# Patient Record
Sex: MALE | Race: WHITE | Employment: OTHER | URBAN - METROPOLITAN AREA
[De-identification: names, ages, dates, MRNs, and addresses within clinical notes are randomized per-mention and may not be internally consistent; named-entity substitution may affect disease eponyms.]

---

## 2017-03-29 ENCOUNTER — TELEPHONE (OUTPATIENT)
Dept: FAMILY MEDICINE CLINIC | Age: 67
End: 2017-03-29

## 2017-03-31 ENCOUNTER — OFFICE VISIT (OUTPATIENT)
Dept: FAMILY MEDICINE CLINIC | Age: 67
End: 2017-03-31

## 2017-03-31 VITALS
HEIGHT: 70 IN | HEART RATE: 68 BPM | SYSTOLIC BLOOD PRESSURE: 124 MMHG | OXYGEN SATURATION: 98 % | DIASTOLIC BLOOD PRESSURE: 80 MMHG | BODY MASS INDEX: 31.24 KG/M2 | RESPIRATION RATE: 16 BRPM | WEIGHT: 218.2 LBS

## 2017-03-31 DIAGNOSIS — M54.2 CERVICAL MUSCLE PAIN: ICD-10-CM

## 2017-03-31 DIAGNOSIS — S16.1XXA ACUTE CERVICAL MYOFASCIAL STRAIN, INITIAL ENCOUNTER: ICD-10-CM

## 2017-03-31 PROCEDURE — G8598 ASA/ANTIPLAT THER USED: HCPCS | Performed by: FAMILY MEDICINE

## 2017-03-31 PROCEDURE — 1123F ACP DISCUSS/DSCN MKR DOCD: CPT | Performed by: FAMILY MEDICINE

## 2017-03-31 PROCEDURE — G8484 FLU IMMUNIZE NO ADMIN: HCPCS | Performed by: FAMILY MEDICINE

## 2017-03-31 PROCEDURE — G8427 DOCREV CUR MEDS BY ELIG CLIN: HCPCS | Performed by: FAMILY MEDICINE

## 2017-03-31 PROCEDURE — G8417 CALC BMI ABV UP PARAM F/U: HCPCS | Performed by: FAMILY MEDICINE

## 2017-03-31 PROCEDURE — 3017F COLORECTAL CA SCREEN DOC REV: CPT | Performed by: FAMILY MEDICINE

## 2017-03-31 PROCEDURE — 99213 OFFICE O/P EST LOW 20 MIN: CPT | Performed by: FAMILY MEDICINE

## 2017-03-31 PROCEDURE — 4040F PNEUMOC VAC/ADMIN/RCVD: CPT | Performed by: FAMILY MEDICINE

## 2017-03-31 PROCEDURE — 1036F TOBACCO NON-USER: CPT | Performed by: FAMILY MEDICINE

## 2017-03-31 RX ORDER — METHYLPREDNISOLONE 4 MG/1
TABLET ORAL
Qty: 1 KIT | Refills: 0 | Status: SHIPPED | OUTPATIENT
Start: 2017-03-31 | End: 2017-04-06

## 2017-03-31 ASSESSMENT — PATIENT HEALTH QUESTIONNAIRE - PHQ9
1. LITTLE INTEREST OR PLEASURE IN DOING THINGS: 0
SUM OF ALL RESPONSES TO PHQ QUESTIONS 1-9: 0
SUM OF ALL RESPONSES TO PHQ9 QUESTIONS 1 & 2: 0
2. FEELING DOWN, DEPRESSED OR HOPELESS: 0

## 2017-03-31 ASSESSMENT — ENCOUNTER SYMPTOMS
EYES NEGATIVE: 1
RESPIRATORY NEGATIVE: 1

## 2017-04-10 ENCOUNTER — OFFICE VISIT (OUTPATIENT)
Dept: FAMILY MEDICINE CLINIC | Age: 67
End: 2017-04-10

## 2017-04-10 VITALS
SYSTOLIC BLOOD PRESSURE: 118 MMHG | DIASTOLIC BLOOD PRESSURE: 66 MMHG | RESPIRATION RATE: 14 BRPM | HEIGHT: 70 IN | BODY MASS INDEX: 31.07 KG/M2 | WEIGHT: 217 LBS | OXYGEN SATURATION: 98 % | HEART RATE: 72 BPM

## 2017-04-10 DIAGNOSIS — M54.2 CERVICAL MUSCLE PAIN: ICD-10-CM

## 2017-04-10 DIAGNOSIS — S16.1XXD ACUTE CERVICAL MYOFASCIAL STRAIN, SUBSEQUENT ENCOUNTER: ICD-10-CM

## 2017-04-10 DIAGNOSIS — M50.30 DDD (DEGENERATIVE DISC DISEASE), CERVICAL: Primary | ICD-10-CM

## 2017-04-10 PROCEDURE — 1036F TOBACCO NON-USER: CPT | Performed by: FAMILY MEDICINE

## 2017-04-10 PROCEDURE — G8417 CALC BMI ABV UP PARAM F/U: HCPCS | Performed by: FAMILY MEDICINE

## 2017-04-10 PROCEDURE — 1123F ACP DISCUSS/DSCN MKR DOCD: CPT | Performed by: FAMILY MEDICINE

## 2017-04-10 PROCEDURE — 3017F COLORECTAL CA SCREEN DOC REV: CPT | Performed by: FAMILY MEDICINE

## 2017-04-10 PROCEDURE — G8427 DOCREV CUR MEDS BY ELIG CLIN: HCPCS | Performed by: FAMILY MEDICINE

## 2017-04-10 PROCEDURE — G8598 ASA/ANTIPLAT THER USED: HCPCS | Performed by: FAMILY MEDICINE

## 2017-04-10 PROCEDURE — 99213 OFFICE O/P EST LOW 20 MIN: CPT | Performed by: FAMILY MEDICINE

## 2017-04-10 PROCEDURE — 4040F PNEUMOC VAC/ADMIN/RCVD: CPT | Performed by: FAMILY MEDICINE

## 2017-04-10 ASSESSMENT — PATIENT HEALTH QUESTIONNAIRE - PHQ9
SUM OF ALL RESPONSES TO PHQ QUESTIONS 1-9: 0
1. LITTLE INTEREST OR PLEASURE IN DOING THINGS: 0
SUM OF ALL RESPONSES TO PHQ9 QUESTIONS 1 & 2: 0
2. FEELING DOWN, DEPRESSED OR HOPELESS: 0

## 2017-04-17 ENCOUNTER — OFFICE VISIT (OUTPATIENT)
Dept: CARDIOLOGY | Age: 67
End: 2017-04-17

## 2017-04-17 VITALS
DIASTOLIC BLOOD PRESSURE: 80 MMHG | HEIGHT: 69 IN | BODY MASS INDEX: 32.29 KG/M2 | SYSTOLIC BLOOD PRESSURE: 148 MMHG | HEART RATE: 64 BPM | WEIGHT: 218 LBS

## 2017-04-17 DIAGNOSIS — E78.01 FAMILIAL HYPERCHOLESTEROLEMIA: ICD-10-CM

## 2017-04-17 DIAGNOSIS — I10 ESSENTIAL HYPERTENSION: ICD-10-CM

## 2017-04-17 DIAGNOSIS — I25.10 CORONARY ARTERY DISEASE INVOLVING NATIVE CORONARY ARTERY OF NATIVE HEART WITHOUT ANGINA PECTORIS: Primary | ICD-10-CM

## 2017-04-17 PROCEDURE — 1036F TOBACCO NON-USER: CPT | Performed by: NUCLEAR MEDICINE

## 2017-04-17 PROCEDURE — G8427 DOCREV CUR MEDS BY ELIG CLIN: HCPCS | Performed by: NUCLEAR MEDICINE

## 2017-04-17 PROCEDURE — 1123F ACP DISCUSS/DSCN MKR DOCD: CPT | Performed by: NUCLEAR MEDICINE

## 2017-04-17 PROCEDURE — 4040F PNEUMOC VAC/ADMIN/RCVD: CPT | Performed by: NUCLEAR MEDICINE

## 2017-04-17 PROCEDURE — G8417 CALC BMI ABV UP PARAM F/U: HCPCS | Performed by: NUCLEAR MEDICINE

## 2017-04-17 PROCEDURE — 93000 ELECTROCARDIOGRAM COMPLETE: CPT | Performed by: NUCLEAR MEDICINE

## 2017-04-17 PROCEDURE — 99213 OFFICE O/P EST LOW 20 MIN: CPT | Performed by: NUCLEAR MEDICINE

## 2017-04-17 PROCEDURE — G8598 ASA/ANTIPLAT THER USED: HCPCS | Performed by: NUCLEAR MEDICINE

## 2017-04-17 PROCEDURE — 3017F COLORECTAL CA SCREEN DOC REV: CPT | Performed by: NUCLEAR MEDICINE

## 2017-04-17 RX ORDER — VALSARTAN AND HYDROCHLOROTHIAZIDE 160; 12.5 MG/1; MG/1
TABLET, FILM COATED ORAL
Qty: 90 TABLET | Refills: 3 | Status: SHIPPED | OUTPATIENT
Start: 2017-04-17 | End: 2018-06-01 | Stop reason: SDUPTHER

## 2017-04-17 RX ORDER — CLOPIDOGREL BISULFATE 75 MG/1
TABLET ORAL
Qty: 90 TABLET | Refills: 3 | Status: SHIPPED | OUTPATIENT
Start: 2017-04-17 | End: 2018-06-01 | Stop reason: SDUPTHER

## 2017-04-17 RX ORDER — ATENOLOL 25 MG/1
TABLET ORAL
Qty: 90 TABLET | Refills: 3 | Status: ON HOLD | OUTPATIENT
Start: 2017-04-17 | End: 2017-12-15 | Stop reason: HOSPADM

## 2017-04-18 ENCOUNTER — PROCEDURE VISIT (OUTPATIENT)
Dept: FAMILY MEDICINE CLINIC | Age: 67
End: 2017-04-18

## 2017-04-18 VITALS
HEIGHT: 69 IN | SYSTOLIC BLOOD PRESSURE: 128 MMHG | OXYGEN SATURATION: 99 % | DIASTOLIC BLOOD PRESSURE: 60 MMHG | WEIGHT: 217.9 LBS | HEART RATE: 65 BPM | BODY MASS INDEX: 32.27 KG/M2 | RESPIRATION RATE: 14 BRPM

## 2017-04-18 DIAGNOSIS — L91.8 SKIN TAG: Primary | ICD-10-CM

## 2017-04-18 PROCEDURE — 1123F ACP DISCUSS/DSCN MKR DOCD: CPT | Performed by: FAMILY MEDICINE

## 2017-04-18 PROCEDURE — 3017F COLORECTAL CA SCREEN DOC REV: CPT | Performed by: FAMILY MEDICINE

## 2017-04-18 PROCEDURE — G8417 CALC BMI ABV UP PARAM F/U: HCPCS | Performed by: FAMILY MEDICINE

## 2017-04-18 PROCEDURE — G8598 ASA/ANTIPLAT THER USED: HCPCS | Performed by: FAMILY MEDICINE

## 2017-04-18 PROCEDURE — G8427 DOCREV CUR MEDS BY ELIG CLIN: HCPCS | Performed by: FAMILY MEDICINE

## 2017-04-18 PROCEDURE — 11200 RMVL SKIN TAGS UP TO&INC 15: CPT | Performed by: FAMILY MEDICINE

## 2017-04-18 PROCEDURE — 1036F TOBACCO NON-USER: CPT | Performed by: FAMILY MEDICINE

## 2017-04-18 PROCEDURE — 4040F PNEUMOC VAC/ADMIN/RCVD: CPT | Performed by: FAMILY MEDICINE

## 2017-06-16 ENCOUNTER — TELEPHONE (OUTPATIENT)
Dept: FAMILY MEDICINE CLINIC | Age: 67
End: 2017-06-16

## 2017-06-19 ENCOUNTER — CARE COORDINATION (OUTPATIENT)
Dept: FAMILY MEDICINE CLINIC | Age: 67
End: 2017-06-19

## 2017-11-08 RX ORDER — TAMSULOSIN HYDROCHLORIDE 0.4 MG/1
0.4 CAPSULE ORAL DAILY
Qty: 30 CAPSULE | Refills: 11 | Status: SHIPPED | OUTPATIENT
Start: 2017-11-08 | End: 2019-02-04

## 2017-11-17 ENCOUNTER — NURSE TRIAGE (OUTPATIENT)
Dept: ADMINISTRATIVE | Age: 67
End: 2017-11-17

## 2017-11-17 NOTE — TELEPHONE ENCOUNTER
Reason for Disposition   Caused by overuse from recent vigorous activity (e.g.,  aerobics, jogging/running, physical work, prolonged walking, sports)  (all triage questions negative)    Answer Assessment - Initial Assessment Questions  1. ONSET: \"When did the pain start? \"       This morning   2. LOCATION: \"Where is the pain located? \"       Left leg   3. PAIN: \"How bad is the pain? \"    (Scale 1-10; or mild, moderate, severe)    -  MILD (1-3): doesn't interfere with normal activities     -  MODERATE (4-7): interferes with normal activities (e.g., work or school) or awakens from sleep, limping     -  SEVERE (8-10): excruciating pain, unable to do any normal activities, unable to walk      3  4. WORK OR EXERCISE: \"Has there been any recent work or exercise that involved this part of the body? \"       On feet more this week   5. CAUSE: \"What do you think is causing the leg pain? \"      *No Answer*  6. OTHER SYMPTOMS: \"Do you have any other symptoms? \" (e.g., chest pain, back pain, breathing difficulty, swelling, rash, fever, numbness, weakness)  No rashes  No redness  No swelling *  7. PREGNANCY: \"Is there any chance you are pregnant? \" \"When was your last menstrual period? \"     n/a    Protocols used: LEG PAIN-ADULT-

## 2017-12-01 RX ORDER — NITROGLYCERIN 0.4 MG/1
TABLET SUBLINGUAL
Qty: 25 TABLET | Refills: 3 | Status: SHIPPED | OUTPATIENT
Start: 2017-12-01 | End: 2019-09-11 | Stop reason: SDUPTHER

## 2017-12-01 RX ORDER — ISOSORBIDE MONONITRATE 30 MG/1
30 TABLET, EXTENDED RELEASE ORAL DAILY PRN
Qty: 30 TABLET | Refills: 5 | Status: SHIPPED | OUTPATIENT
Start: 2017-12-01 | End: 2018-02-08 | Stop reason: ALTCHOICE

## 2017-12-01 NOTE — TELEPHONE ENCOUNTER
Lori Klein called requesting a refill on the following medications:  Requested Prescriptions     Pending Prescriptions Disp Refills    isosorbide mononitrate (IMDUR) 30 MG extended release tablet 30 tablet 11     Sig: Take 1 tablet by mouth daily as needed    nitroGLYCERIN (NITROSTAT) 0.4 MG SL tablet 25 tablet 3     Sig: up to max of 3 total doses. If no relief after 1 dose, call 911.      Pharmacy verified:  .pv    [facility]    Date of last visit: 4/17/17  Date of next visit (if applicable): 2/89/3206

## 2017-12-04 ENCOUNTER — TELEPHONE (OUTPATIENT)
Dept: FAMILY MEDICINE CLINIC | Age: 67
End: 2017-12-04

## 2017-12-04 NOTE — TELEPHONE ENCOUNTER
Pt called office stating Dr. Krish Blue is out of town this week. Pt has been having \"quite a bit\" of chest pain the past 3-4days. Pt is asking to come in for an EKG. I encouraged pt to go to the ER for eval, as we are limited on testing here in the office for chest pain. Pt says \"I'm not really up for that, I've been down that road before\". I spoke personally to Dr. Sasha Alvarez who says no, pt absolutely needs to go to the ER for eval. I again encouraged pt to go to the ER per Dr. Ericka Braxton advice and pt hung up the phone before I could confirm pt is actually going to go to the ER. I called pt back and he is just not sure if that is what he wants to do, he says the pain is not that bad. I repeated back to pt that he informed me it was \"quite a bit\" of chest pain. He says it was a few days ago, just not as bad now.  Pt is going to think things over and he may go to the ER for eval.

## 2017-12-11 ENCOUNTER — OFFICE VISIT (OUTPATIENT)
Dept: CARDIOLOGY CLINIC | Age: 67
End: 2017-12-11
Payer: MEDICARE

## 2017-12-11 VITALS
WEIGHT: 206 LBS | BODY MASS INDEX: 30.51 KG/M2 | DIASTOLIC BLOOD PRESSURE: 90 MMHG | HEART RATE: 59 BPM | SYSTOLIC BLOOD PRESSURE: 162 MMHG | HEIGHT: 69 IN

## 2017-12-11 DIAGNOSIS — R07.2 PRECORDIAL PAIN: ICD-10-CM

## 2017-12-11 DIAGNOSIS — I25.10 CORONARY ARTERY DISEASE INVOLVING NATIVE CORONARY ARTERY OF NATIVE HEART WITHOUT ANGINA PECTORIS: Primary | ICD-10-CM

## 2017-12-11 DIAGNOSIS — I10 ESSENTIAL HYPERTENSION: ICD-10-CM

## 2017-12-11 DIAGNOSIS — E78.01 FAMILIAL HYPERCHOLESTEROLEMIA: ICD-10-CM

## 2017-12-11 PROCEDURE — 99214 OFFICE O/P EST MOD 30 MIN: CPT | Performed by: NUCLEAR MEDICINE

## 2017-12-11 PROCEDURE — 93000 ELECTROCARDIOGRAM COMPLETE: CPT | Performed by: NUCLEAR MEDICINE

## 2017-12-11 PROCEDURE — G8484 FLU IMMUNIZE NO ADMIN: HCPCS | Performed by: NUCLEAR MEDICINE

## 2017-12-11 PROCEDURE — G8417 CALC BMI ABV UP PARAM F/U: HCPCS | Performed by: NUCLEAR MEDICINE

## 2017-12-11 PROCEDURE — 4040F PNEUMOC VAC/ADMIN/RCVD: CPT | Performed by: NUCLEAR MEDICINE

## 2017-12-11 PROCEDURE — G8427 DOCREV CUR MEDS BY ELIG CLIN: HCPCS | Performed by: NUCLEAR MEDICINE

## 2017-12-11 PROCEDURE — 3017F COLORECTAL CA SCREEN DOC REV: CPT | Performed by: NUCLEAR MEDICINE

## 2017-12-11 PROCEDURE — G8598 ASA/ANTIPLAT THER USED: HCPCS | Performed by: NUCLEAR MEDICINE

## 2017-12-11 PROCEDURE — 1036F TOBACCO NON-USER: CPT | Performed by: NUCLEAR MEDICINE

## 2017-12-11 PROCEDURE — 1123F ACP DISCUSS/DSCN MKR DOCD: CPT | Performed by: NUCLEAR MEDICINE

## 2017-12-11 NOTE — PROGRESS NOTES
Quit date: 5/26/1992    Smokeless tobacco: Not on file    Alcohol use Yes      Comment: glass red wine daily      Current Outpatient Prescriptions   Medication Sig Dispense Refill    isosorbide mononitrate (IMDUR) 30 MG extended release tablet Take 1 tablet by mouth daily as needed (PRN) 30 tablet 5    nitroGLYCERIN (NITROSTAT) 0.4 MG SL tablet DISSOLVE ONE TABLET UNDER THE TONGUE EVERY 5 MINUTES AS NEEDED FOR CHEST PAIN. DO NOT EXCEED A TOTAL OF 3 DOSES IN 15 MINUTES 25 tablet 3    tamsulosin (FLOMAX) 0.4 MG capsule Take 1 capsule by mouth daily (Patient taking differently: Take 0.4 mg by mouth as needed ) 30 capsule 11    clopidogrel (PLAVIX) 75 MG tablet TAKE ONE TABLET BY MOUTH ONCE DAILY 90 tablet 3    valsartan-hydrochlorothiazide (DIOVAN-HCT) 160-12.5 MG per tablet TAKE ONE TABLET BY MOUTH ONCE DAILY 90 tablet 3    atenolol (TENORMIN) 25 MG tablet TAKE ONE TABLET BY MOUTH ONCE DAILY 90 tablet 3    Cholecalciferol (VITAMIN D PO) Take by mouth      aspirin 81 MG tablet Take 81 mg by mouth daily.  omeprazole (PRILOSEC) 20 MG capsule Take 20 mg by mouth daily.  Ascorbic Acid (VITAMIN C) 1000 MG tablet Take 1,000 mg by mouth daily.  Coenzyme Q10 100 MG CAPS Take 100 mg by mouth daily. No current facility-administered medications for this visit.       Allergies   Allergen Reactions    Crestor [Rosuvastatin]      Muscle aches    Tape [Adhesive Tape] Rash     Blisters and red     Health Maintenance   Topic Date Due    AAA screen  1950    Hepatitis C screen  1950    DTaP/Tdap/Td vaccine (1 - Tdap) 03/28/1970    Diabetes screen  03/28/1991    Colon cancer screen colonoscopy  03/28/2001    Zostavax vaccine  03/28/2011    Pneumococcal low/med risk (1 of 2 - PCV13) 03/28/2016    Flu vaccine (1) 09/01/2017    Lipid screen  10/11/2018       Subjective:  Review of Systems  General:   No fever, no chills, some fatigue or weight loss  Pulmonary:    some dyspnea, no wheezing  Cardiac:    Did have chest pain,   GI:     No nausea or vomiting, no abdominal pain  Neuro:     No dizziness or light headedness,   Musculoskeletal:  No recent active issues  Extremities:   No edema, good peripheral pulses      Objective:  Physical Exam  BP (!) 162/90   Pulse 59   Ht 5' 9\" (1.753 m)   Wt 206 lb (93.4 kg)   BMI 30.42 kg/m²   General:   Well developed, well nourished  Lungs:    Clear to auscultation  Heart:    Normal S1 S2, Slight murmur. no rubs, no gallops  Abdomen:   Soft, non tender, no organomegalies, positive bowel sounds  Extremities:   No edema, no cyanosis, good peripheral pulses  Neurological:   Awake, alert, oriented. No obvious focal deficits  Musculoskelatal:  No obvious deformities    Assessment:     1. Coronary artery disease involving native coronary artery of native heart without angina pectoris  EKG 12 Lead   2. Precordial pain  EKG 12 Lead   3. Familial hypercholesterolemia     4. Essential hypertension     concerning symptoms  Concerning higher risk patient   ECG in office was done today. I reviewed the ECG. No acute findings    Plan:  No Follow-up on file. We discussed a cath vs a stress test   Higher risk patient   concerning symptoms  Use Imdur daily   Cardiac cathterizaion, risks and benefits discussed with the patient and family at length. Patient was agreeable. Continue risk factor modification and medical management  Thank you for allowing me to participate in the care of your patient. Please don't hesitate to contact me regarding any further issues related to the patient care    Orders Placed:  Orders Placed This Encounter   Procedures    EKG 12 Lead     Order Specific Question:   Reason for Exam?     Answer:   Chest pain       Medications Prescribed:  No orders of the defined types were placed in this encounter. Discussed use, benefit, and side effects of prescribed medications. All patient questions answered. Pt voiced understanding.  Instructed

## 2017-12-11 NOTE — PROGRESS NOTES
Patient here for check up for chest pain. Patient complains of chest pain for the last couple weeks, has taken NTG with effect. Patient complains of worsening symptoms in the cold. Patient complains of increasing fatigue and sob.

## 2017-12-14 ENCOUNTER — HOSPITAL ENCOUNTER (OUTPATIENT)
Age: 67
Discharge: HOME | End: 2017-12-14
Attending: NUCLEAR MEDICINE | Admitting: NUCLEAR MEDICINE
Payer: MEDICARE

## 2017-12-14 ENCOUNTER — HOSPITAL ENCOUNTER (OUTPATIENT)
Dept: CARDIAC CATH/INVASIVE PROCEDURES | Age: 67
Discharge: HOME | End: 2017-12-15
Attending: NUCLEAR MEDICINE | Admitting: NUCLEAR MEDICINE
Payer: MEDICARE

## 2017-12-14 PROBLEM — R07.9 CHEST PAIN: Status: ACTIVE | Noted: 2017-12-14

## 2017-12-14 LAB
ABO: NORMAL
ANION GAP SERPL CALCULATED.3IONS-SCNC: 14 MEQ/L (ref 8–16)
ANTIBODY SCREEN: NORMAL
BASOPHILS # BLD: 1 %
BASOPHILS ABSOLUTE: 0.1 THOU/MM3 (ref 0–0.1)
BUN BLDV-MCNC: 22 MG/DL (ref 7–22)
CALCIUM SERPL-MCNC: 9.6 MG/DL (ref 8.5–10.5)
CHLORIDE BLD-SCNC: 99 MEQ/L (ref 98–111)
CHOLESTEROL, TOTAL: 293 MG/DL (ref 100–199)
CO2: 27 MEQ/L (ref 23–33)
CREAT SERPL-MCNC: 1 MG/DL (ref 0.4–1.2)
EOSINOPHIL # BLD: 2.4 %
EOSINOPHILS ABSOLUTE: 0.2 THOU/MM3 (ref 0–0.4)
GFR SERPL CREATININE-BSD FRML MDRD: 75 ML/MIN/1.73M2
GLUCOSE BLD-MCNC: 102 MG/DL (ref 70–108)
HCT VFR BLD CALC: 47.6 % (ref 42–52)
HDLC SERPL-MCNC: 32 MG/DL
HEMOGLOBIN: 16.7 GM/DL (ref 14–18)
INR BLD: 1 (ref 0.85–1.13)
LDL CHOLESTEROL CALCULATED: ABNORMAL MG/DL
LV EF: 55 %
LVEF MODALITY: NORMAL
LYMPHOCYTES # BLD: 30.6 %
LYMPHOCYTES ABSOLUTE: 2.6 THOU/MM3 (ref 1–4.8)
MCH RBC QN AUTO: 30.8 PG (ref 27–31)
MCHC RBC AUTO-ENTMCNC: 35 GM/DL (ref 33–37)
MCV RBC AUTO: 88.1 FL (ref 80–94)
MONOCYTES # BLD: 7.1 %
MONOCYTES ABSOLUTE: 0.6 THOU/MM3 (ref 0.4–1.3)
NUCLEATED RED BLOOD CELLS: 0 /100 WBC
PDW BLD-RTO: 13.3 % (ref 11.5–14.5)
PLATELET # BLD: 151 THOU/MM3 (ref 130–400)
PMV BLD AUTO: 9.8 MCM (ref 7.4–10.4)
POTASSIUM SERPL-SCNC: 4.4 MEQ/L (ref 3.5–5.2)
RBC # BLD: 5.41 MILL/MM3 (ref 4.7–6.1)
RH FACTOR: NORMAL
SEG NEUTROPHILS: 58.9 %
SEGMENTED NEUTROPHILS ABSOLUTE COUNT: 4.9 THOU/MM3 (ref 1.8–7.7)
SODIUM BLD-SCNC: 140 MEQ/L (ref 135–145)
TRIGL SERPL-MCNC: 491 MG/DL (ref 0–199)
WBC # BLD: 8.4 THOU/MM3 (ref 4.8–10.8)

## 2017-12-14 PROCEDURE — 2580000003 HC RX 258: Performed by: NUCLEAR MEDICINE

## 2017-12-14 PROCEDURE — C1725 CATH, TRANSLUMIN NON-LASER: HCPCS

## 2017-12-14 PROCEDURE — 86900 BLOOD TYPING SEROLOGIC ABO: CPT

## 2017-12-14 PROCEDURE — 36415 COLL VENOUS BLD VENIPUNCTURE: CPT

## 2017-12-14 PROCEDURE — 80061 LIPID PANEL: CPT

## 2017-12-14 PROCEDURE — 6360000002 HC RX W HCPCS

## 2017-12-14 PROCEDURE — C1894 INTRO/SHEATH, NON-LASER: HCPCS

## 2017-12-14 PROCEDURE — 2720000010 HC SURG SUPPLY STERILE

## 2017-12-14 PROCEDURE — 85610 PROTHROMBIN TIME: CPT

## 2017-12-14 PROCEDURE — 85025 COMPLETE CBC W/AUTO DIFF WBC: CPT

## 2017-12-14 PROCEDURE — C1769 GUIDE WIRE: HCPCS

## 2017-12-14 PROCEDURE — 86850 RBC ANTIBODY SCREEN: CPT

## 2017-12-14 PROCEDURE — 93306 TTE W/DOPPLER COMPLETE: CPT

## 2017-12-14 PROCEDURE — 92920 PRQ TRLUML C ANGIOP 1ART&/BR: CPT | Performed by: NUCLEAR MEDICINE

## 2017-12-14 PROCEDURE — 2580000003 HC RX 258

## 2017-12-14 PROCEDURE — 80048 BASIC METABOLIC PNL TOTAL CA: CPT

## 2017-12-14 PROCEDURE — 2780000010 HC IMPLANT OTHER

## 2017-12-14 PROCEDURE — 93458 L HRT ARTERY/VENTRICLE ANGIO: CPT | Performed by: NUCLEAR MEDICINE

## 2017-12-14 PROCEDURE — C1760 CLOSURE DEV, VASC: HCPCS

## 2017-12-14 PROCEDURE — 93005 ELECTROCARDIOGRAM TRACING: CPT

## 2017-12-14 PROCEDURE — 6370000000 HC RX 637 (ALT 250 FOR IP): Performed by: INTERNAL MEDICINE

## 2017-12-14 PROCEDURE — 2500000003 HC RX 250 WO HCPCS

## 2017-12-14 PROCEDURE — A6258 TRANSPARENT FILM >16<=48 IN: HCPCS

## 2017-12-14 PROCEDURE — 86901 BLOOD TYPING SEROLOGIC RH(D): CPT

## 2017-12-14 RX ORDER — ACETAMINOPHEN 325 MG/1
650 TABLET ORAL EVERY 4 HOURS PRN
Status: DISCONTINUED | OUTPATIENT
Start: 2017-12-14 | End: 2017-12-15 | Stop reason: HOSPADM

## 2017-12-14 RX ORDER — ATROPINE SULFATE 0.4 MG/ML
0.5 AMPUL (ML) INJECTION
Status: ACTIVE | OUTPATIENT
Start: 2017-12-14 | End: 2017-12-14

## 2017-12-14 RX ORDER — ONDANSETRON 2 MG/ML
4 INJECTION INTRAMUSCULAR; INTRAVENOUS EVERY 6 HOURS PRN
Status: DISCONTINUED | OUTPATIENT
Start: 2017-12-14 | End: 2017-12-15 | Stop reason: HOSPADM

## 2017-12-14 RX ORDER — MORPHINE SULFATE 2 MG/ML
2 INJECTION, SOLUTION INTRAMUSCULAR; INTRAVENOUS
Status: ACTIVE | OUTPATIENT
Start: 2017-12-14 | End: 2017-12-14

## 2017-12-14 RX ORDER — ATROPINE SULFATE 0.4 MG/ML
0.5 AMPUL (ML) INJECTION
Status: DISCONTINUED | OUTPATIENT
Start: 2017-12-14 | End: 2017-12-14 | Stop reason: SDUPTHER

## 2017-12-14 RX ORDER — CARVEDILOL 3.12 MG/1
3.12 TABLET ORAL 2 TIMES DAILY WITH MEALS
Status: DISCONTINUED | OUTPATIENT
Start: 2017-12-14 | End: 2017-12-15 | Stop reason: HOSPADM

## 2017-12-14 RX ORDER — ASPIRIN 81 MG/1
81 TABLET, CHEWABLE ORAL ONCE
Status: DISCONTINUED | OUTPATIENT
Start: 2017-12-14 | End: 2017-12-14

## 2017-12-14 RX ORDER — ONDANSETRON 2 MG/ML
4 INJECTION INTRAMUSCULAR; INTRAVENOUS EVERY 6 HOURS PRN
Status: DISCONTINUED | OUTPATIENT
Start: 2017-12-14 | End: 2017-12-14 | Stop reason: SDUPTHER

## 2017-12-14 RX ORDER — SODIUM CHLORIDE 9 MG/ML
75 INJECTION, SOLUTION INTRAVENOUS CONTINUOUS
Status: DISCONTINUED | OUTPATIENT
Start: 2017-12-14 | End: 2017-12-15 | Stop reason: HOSPADM

## 2017-12-14 RX ORDER — SODIUM CHLORIDE 0.9 % (FLUSH) 0.9 %
10 SYRINGE (ML) INJECTION EVERY 12 HOURS SCHEDULED
Status: DISCONTINUED | OUTPATIENT
Start: 2017-12-14 | End: 2017-12-14 | Stop reason: SDUPTHER

## 2017-12-14 RX ORDER — ASPIRIN 81 MG/1
81 TABLET ORAL DAILY
Status: DISCONTINUED | OUTPATIENT
Start: 2017-12-15 | End: 2017-12-15 | Stop reason: HOSPADM

## 2017-12-14 RX ORDER — ACETAMINOPHEN 325 MG/1
650 TABLET ORAL EVERY 4 HOURS PRN
Status: DISCONTINUED | OUTPATIENT
Start: 2017-12-14 | End: 2017-12-14 | Stop reason: SDUPTHER

## 2017-12-14 RX ORDER — SODIUM CHLORIDE 0.9 % (FLUSH) 0.9 %
10 SYRINGE (ML) INJECTION PRN
Status: DISCONTINUED | OUTPATIENT
Start: 2017-12-14 | End: 2017-12-15 | Stop reason: HOSPADM

## 2017-12-14 RX ORDER — SODIUM CHLORIDE 9 MG/ML
INJECTION, SOLUTION INTRAVENOUS CONTINUOUS
Status: DISCONTINUED | OUTPATIENT
Start: 2017-12-14 | End: 2017-12-14

## 2017-12-14 RX ORDER — SODIUM CHLORIDE 0.9 % (FLUSH) 0.9 %
10 SYRINGE (ML) INJECTION EVERY 12 HOURS SCHEDULED
Status: DISCONTINUED | OUTPATIENT
Start: 2017-12-14 | End: 2017-12-15 | Stop reason: HOSPADM

## 2017-12-14 RX ORDER — SODIUM CHLORIDE 0.9 % (FLUSH) 0.9 %
10 SYRINGE (ML) INJECTION PRN
Status: DISCONTINUED | OUTPATIENT
Start: 2017-12-14 | End: 2017-12-14

## 2017-12-14 RX ORDER — SODIUM CHLORIDE 9 MG/ML
75 INJECTION, SOLUTION INTRAVENOUS CONTINUOUS
Status: DISCONTINUED | OUTPATIENT
Start: 2017-12-14 | End: 2017-12-14 | Stop reason: SDUPTHER

## 2017-12-14 RX ORDER — SODIUM CHLORIDE 0.9 % (FLUSH) 0.9 %
10 SYRINGE (ML) INJECTION EVERY 12 HOURS SCHEDULED
Status: DISCONTINUED | OUTPATIENT
Start: 2017-12-14 | End: 2017-12-14

## 2017-12-14 RX ORDER — SODIUM CHLORIDE 0.9 % (FLUSH) 0.9 %
10 SYRINGE (ML) INJECTION PRN
Status: DISCONTINUED | OUTPATIENT
Start: 2017-12-14 | End: 2017-12-14 | Stop reason: SDUPTHER

## 2017-12-14 RX ORDER — MORPHINE SULFATE 2 MG/ML
2 INJECTION, SOLUTION INTRAMUSCULAR; INTRAVENOUS
Status: DISCONTINUED | OUTPATIENT
Start: 2017-12-14 | End: 2017-12-14 | Stop reason: SDUPTHER

## 2017-12-14 RX ADMIN — SODIUM CHLORIDE: 9 INJECTION, SOLUTION INTRAVENOUS at 15:51

## 2017-12-14 RX ADMIN — CARVEDILOL 3.12 MG: 3.12 TABLET, FILM COATED ORAL at 22:47

## 2017-12-14 RX ADMIN — ACETAMINOPHEN 650 MG: 325 TABLET ORAL at 21:03

## 2017-12-14 ASSESSMENT — PAIN DESCRIPTION - ORIENTATION: ORIENTATION: LOWER

## 2017-12-14 ASSESSMENT — PAIN SCALES - GENERAL
PAINLEVEL_OUTOF10: 0
PAINLEVEL_OUTOF10: 2
PAINLEVEL_OUTOF10: 0
PAINLEVEL_OUTOF10: 2

## 2017-12-14 ASSESSMENT — PAIN DESCRIPTION - FREQUENCY: FREQUENCY: CONTINUOUS

## 2017-12-14 ASSESSMENT — PAIN DESCRIPTION - DESCRIPTORS: DESCRIPTORS: ACHING

## 2017-12-14 ASSESSMENT — PAIN DESCRIPTION - ONSET: ONSET: ON-GOING

## 2017-12-14 ASSESSMENT — PAIN DESCRIPTION - PAIN TYPE: TYPE: ACUTE PAIN

## 2017-12-14 ASSESSMENT — PAIN DESCRIPTION - LOCATION: LOCATION: BACK

## 2017-12-14 NOTE — CONSULTS
rubs    Lungs: Clear to auscultation    Abdomen: BS present, without HSM, masses, or tenderness    Extremities: without C,C,E.  Pulses 2+ bilaterally  Mental Status: Alert & Oriented        PLANNED PROCEDURE   [x]Cath  []PCI                []Pacemaker/AICD  []VENKATESH             []Cardioversion []Peripheral angiography/PTA  []Other:      SEDATION  Planned agent:[x]Midazolam []Meperidine [x]Sublimaze []Morphine  []Diazepam  []Other:     ASA Classification:  []1 [x]2 []3 []4 []5  Class 1: A normal healthy patient  Class 2: Pt with mild to moderate systemic disease  Class 3: Severe systemic disease or disturbance  Class 4: Severe systemic disorders that are already life threatening. Class 5: Moribund pt with little chances of survival, for more than 24 hours. Mallampati I Airway Classification:   []1 [x]2 []3 []4    [x]Pre-procedure diagnostic studies complete and results available. Comment:    [x]Previous sedation/anesthesia experiences assessed. Comment:    [x]The patient is an appropriate candidate to undergo the planned procedure sedation and anesthesia. (Refer to nursing sedation/analgesia documentation record)  [x]Formulation and discussion of sedation/procedure plan, risks, and expectations with patient and/or responsible adult completed. [x]Patient examined immediately prior to the procedure.  (Refer to nursing sedation/analgesia documentation record)    Vivian Maravilla MD [facility]  Electronically signed 12/14/2017 at 2:41 PM

## 2017-12-15 ENCOUNTER — TELEPHONE (OUTPATIENT)
Dept: FAMILY MEDICINE CLINIC | Age: 67
End: 2017-12-15

## 2017-12-15 VITALS
DIASTOLIC BLOOD PRESSURE: 71 MMHG | TEMPERATURE: 97.4 F | OXYGEN SATURATION: 95 % | HEART RATE: 64 BPM | SYSTOLIC BLOOD PRESSURE: 136 MMHG | BODY MASS INDEX: 30.27 KG/M2 | HEIGHT: 69 IN | RESPIRATION RATE: 14 BRPM | WEIGHT: 204.4 LBS

## 2017-12-15 LAB
EKG ATRIAL RATE: 66 BPM
EKG P AXIS: 61 DEGREES
EKG P-R INTERVAL: 168 MS
EKG Q-T INTERVAL: 416 MS
EKG QRS DURATION: 84 MS
EKG QTC CALCULATION (BAZETT): 436 MS
EKG R AXIS: 10 DEGREES
EKG T AXIS: 27 DEGREES
EKG VENTRICULAR RATE: 66 BPM

## 2017-12-15 PROCEDURE — 99213 OFFICE O/P EST LOW 20 MIN: CPT | Performed by: PHYSICIAN ASSISTANT

## 2017-12-15 PROCEDURE — APPSS30 APP SPLIT SHARED TIME 16-30 MINUTES: Performed by: PHYSICIAN ASSISTANT

## 2017-12-15 PROCEDURE — 2580000003 HC RX 258: Performed by: INTERNAL MEDICINE

## 2017-12-15 RX ORDER — ATORVASTATIN CALCIUM 80 MG/1
80 TABLET, FILM COATED ORAL DAILY
Qty: 30 TABLET | Refills: 3 | Status: SHIPPED | OUTPATIENT
Start: 2017-12-15 | End: 2018-02-08 | Stop reason: ALTCHOICE

## 2017-12-15 RX ORDER — CARVEDILOL 3.12 MG/1
3.12 TABLET ORAL 2 TIMES DAILY WITH MEALS
Qty: 60 TABLET | Refills: 3 | Status: SHIPPED | OUTPATIENT
Start: 2017-12-15 | End: 2018-04-02

## 2017-12-15 RX ORDER — ASPIRIN 81 MG/1
81 TABLET ORAL DAILY
Qty: 30 TABLET | Refills: 3 | Status: ON HOLD | OUTPATIENT
Start: 2017-12-16 | End: 2019-10-10 | Stop reason: HOSPADM

## 2017-12-15 RX ORDER — PANTOPRAZOLE SODIUM 20 MG/1
20 TABLET, DELAYED RELEASE ORAL DAILY
Qty: 30 TABLET | Refills: 3 | Status: SHIPPED | OUTPATIENT
Start: 2017-12-15 | End: 2018-02-08 | Stop reason: ALTCHOICE

## 2017-12-15 RX ADMIN — ASPIRIN 81 MG: 81 TABLET ORAL at 10:01

## 2017-12-15 RX ADMIN — Medication 10 ML: at 10:02

## 2017-12-15 ASSESSMENT — PAIN SCALES - GENERAL: PAINLEVEL_OUTOF10: 0

## 2017-12-15 NOTE — PROGRESS NOTES
Nutrition Assessment    Type and Reason for Visit: Initial, Consult, Patient Education    Nutrition Recommendations: Encourage Heart Healthy Diet. Malnutrition Assessment:  · Malnutrition Status: No malnutrition    Nutrition Diagnosis:   · Problem: Pt with increased fat and sodium diet intake  · Etiology:  due to noncompliance of Heart Healthy Diet     Signs and symptoms:  as evidenced by Patient report of, medica findings    Nutrition Assessment:  · Subjective Assessment: Pt is s/p cath yesterday. Received consult for heart healthy diet education. Pt seen. Sitting up in bed. Very pleasant and talkative. Stated that he has a family hx of CAD. His hx of CAD started \"years ago\". Has dealt with high blood pressure and abnormal lipids for years. His wife is a nurse and his one daughter is a pharmacist at [employer], so he has good family support. Talked about heart healthy diet. Pt has definitely heard it before - just needs to comply with it. Did state that him and his wife just joined a gym so he is hoping to increase his physical activity as well. Provided pt with verbal and written diet education. Pt has good understanding, just needs to adhere to diet and exercise recommendations.    · Wound Type:  (s/p cath on 12/15)  · Current Nutrition Therapies:  · Oral Diet Orders: Cardiac   · Oral Diet intake: %  · Oral Nutrition Supplement (ONS) Orders: None  · Anthropometric Measures:  · Ht: 5' 9\" (175.3 cm)   · Current Body Wt: 204 lb 6.4 oz (92.7 kg)  · Admission Body Wt: 204 lb 6.4 oz (92.7 kg)  · Usual Body Wt:  (Around 200# per pt)  · Ideal Body Wt: 160 lb (72.6 kg), % Ideal Body 128%  · Adjusted Body Wt: 171 lb (77.6 kg), body weight adjusted for Obesity  · BMI Classification: BMI 30.0 - 34.9 Obese Class I  · Comparative Standards (Estimated Nutrition Needs):  · Estimated Daily Total Kcal: (25-28 kcal/kgm of adjusted wt) 1950 - 2184 kcal/day  · Estimated Daily Protein (g): 102 - 117 grams/day    Estimated Intake vs Estimated Needs: Intake Meets Needs    Nutrition Risk Level: Low    Nutrition Interventions:   Continue current diet, ONS not indicated  Continued Inpatient Monitoring, Education Completed    Nutrition Evaluation:   · Evaluation: Goals set   · Goals: Pt will adhere to a Heart Healthy Diet and eat at least 75% of his meals during his LOS    · Monitoring: Meal Intake, Weight, Comparative Standards, Pertinent Labs, Patient/Family Education    See Adult Nutrition Doc Flowsheet for more detail.      Electronically signed by Marcell Ramos RD, LD on 12/15/17 at 10:41 AM    Contact Number: [phone]

## 2017-12-15 NOTE — PROGRESS NOTES
Inpatient Cardiac Rehabilitation Consult    Received consult for Phase II Cardiac Rehabilitation. Cardiac Rehab education completed with patient. Pt is unsure about rehab and would like us to call him at home in a few weeks. Brochure given.

## 2017-12-15 NOTE — PROGRESS NOTES
Discharge teaching and instructions for diagnosis/procedure of heart cath completed with patient using teachback method. AVS reviewed. Patient voiced understanding regarding prescriptions, follow up appointments, and care of self at home. Discharged in a wheelchair to  independent living per family.

## 2017-12-15 NOTE — PLAN OF CARE
Problem: DISCHARGE BARRIERS  Goal: Patient's continuum of care needs are met  Outcome: Met This Shift  Patient being discharged home with wife today. Problem: Cardiac Output - Decreased:  Goal: Hemodynamic stability will improve  Hemodynamic stability will improve   Outcome: Met This Shift  Patient received pamphlet about cardiac intervention, how to take care of the incision site, mended hearts program, cardiac rehab and the hours of operations, and Nutritional information regarding cardiac diet. Comments: Care plan reviewed with patient. Patient verbalize understanding of the plan of care and contribute to goal setting.

## 2017-12-15 NOTE — DISCHARGE SUMMARY
Glucose 102 70 - 108 mg/dL    BUN 22 7 - 22 mg/dL    CREATININE 1.0 0.4 - 1.2 mg/dL    Calcium 9.6 8.5 - 10.5 mg/dL   TYPE AND SCREEN    Collection Time: 12/14/17  3:28 PM   Result Value Ref Range    ABO O     Rh Factor POS     Antibody Screen NEG    Lipid panel    Collection Time: 12/14/17  3:28 PM   Result Value Ref Range    Cholesterol, Total 293 (H) 100 - 199 mg/dL    Triglycerides 491 (H) 0 - 199 mg/dL    HDL 32 mg/dL    LDL Calculated SEE BELOW mg/dL   Anion Gap    Collection Time: 12/14/17  3:28 PM   Result Value Ref Range    Anion Gap 14.0 8.0 - 16.0 meq/L   Glomerular Filtration Rate, Estimated    Collection Time: 12/14/17  3:28 PM   Result Value Ref Range    Est, Glom Filt Rate 75 (A) ml/min/1.73m2   EKG 12 lead    Collection Time: 12/14/17  6:31 PM   Result Value Ref Range    Ventricular Rate 66 BPM    Atrial Rate 66 BPM    P-R Interval 168 ms    QRS Duration 84 ms    Q-T Interval 416 ms    QTc Calculation (Bazett) 436 ms    P Axis 61 degrees    R Axis 10 degrees    T Axis 27 degrees       Patient Instructions:    Discharge lab work: none  Activity: activity as tolerated  Diet: DIET CARDIAC;      Cardiac Rehab: Yes    Follow-up visits: Follow-up with Dr. Akilah Giordano as scheduled    Discharge condition: good  Disposition: Home  Time spent on discharge: 20 minutes      Discharge Medications for PCI/MI (performed or attempted):   · ASA: yes  · Statin: yes  · P2Y12 Inhibitor: yes  · Beta Blocker: yes  · Nitro SL: yes        If patient experiences reoccurrences of symptoms such as chest pain, shortness of breath, lower extremity edema, >3 lb weight gain, syncope, near syncope, or any other concerns, seek medical advise ASAP or return to ER for evaluation.     Electronically signed by Casi Thurman PA-C on 12/15/2017 at 10:51 AM

## 2017-12-15 NOTE — PROCEDURES
[address] [place] [address]                              CARDIAC CATHETERIZATION    PATIENT NAME: Kingston Galeazzi                  :        1950  MED REC NO:   [id number]                           ROOM:       0021  ACCOUNT NO:   [de-identified]                           ADMIT DATE: 2017  PROVIDER:     Tramaine Solis MD    DATE OF PROCEDURE:  2017    PROCEDURE PERFORMED:  Revascularization of in-stent stenosis in mid LAD. FINDINGS:  1. From the diagnostic study performed by Dr. Sonja Phipps, a 95% in-stent  stenosis in mid LAD was identified. 2.  This lesion was revascularized with a 3.0 x 15 mm cutting balloon with  a result of 0% residual SHANNAN grade 3 flow distally. COMPLICATIONS:  None. EQUIPMENT USED FOR THIS PROCEDURE:  A 6-Haitian 10 cm sheath, Angiomax,  0.014 ASAHI blue 180 cm wire, 6-Haitian XBLAD 3.5 guide, 3.0 x 15 mm cutting  balloon, Perclose, and clopidogrel. PROCEDURE IN DETAIL:  The diagnostic study had just been completed by Dr. Sonja Phipps, identifying a mid LAD in-stent stenosis of maximum narrowing 95%  with SHANNAN grade 2 flow distally. After discussion with Dr. Sonja Phipps, we  decided to proceed with in-stent revascularization using a cutting balloon. The 5-Haitian sheath in the right femoral artery was exchanged over a wire  for a 6-Haitian 10-cm sheath. A 6-Haitian XBLAD 3.5 guide was used to intubate the left coronary ostium  and a 0.014 ASAHI blue 180 cm coronary wire was introduced into the LAD  distal to the stenosis. A 3.0 x 15 mm cutting balloon was used to perform serial inflations within  the stent. Following these inflations, they revealed 0% residual with SHANNAN grade 3  flow distally. The patient tolerated the procedure well. A Perclose device was used and clopidogrel was administered.     The patient was transferred back to the 8th floor for further monitoring  and management.       Khai Novoa MD    D: 12/15/2017 10:37:56       T: 12/15/2017 12:16:24     ERIKA/JOSE [email or web address]  Job#: [id number]     Doc#: [id number]  CC:

## 2017-12-18 ENCOUNTER — TELEPHONE (OUTPATIENT)
Dept: FAMILY MEDICINE CLINIC | Age: 67
End: 2017-12-18

## 2017-12-18 NOTE — TELEPHONE ENCOUNTER
[facility]    Call within 2 business days of discharge: Yes    Patient: Poonam Singh Patient : 1950   MRN: [id number]  Reason for Admission: There are no discharge diagnoses documented for the most recent discharge. Discharge Date: 12/15/17 RARS: Sofie MORRIS([phone])@     Spoke with: Patient    Facility: [unfilled]    Non-face-to-face services provided:  Scheduled appointment with PCP-17     Have the medications prescribed at discharge been filled? Yes  Does the patient understand what the medications are for? Yes  Has the patient experienced any new symptoms or have previous symptoms worsened? NO  Is the patient experiencing any pain? NO If yes, is the pain well controlled? N/A  We want to be sure the patient has the best recovery possible. Does the patient understand all the discharge instructions? Yes  Did someone talk to the patient and/or family about the patient needs prior to being discharged? Yes  Did the patient's doctor communicate well? Yes  Did the patient's nurse communicate well? Yes  Was the patient satisfied with the services and care received at [address] [place] [address]?  Yes          Follow Up  Future Appointments  Date Time Provider Dillon Jacome   2017 8:00 AM Omid Olivares NP [facility] [facility]   2018 12:45 PM Guru Shin MD [address] [facility]   2018 10:00 AM Guru Shin MD [facility]       Leeroy Cherry LPN

## 2017-12-19 ENCOUNTER — TELEPHONE (OUTPATIENT)
Dept: CARDIOLOGY CLINIC | Age: 67
End: 2017-12-19

## 2017-12-19 NOTE — TELEPHONE ENCOUNTER
Pt called today saying since starting the coreg HR has been running around 95   Having issues with palpitations and cant sleep at night due to the high HR   Denies SOB  Please advise

## 2017-12-22 ENCOUNTER — OFFICE VISIT (OUTPATIENT)
Dept: FAMILY MEDICINE CLINIC | Age: 67
End: 2017-12-22
Payer: MEDICARE

## 2017-12-22 VITALS
BODY MASS INDEX: 30.59 KG/M2 | RESPIRATION RATE: 20 BRPM | SYSTOLIC BLOOD PRESSURE: 130 MMHG | WEIGHT: 206.5 LBS | HEART RATE: 76 BPM | HEIGHT: 69 IN | DIASTOLIC BLOOD PRESSURE: 66 MMHG

## 2017-12-22 DIAGNOSIS — I10 ESSENTIAL HYPERTENSION: ICD-10-CM

## 2017-12-22 DIAGNOSIS — I25.110 CORONARY ARTERY DISEASE INVOLVING NATIVE CORONARY ARTERY OF NATIVE HEART WITH UNSTABLE ANGINA PECTORIS (HCC): Primary | ICD-10-CM

## 2017-12-22 DIAGNOSIS — E78.2 MIXED HYPERLIPIDEMIA: ICD-10-CM

## 2017-12-22 PROCEDURE — 99495 TRANSJ CARE MGMT MOD F2F 14D: CPT | Performed by: FAMILY MEDICINE

## 2017-12-22 PROCEDURE — 1111F DSCHRG MED/CURRENT MED MERGE: CPT | Performed by: FAMILY MEDICINE

## 2017-12-22 RX ORDER — ATENOLOL 25 MG/1
25 TABLET ORAL DAILY
COMMUNITY
End: 2018-02-08 | Stop reason: ALTCHOICE

## 2017-12-22 NOTE — PROGRESS NOTES
discharge - yes. Vitals:    12/22/17 0954   BP: 130/66   Site: Left Arm   Position: Sitting   Cuff Size: Large Adult   Pulse: 76   Resp: 20   Weight: 206 lb 8 oz (93.7 kg)   Height: 5' 9\" (1.753 m)     Body mass index is 30.49 kg/m². Wt Readings from Last 3 Encounters:   12/22/17 206 lb 8 oz (93.7 kg)   12/15/17 204 lb 6.4 oz (92.7 kg)   12/11/17 206 lb (93.4 kg)     BP Readings from Last 3 Encounters:   12/22/17 130/66   12/15/17 136/71   12/11/17 (!) 162/90        Inpatient course: Discharge summary reviewed- see chart. Chief Complaint   Patient presents with    [facility] 12/15/17    Discuss Medications     BP meds     History of Present illness - Follow up of Hospital diagnosis(es):   1. Coronary artery disease involving native coronary artery of native heart with unstable angina pectoris ([address]     2. Essential hypertension     3. Mixed hyperlipidemia         Non face to face  following discharge, date last encounter closed (first attempt may have been earlier): 12/18/2017 11:17 AM    Call initiated 2 business days of discharge: Yes     Interval history/Current status: good. Physical Exam:  General Appearance: alert and oriented to person, place and time, well-developed and well-nourished, in no acute distress  Pulmonary/Chest: clear to auscultation bilaterally- no wheezes, rales or rhonchi, normal air movement, no respiratory distress  Cardiovascular: normal rate, normal S1 and S2, no gallops, intact distal pulses and no carotid bruits  Abdomen: soft, non-tender, non-distended, normal bowel sounds, no masses or organomegaly  Extremities: no cyanosis and no clubbing  Musculoskeletal: normal range of motion, no joint swelling, deformity or tenderness  Neurologic: gait and coordination normal and speech normal    Assessment/Plan:  1. Coronary artery disease involving native coronary artery of native heart with unstable angina pectoris ([address]     2. Essential hypertension     3.

## 2017-12-26 NOTE — TELEPHONE ENCOUNTER
Patient called said unable to tolerate Lipitor, puts him in a \"funk, can hardly open his eyes. \"  He has tried lipitor, pravastatin and crestor both in the past with the same issues. Any other recommendations? Aware Dr. Geri Andrews.

## 2018-01-08 NOTE — TELEPHONE ENCOUNTER
Pt called and spoke to his daughter who is a pharmacist, she recommended either doing a low dose of crestor every other day 5 mg or 10mg or zetia 10 mg daily, pt wanted to check with you to see what you thought. Please advise he uses [facility].

## 2018-01-09 RX ORDER — ROSUVASTATIN CALCIUM 5 MG/1
5 TABLET, COATED ORAL EVERY OTHER DAY
Qty: 45 TABLET | Refills: 3 | Status: SHIPPED | OUTPATIENT
Start: 2018-01-09 | End: 2018-12-19

## 2018-02-08 ENCOUNTER — OFFICE VISIT (OUTPATIENT)
Dept: CARDIOLOGY CLINIC | Age: 68
End: 2018-02-08
Payer: MEDICARE

## 2018-02-08 VITALS
HEART RATE: 84 BPM | SYSTOLIC BLOOD PRESSURE: 138 MMHG | HEIGHT: 69 IN | WEIGHT: 211 LBS | BODY MASS INDEX: 31.25 KG/M2 | DIASTOLIC BLOOD PRESSURE: 74 MMHG

## 2018-02-08 DIAGNOSIS — I25.10 CORONARY ARTERY DISEASE INVOLVING NATIVE CORONARY ARTERY OF NATIVE HEART WITHOUT ANGINA PECTORIS: ICD-10-CM

## 2018-02-08 DIAGNOSIS — I10 ESSENTIAL HYPERTENSION: Primary | ICD-10-CM

## 2018-02-08 DIAGNOSIS — E78.01 FAMILIAL HYPERCHOLESTEROLEMIA: ICD-10-CM

## 2018-02-08 PROCEDURE — G8417 CALC BMI ABV UP PARAM F/U: HCPCS | Performed by: NUCLEAR MEDICINE

## 2018-02-08 PROCEDURE — 3017F COLORECTAL CA SCREEN DOC REV: CPT | Performed by: NUCLEAR MEDICINE

## 2018-02-08 PROCEDURE — 1123F ACP DISCUSS/DSCN MKR DOCD: CPT | Performed by: NUCLEAR MEDICINE

## 2018-02-08 PROCEDURE — 1036F TOBACCO NON-USER: CPT | Performed by: NUCLEAR MEDICINE

## 2018-02-08 PROCEDURE — G8482 FLU IMMUNIZE ORDER/ADMIN: HCPCS | Performed by: NUCLEAR MEDICINE

## 2018-02-08 PROCEDURE — G8427 DOCREV CUR MEDS BY ELIG CLIN: HCPCS | Performed by: NUCLEAR MEDICINE

## 2018-02-08 PROCEDURE — G8598 ASA/ANTIPLAT THER USED: HCPCS | Performed by: NUCLEAR MEDICINE

## 2018-02-08 PROCEDURE — 4040F PNEUMOC VAC/ADMIN/RCVD: CPT | Performed by: NUCLEAR MEDICINE

## 2018-02-08 PROCEDURE — 99214 OFFICE O/P EST MOD 30 MIN: CPT | Performed by: NUCLEAR MEDICINE

## 2018-02-08 RX ORDER — M-VIT,TX,IRON,MINS/CALC/FOLIC 27MG-0.4MG
1 TABLET ORAL DAILY
COMMUNITY

## 2018-02-08 RX ORDER — ISOSORBIDE MONONITRATE 30 MG/1
30 TABLET, EXTENDED RELEASE ORAL DAILY
COMMUNITY
End: 2019-02-04

## 2018-02-08 NOTE — PROGRESS NOTES
Systems  General:   No fever, no chills, some fatigue or weight loss  Pulmonary:    some dyspnea, no wheezing  Cardiac:    Denies recent chest pain,   GI:     No nausea or vomiting, no abdominal pain  Neuro:     No dizziness or light headedness,   Musculoskeletal:  No recent active issues  Extremities:   No edema, good peripheral pulses      Objective:  Physical Exam  /74   Pulse 84   Ht 5' 9\" (1.753 m)   Wt 211 lb (95.7 kg)   BMI 31.16 kg/m²   General:   Well developed, well nourished  Lungs:    Clear to auscultation  Heart:    Normal S1 S2, Slight murmur. no rubs, no gallops  Abdomen:   Soft, non tender, no organomegalies, positive bowel sounds  Extremities:   No edema, no cyanosis, good peripheral pulses  Neurological:   Awake, alert, oriented. No obvious focal deficits  Musculoskelatal:  No obvious deformities    Assessment:     1. Essential hypertension     2. Familial hypercholesterolemia     3. Coronary artery disease involving native coronary artery of native heart without angina pectoris     cardiac fair for now   No new issues  Statins issues       Plan:  No Follow-up on file. As above  We discussed statins at length   Continue risk factor modification and medical management  Thank you for allowing me to participate in the care of your patient. Please don't hesitate to contact me regarding any further issues related to the patient care    Orders Placed:  No orders of the defined types were placed in this encounter. Medications Prescribed:  No orders of the defined types were placed in this encounter. Discussed use, benefit, and side effects of prescribed medications. All patient questions answered. Pt voiced understanding. Instructed to continue current medications, diet and exercise. Continue risk factor modification and medical management. Patient agreed with treatment plan. Follow up as directed.     Electronically signed by Makenna Antonio MD on 2/8/2018 at 1:15 PM

## 2018-02-28 PROBLEM — I20.0 UNSTABLE ANGINA (HCC): Status: ACTIVE | Noted: 2018-02-28

## 2018-04-02 RX ORDER — ATENOLOL 25 MG/1
25 TABLET ORAL DAILY
Qty: 90 TABLET | Refills: 3 | Status: SHIPPED | OUTPATIENT
Start: 2018-04-02 | End: 2019-03-17 | Stop reason: SDUPTHER

## 2018-06-01 RX ORDER — CLOPIDOGREL BISULFATE 75 MG/1
TABLET ORAL
Qty: 90 TABLET | Refills: 1 | Status: SHIPPED | OUTPATIENT
Start: 2018-06-01 | End: 2018-11-30 | Stop reason: SDUPTHER

## 2018-06-01 RX ORDER — VALSARTAN AND HYDROCHLOROTHIAZIDE 160; 12.5 MG/1; MG/1
TABLET, FILM COATED ORAL
Qty: 90 TABLET | Refills: 1 | Status: SHIPPED | OUTPATIENT
Start: 2018-06-01 | End: 2018-07-31 | Stop reason: SINTOL

## 2018-06-20 ENCOUNTER — OFFICE VISIT (OUTPATIENT)
Dept: FAMILY MEDICINE CLINIC | Age: 68
End: 2018-06-20
Payer: MEDICARE

## 2018-06-20 VITALS
DIASTOLIC BLOOD PRESSURE: 60 MMHG | SYSTOLIC BLOOD PRESSURE: 118 MMHG | OXYGEN SATURATION: 98 % | WEIGHT: 211.8 LBS | HEART RATE: 63 BPM | HEIGHT: 70 IN | BODY MASS INDEX: 30.32 KG/M2 | RESPIRATION RATE: 13 BRPM

## 2018-06-20 DIAGNOSIS — R35.1 BPH ASSOCIATED WITH NOCTURIA: ICD-10-CM

## 2018-06-20 DIAGNOSIS — I25.110 CORONARY ARTERY DISEASE INVOLVING NATIVE CORONARY ARTERY OF NATIVE HEART WITH UNSTABLE ANGINA PECTORIS (HCC): ICD-10-CM

## 2018-06-20 DIAGNOSIS — E78.5 DYSLIPIDEMIA: ICD-10-CM

## 2018-06-20 DIAGNOSIS — N40.1 BPH ASSOCIATED WITH NOCTURIA: ICD-10-CM

## 2018-06-20 DIAGNOSIS — N32.81 OAB (OVERACTIVE BLADDER): ICD-10-CM

## 2018-06-20 DIAGNOSIS — Z13.1 ENCOUNTER FOR SCREENING FOR DIABETES MELLITUS: ICD-10-CM

## 2018-06-20 DIAGNOSIS — E78.2 MIXED HYPERLIPIDEMIA: ICD-10-CM

## 2018-06-20 DIAGNOSIS — N52.01 ERECTILE DYSFUNCTION DUE TO ARTERIAL INSUFFICIENCY: ICD-10-CM

## 2018-06-20 DIAGNOSIS — I20.8 ANGINA EFFORT: ICD-10-CM

## 2018-06-20 DIAGNOSIS — M50.30 DDD (DEGENERATIVE DISC DISEASE), CERVICAL: ICD-10-CM

## 2018-06-20 DIAGNOSIS — Z95.820 S/P ANGIOPLASTY WITH STENT: ICD-10-CM

## 2018-06-20 DIAGNOSIS — I10 ESSENTIAL HYPERTENSION: Primary | ICD-10-CM

## 2018-06-20 DIAGNOSIS — F51.01 PRIMARY INSOMNIA: ICD-10-CM

## 2018-06-20 PROCEDURE — G8417 CALC BMI ABV UP PARAM F/U: HCPCS | Performed by: FAMILY MEDICINE

## 2018-06-20 PROCEDURE — G8427 DOCREV CUR MEDS BY ELIG CLIN: HCPCS | Performed by: FAMILY MEDICINE

## 2018-06-20 PROCEDURE — 1123F ACP DISCUSS/DSCN MKR DOCD: CPT | Performed by: FAMILY MEDICINE

## 2018-06-20 PROCEDURE — 99214 OFFICE O/P EST MOD 30 MIN: CPT | Performed by: FAMILY MEDICINE

## 2018-06-20 PROCEDURE — 4040F PNEUMOC VAC/ADMIN/RCVD: CPT | Performed by: FAMILY MEDICINE

## 2018-06-20 PROCEDURE — G8598 ASA/ANTIPLAT THER USED: HCPCS | Performed by: FAMILY MEDICINE

## 2018-06-20 PROCEDURE — 1036F TOBACCO NON-USER: CPT | Performed by: FAMILY MEDICINE

## 2018-06-20 PROCEDURE — 3017F COLORECTAL CA SCREEN DOC REV: CPT | Performed by: FAMILY MEDICINE

## 2018-06-20 RX ORDER — SILDENAFIL CITRATE 20 MG/1
TABLET ORAL
Qty: 30 TABLET | Refills: 3 | Status: SHIPPED | OUTPATIENT
Start: 2018-06-20 | End: 2019-07-02

## 2018-06-20 RX ORDER — ZOLPIDEM TARTRATE 10 MG/1
10 TABLET ORAL NIGHTLY PRN
Qty: 30 TABLET | Refills: 3 | Status: SHIPPED | OUTPATIENT
Start: 2018-06-20 | End: 2018-07-20

## 2018-06-20 ASSESSMENT — PATIENT HEALTH QUESTIONNAIRE - PHQ9
1. LITTLE INTEREST OR PLEASURE IN DOING THINGS: 0
SUM OF ALL RESPONSES TO PHQ9 QUESTIONS 1 & 2: 0
2. FEELING DOWN, DEPRESSED OR HOPELESS: 0
SUM OF ALL RESPONSES TO PHQ QUESTIONS 1-9: 0

## 2018-06-20 ASSESSMENT — ENCOUNTER SYMPTOMS
SHORTNESS OF BREATH: 0
GASTROINTESTINAL NEGATIVE: 1
COUGH: 0
ALLERGIC/IMMUNOLOGIC NEGATIVE: 1
WHEEZING: 0
BACK PAIN: 1

## 2018-07-31 RX ORDER — IRBESARTAN AND HYDROCHLOROTHIAZIDE 150; 12.5 MG/1; MG/1
1 TABLET, FILM COATED ORAL DAILY
Qty: 30 TABLET | Refills: 1 | Status: SHIPPED | OUTPATIENT
Start: 2018-07-31 | End: 2018-09-20 | Stop reason: SINTOL

## 2018-08-27 ENCOUNTER — OFFICE VISIT (OUTPATIENT)
Dept: CARDIOLOGY CLINIC | Age: 68
End: 2018-08-27
Payer: MEDICARE

## 2018-08-27 VITALS
HEART RATE: 72 BPM | DIASTOLIC BLOOD PRESSURE: 70 MMHG | BODY MASS INDEX: 31.1 KG/M2 | SYSTOLIC BLOOD PRESSURE: 138 MMHG | WEIGHT: 210 LBS | HEIGHT: 69 IN

## 2018-08-27 DIAGNOSIS — I25.10 CORONARY ARTERY DISEASE INVOLVING NATIVE CORONARY ARTERY OF NATIVE HEART WITHOUT ANGINA PECTORIS: ICD-10-CM

## 2018-08-27 DIAGNOSIS — I10 ESSENTIAL HYPERTENSION: Primary | ICD-10-CM

## 2018-08-27 DIAGNOSIS — E78.01 FAMILIAL HYPERCHOLESTEROLEMIA: ICD-10-CM

## 2018-08-27 PROCEDURE — 4040F PNEUMOC VAC/ADMIN/RCVD: CPT | Performed by: NUCLEAR MEDICINE

## 2018-08-27 PROCEDURE — 1036F TOBACCO NON-USER: CPT | Performed by: NUCLEAR MEDICINE

## 2018-08-27 PROCEDURE — G8598 ASA/ANTIPLAT THER USED: HCPCS | Performed by: NUCLEAR MEDICINE

## 2018-08-27 PROCEDURE — 3017F COLORECTAL CA SCREEN DOC REV: CPT | Performed by: NUCLEAR MEDICINE

## 2018-08-27 PROCEDURE — G8417 CALC BMI ABV UP PARAM F/U: HCPCS | Performed by: NUCLEAR MEDICINE

## 2018-08-27 PROCEDURE — 1101F PT FALLS ASSESS-DOCD LE1/YR: CPT | Performed by: NUCLEAR MEDICINE

## 2018-08-27 PROCEDURE — G8427 DOCREV CUR MEDS BY ELIG CLIN: HCPCS | Performed by: NUCLEAR MEDICINE

## 2018-08-27 PROCEDURE — 99213 OFFICE O/P EST LOW 20 MIN: CPT | Performed by: NUCLEAR MEDICINE

## 2018-08-27 PROCEDURE — 1123F ACP DISCUSS/DSCN MKR DOCD: CPT | Performed by: NUCLEAR MEDICINE

## 2018-08-27 NOTE — PROGRESS NOTES
[address] [place] [address] CARDIOLOGY  [facility] [address]  [place] [address]  Dept: [phone]  Dept Fax: [phone]  Loc: [phone]    Visit Date: 8/27/2018    Doug Dugan is a 80 y.o. male who presents today for:  Chief Complaint   Patient presents with    6 Month Follow-Up    Coronary Artery Disease    Hypertension    Hyperlipidemia     Redo stent in the LAD last year   BP is stable  No chest pain   No changes in breathing  No dizziness  No syncope  Didn't tolerate higher dose statins     HPI:  HPI  Past Medical History:   Diagnosis Date    CAD (coronary artery disease)     Cancer ([facility] [address]     SKIN    DDD (degenerative disc disease), cervical 4/10/2017    GERD (gastroesophageal reflux disease)     Hyperlipidemia     Hypertension       Past Surgical History:   Procedure Laterality Date    CARDIAC CATHETERIZATION  08/20/2004    EF 60%    CARDIAC CATHETERIZATION  03/01/2003    EF 60%    CARDIOVASCULAR STRESS TEST  02/25/2010    EF 63%    CARDIOVASCULAR STRESS TEST  11/17/2006    EF 68%    CARDIOVASCULAR STRESS TEST  08/18/2005    EF 48%    CARDIOVASCULAR STRESS TEST  07/27/2004    EF 63%    CARDIOVASCULAR STRESS TEST  03/18/2003    EF 43%    DIAGNOSTIC CARDIAC CATH LAB PROCEDURE  08/18/2000    EF 70%    PTCA  07/19/2006    CYPHER STENT TO MID RCA    TRANSTHORACIC ECHOCARDIOGRAM  02/25/2010    EF 55-65%     Family History   Problem Relation Age of Onset    High Blood Pressure Mother     High Blood Pressure Father     Diabetes Sister     Heart Disease Maternal Grandmother     High Blood Pressure Maternal Grandmother     High Blood Pressure Maternal Grandfather     High Blood Pressure Paternal Grandmother     High Blood Pressure Paternal Grandfather     Diabetes Brother      Social History   Substance Use Topics    Smoking status: Former Smoker     Packs/day: 2.00     Years: 20.00     Types: Cigarettes     Quit date: 5/26/1992    Smokeless

## 2018-09-19 DIAGNOSIS — I10 ESSENTIAL HYPERTENSION: Primary | ICD-10-CM

## 2018-09-19 NOTE — TELEPHONE ENCOUNTER
Patient is stating the Avalide is causing him to be extremely nauseated and tired.  Wondering if there is something else that he can take

## 2018-09-20 RX ORDER — LOSARTAN POTASSIUM AND HYDROCHLOROTHIAZIDE 25; 100 MG/1; MG/1
1 TABLET ORAL DAILY
COMMUNITY
End: 2018-09-20 | Stop reason: SDUPTHER

## 2018-09-20 RX ORDER — LOSARTAN POTASSIUM AND HYDROCHLOROTHIAZIDE 25; 100 MG/1; MG/1
1 TABLET ORAL DAILY
Qty: 30 TABLET | Refills: 3 | Status: SHIPPED | OUTPATIENT
Start: 2018-09-20 | End: 2018-11-12 | Stop reason: ALTCHOICE

## 2018-11-12 ENCOUNTER — TELEPHONE (OUTPATIENT)
Dept: CARDIOLOGY CLINIC | Age: 68
End: 2018-11-12

## 2018-11-12 RX ORDER — VALSARTAN AND HYDROCHLOROTHIAZIDE 160; 12.5 MG/1; MG/1
1 TABLET, FILM COATED ORAL DAILY
COMMUNITY
End: 2018-11-12 | Stop reason: SDUPTHER

## 2018-11-12 RX ORDER — VALSARTAN AND HYDROCHLOROTHIAZIDE 160; 12.5 MG/1; MG/1
1 TABLET, FILM COATED ORAL DAILY
Qty: 90 TABLET | Refills: 3 | Status: SHIPPED | OUTPATIENT
Start: 2018-11-12 | End: 2019-12-19 | Stop reason: SDUPTHER

## 2018-12-03 RX ORDER — CLOPIDOGREL BISULFATE 75 MG/1
TABLET ORAL
Qty: 90 TABLET | Refills: 1 | Status: SHIPPED | OUTPATIENT
Start: 2018-12-03 | End: 2019-05-26 | Stop reason: SDUPTHER

## 2018-12-19 ENCOUNTER — OFFICE VISIT (OUTPATIENT)
Dept: FAMILY MEDICINE CLINIC | Age: 68
End: 2018-12-19
Payer: MEDICARE

## 2018-12-19 VITALS
RESPIRATION RATE: 16 BRPM | BODY MASS INDEX: 30.79 KG/M2 | HEART RATE: 64 BPM | WEIGHT: 215.1 LBS | HEIGHT: 70 IN | DIASTOLIC BLOOD PRESSURE: 78 MMHG | SYSTOLIC BLOOD PRESSURE: 134 MMHG

## 2018-12-19 DIAGNOSIS — I10 ESSENTIAL HYPERTENSION: ICD-10-CM

## 2018-12-19 DIAGNOSIS — F51.01 PRIMARY INSOMNIA: ICD-10-CM

## 2018-12-19 DIAGNOSIS — Z51.81 MEDICATION MONITORING ENCOUNTER: ICD-10-CM

## 2018-12-19 DIAGNOSIS — N32.81 OAB (OVERACTIVE BLADDER): ICD-10-CM

## 2018-12-19 DIAGNOSIS — R35.1 BPH ASSOCIATED WITH NOCTURIA: ICD-10-CM

## 2018-12-19 DIAGNOSIS — E78.5 DYSLIPIDEMIA: ICD-10-CM

## 2018-12-19 DIAGNOSIS — N40.1 BPH ASSOCIATED WITH NOCTURIA: ICD-10-CM

## 2018-12-19 DIAGNOSIS — I25.110 CORONARY ARTERY DISEASE INVOLVING NATIVE CORONARY ARTERY OF NATIVE HEART WITH UNSTABLE ANGINA PECTORIS (HCC): Primary | ICD-10-CM

## 2018-12-19 DIAGNOSIS — K21.9 GASTROESOPHAGEAL REFLUX DISEASE WITHOUT ESOPHAGITIS: ICD-10-CM

## 2018-12-19 DIAGNOSIS — E78.2 MIXED HYPERLIPIDEMIA: ICD-10-CM

## 2018-12-19 PROCEDURE — G8427 DOCREV CUR MEDS BY ELIG CLIN: HCPCS | Performed by: FAMILY MEDICINE

## 2018-12-19 PROCEDURE — 1101F PT FALLS ASSESS-DOCD LE1/YR: CPT | Performed by: FAMILY MEDICINE

## 2018-12-19 PROCEDURE — 1123F ACP DISCUSS/DSCN MKR DOCD: CPT | Performed by: FAMILY MEDICINE

## 2018-12-19 PROCEDURE — G8598 ASA/ANTIPLAT THER USED: HCPCS | Performed by: FAMILY MEDICINE

## 2018-12-19 PROCEDURE — 99214 OFFICE O/P EST MOD 30 MIN: CPT | Performed by: FAMILY MEDICINE

## 2018-12-19 PROCEDURE — 3017F COLORECTAL CA SCREEN DOC REV: CPT | Performed by: FAMILY MEDICINE

## 2018-12-19 PROCEDURE — G8482 FLU IMMUNIZE ORDER/ADMIN: HCPCS | Performed by: FAMILY MEDICINE

## 2018-12-19 PROCEDURE — 1036F TOBACCO NON-USER: CPT | Performed by: FAMILY MEDICINE

## 2018-12-19 PROCEDURE — G8417 CALC BMI ABV UP PARAM F/U: HCPCS | Performed by: FAMILY MEDICINE

## 2018-12-19 PROCEDURE — 4040F PNEUMOC VAC/ADMIN/RCVD: CPT | Performed by: FAMILY MEDICINE

## 2018-12-19 ASSESSMENT — ENCOUNTER SYMPTOMS
ALLERGIC/IMMUNOLOGIC NEGATIVE: 1
RESPIRATORY NEGATIVE: 1
SHORTNESS OF BREATH: 0
GASTROINTESTINAL NEGATIVE: 1

## 2018-12-19 NOTE — PROGRESS NOTES
Chronic Disease Visit Information    BP Readings from Last 3 Encounters:   12/19/18 (!) 150/86   08/27/18 138/70   06/20/18 118/60          LDL Calculated (mg/dL)   Date Value   12/14/2017 SEE BELOW     HDL (mg/dL)   Date Value   12/14/2017 32     BUN (mg/dL)   Date Value   12/14/2017 22     CREATININE (mg/dL)   Date Value   12/14/2017 1.0     Glucose (mg/dL)   Date Value   12/14/2017 102            Have you changed or started any medications since your last visit including any over-the-counter medicines, vitamins, or herbal medicines? no   Are you having any side effects from any of your medications? -  no  Have you stopped taking any of your medications? Is so, why? -  no    Have you seen any other physician or provider since your last visit? No  Have you had any other diagnostic tests since your last visit? No  Have you been seen in the emergency room and/or had an admission to a hospital since we last saw you? No  Have you had your annual diabetic retinal (eye) exam? N/A  Have you had your routine dental cleaning in the past 6 months? no    Have you activated your [employer] account? If not, what are your barriers?  Yes     Patient Care Team:  Markos Walker MD as PCP - General (Family Medicine)  Markos Walker MD as PCP - [facility] Attributed Provider         Medical History Review  Past Medical, Family, and Social History reviewed and does contribute to the patient presenting condition    Health Maintenance   Topic Date Due    AAA screen  1950    Hepatitis C screen  1950    DTaP/Tdap/Td vaccine (1 - Tdap) 03/28/1970    Diabetes screen  03/28/1991    Shingles Vaccine (1 of 2 - 2 Dose Series) 03/28/2001    Colon cancer screen colonoscopy  03/28/2001    Potassium monitoring  12/14/2018    Creatinine monitoring  12/14/2018    Lipid screen  12/14/2022    Flu vaccine  Completed    Pneumococcal low/med risk  Completed

## 2018-12-19 NOTE — PROGRESS NOTES
Subjective:      Patient ID: Day Ferrera is a 80 y.o. male. HPI  Follow up of chronic conditions. Encounter Diagnoses   Name Primary?  Coronary artery disease involving native coronary artery of native heart with unstable angina pectoris (HCC) Yes    Dyslipidemia     OAB (overactive bladder)     BPH associated with nocturia     Gastroesophageal reflux disease without esophagitis     Essential hypertension     Mixed hyperlipidemia     Primary insomnia     Medication monitoring encounter      Patient comes in for follow-up of his coronary artery disease. He occasionally will have \"some skipped beats in his chest\" but these are infrequent and they are not associated with indigestion, heartburn, shortness of breath, or exertion. He is status post stent placement and doing well. He has mild symptoms of urinary frequency which has been attributed to overactive bladder in the past but he feels it's mostly associated with mild BPH symptoms. He continues to do well on tamsulosin 0.4 mg daily. HTN is stable with current medications. Pt has no medication side effects nor orthostatic symptoms. BP Readings from Last 3 Encounters:   12/19/18 134/78   08/27/18 138/70   06/20/18 118/60     His gastroesophageal reflux and stomach discomfort continues to respond to omeprazole which she takes regularly. This completely resolves all GI symptoms for him. Patient is not tolerant to lipid lowering agents consequently he has elevated cholesterol and triglyceride levels. This is being followed by his cardiologist.  Cholesterol, Total (mg/dL)   Date Value   12/14/2017 293 (H)     HDL (mg/dL)   Date Value   12/14/2017 32     Triglycerides (mg/dL)   Date Value   12/14/2017 491 (H)     Lab Results   Component Value Date    LDLCALC SEE BELOW 12/14/2017     He continues to have insomnia but states \"I'm just living with it\" rather than using medications. The rest of this patient's conditions are stable. present. Cardiovascular: Normal rate, regular rhythm, S1 normal, S2 normal, normal heart sounds and intact distal pulses. No extrasystoles are present. Exam reveals no gallop. No murmur heard. Pulmonary/Chest: Effort normal and breath sounds normal. He has no wheezes. He has no rales. Abdominal: Soft. Bowel sounds are normal.   Musculoskeletal: He exhibits no edema. Lymphadenopathy:     He has no cervical adenopathy. Neurological: He is alert and oriented to person, place, and time. Skin: Skin is warm and dry. Psychiatric: He has a normal mood and affect. Nursing note and vitals reviewed. Assessment:       Diagnosis Orders   1. Coronary artery disease involving native coronary artery of native heart with unstable angina pectoris ([facility] [address]     2. Dyslipidemia     3. OAB (overactive bladder)     4. BPH associated with nocturia     5. Gastroesophageal reflux disease without esophagitis     6. Essential hypertension     7. Mixed hyperlipidemia     8. Primary insomnia     9. Medication monitoring encounter             Plan:      No orders of the defined types were placed in this encounter. Medications Discontinued During This Encounter   Medication Reason    rosuvastatin (CRESTOR) 5 MG tablet Allergic response     Current Outpatient Prescriptions   Medication Sig Dispense Refill    clopidogrel (PLAVIX) 75 MG tablet TAKE 1 TABLET BY MOUTH ONCE DAILY 90 tablet 1    valsartan-hydrochlorothiazide (DIOVAN-HCT) 160-12.5 MG per tablet Take 1 tablet by mouth daily 90 tablet 3    sildenafil (REVATIO) 20 MG tablet Use as directed.  30 tablet 3    atenolol (TENORMIN) 25 MG tablet Take 1 tablet by mouth daily 90 tablet 3    Omeprazole (PRILOSEC PO) Take by mouth daily      Multiple Vitamins-Minerals (THERAPEUTIC MULTIVITAMIN-MINERALS) tablet Take 1 tablet by mouth daily      Ascorbic Acid (VITAMIN C PO) Take 1,000 mg by mouth      isosorbide mononitrate (IMDUR) 30 MG extended release tablet Take 30 mg

## 2018-12-26 ENCOUNTER — TELEPHONE (OUTPATIENT)
Dept: FAMILY MEDICINE CLINIC | Age: 68
End: 2018-12-26

## 2018-12-26 DIAGNOSIS — L98.9 SKIN LESION: ICD-10-CM

## 2018-12-26 DIAGNOSIS — H91.93 BILATERAL HEARING LOSS, UNSPECIFIED HEARING LOSS TYPE: Primary | ICD-10-CM

## 2018-12-26 PROBLEM — H93.13 BILATERAL TINNITUS: Status: ACTIVE | Noted: 2018-12-26

## 2018-12-26 NOTE — TELEPHONE ENCOUNTER
Sy Smith    Called to schedule the patient to see Dr. Karri العلي, staff stated he had called and scheduled himself for 1/2/19 at 12:45pm.

## 2019-01-08 RX ORDER — IRBESARTAN AND HYDROCHLOROTHIAZIDE 150; 12.5 MG/1; MG/1
TABLET, FILM COATED ORAL
Qty: 90 TABLET | Refills: 1 | Status: SHIPPED | OUTPATIENT
Start: 2019-01-08 | End: 2019-01-23

## 2019-01-18 ENCOUNTER — PATIENT MESSAGE (OUTPATIENT)
Dept: CARDIOLOGY CLINIC | Age: 69
End: 2019-01-18

## 2019-02-04 ENCOUNTER — PROCEDURE VISIT (OUTPATIENT)
Dept: CARDIOLOGY CLINIC | Age: 69
End: 2019-02-04
Payer: MEDICARE

## 2019-02-04 VITALS — SYSTOLIC BLOOD PRESSURE: 142 MMHG | DIASTOLIC BLOOD PRESSURE: 70 MMHG | HEART RATE: 65 BPM

## 2019-02-04 DIAGNOSIS — R00.2 PALPITATION: ICD-10-CM

## 2019-02-04 DIAGNOSIS — I25.110 CORONARY ARTERY DISEASE INVOLVING NATIVE CORONARY ARTERY OF NATIVE HEART WITH UNSTABLE ANGINA PECTORIS (HCC): Primary | ICD-10-CM

## 2019-02-04 DIAGNOSIS — I10 ESSENTIAL HYPERTENSION: ICD-10-CM

## 2019-02-04 DIAGNOSIS — E78.01 FAMILIAL HYPERCHOLESTEROLEMIA: ICD-10-CM

## 2019-02-04 PROCEDURE — 3017F COLORECTAL CA SCREEN DOC REV: CPT | Performed by: NUCLEAR MEDICINE

## 2019-02-04 PROCEDURE — G8598 ASA/ANTIPLAT THER USED: HCPCS | Performed by: NUCLEAR MEDICINE

## 2019-02-04 PROCEDURE — 93000 ELECTROCARDIOGRAM COMPLETE: CPT | Performed by: NUCLEAR MEDICINE

## 2019-02-04 PROCEDURE — G8427 DOCREV CUR MEDS BY ELIG CLIN: HCPCS | Performed by: NUCLEAR MEDICINE

## 2019-02-04 PROCEDURE — 99214 OFFICE O/P EST MOD 30 MIN: CPT | Performed by: NUCLEAR MEDICINE

## 2019-02-04 PROCEDURE — 1101F PT FALLS ASSESS-DOCD LE1/YR: CPT | Performed by: NUCLEAR MEDICINE

## 2019-02-04 PROCEDURE — G8417 CALC BMI ABV UP PARAM F/U: HCPCS | Performed by: NUCLEAR MEDICINE

## 2019-02-04 PROCEDURE — G8482 FLU IMMUNIZE ORDER/ADMIN: HCPCS | Performed by: NUCLEAR MEDICINE

## 2019-02-04 PROCEDURE — 1036F TOBACCO NON-USER: CPT | Performed by: NUCLEAR MEDICINE

## 2019-02-04 PROCEDURE — 4040F PNEUMOC VAC/ADMIN/RCVD: CPT | Performed by: NUCLEAR MEDICINE

## 2019-02-04 PROCEDURE — 1123F ACP DISCUSS/DSCN MKR DOCD: CPT | Performed by: NUCLEAR MEDICINE

## 2019-02-07 ENCOUNTER — HOSPITAL ENCOUNTER (OUTPATIENT)
Dept: NON INVASIVE DIAGNOSTICS | Age: 69
Discharge: HOME | End: 2019-02-07
Payer: MEDICARE

## 2019-02-07 DIAGNOSIS — I25.110 CORONARY ARTERY DISEASE INVOLVING NATIVE CORONARY ARTERY OF NATIVE HEART WITH UNSTABLE ANGINA PECTORIS (HCC): ICD-10-CM

## 2019-02-07 DIAGNOSIS — R00.2 PALPITATION: ICD-10-CM

## 2019-02-07 PROCEDURE — 93225 XTRNL ECG REC<48 HRS REC: CPT

## 2019-02-07 PROCEDURE — 93226 XTRNL ECG REC<48 HR SCAN A/R: CPT

## 2019-02-08 LAB
ACQUISITION DURATION: NORMAL S
AVERAGE HEART RATE: 70 BPM
HOOKUP DATE: NORMAL
HOOKUP TIME: NORMAL
MAX HEART RATE TIME/DATE: NORMAL
MAX HEART RATE: 114 BPM
MIN HEART RATE TIME/DATE: NORMAL
MIN HEART RATE: 51 BPM
NUMBER OF QRS COMPLEXES: NORMAL
NUMBER OF SUPRAVENTRICULAR BEATS IN RUNS: 0
NUMBER OF SUPRAVENTRICULAR COUPLETS: 8
NUMBER OF SUPRAVENTRICULAR ECTOPICS: 2437
NUMBER OF SUPRAVENTRICULAR ISOLATED BEATS: 2421
NUMBER OF SUPRAVENTRICULAR RUNS: 0
NUMBER OF VENTRICULAR BEATS IN RUNS: 0
NUMBER OF VENTRICULAR BIGEMINAL CYCLES: 0
NUMBER OF VENTRICULAR COUPLETS: 1
NUMBER OF VENTRICULAR ECTOPICS: 1099
NUMBER OF VENTRICULAR ISOLATED BEATS: 1097
NUMBER OF VENTRICULAR RUNS: 0

## 2019-02-11 ENCOUNTER — TELEPHONE (OUTPATIENT)
Dept: CARDIOLOGY CLINIC | Age: 69
End: 2019-02-11

## 2019-02-20 LAB — TSH SERPL DL<=0.05 MIU/L-ACNC: 6.31 UIU/ML

## 2019-02-21 ENCOUNTER — TELEPHONE (OUTPATIENT)
Dept: FAMILY MEDICINE CLINIC | Age: 69
End: 2019-02-21

## 2019-02-21 DIAGNOSIS — R53.83 FATIGUE, UNSPECIFIED TYPE: ICD-10-CM

## 2019-02-21 DIAGNOSIS — E03.9 ACQUIRED HYPOTHYROIDISM: ICD-10-CM

## 2019-02-21 DIAGNOSIS — Z51.81 MEDICATION MONITORING ENCOUNTER: Primary | ICD-10-CM

## 2019-02-21 RX ORDER — LEVOTHYROXINE SODIUM 25 MCG
25 TABLET ORAL DAILY
Qty: 30 TABLET | Refills: 2 | Status: SHIPPED | OUTPATIENT
Start: 2019-02-21 | End: 2019-05-15 | Stop reason: SDUPTHER

## 2019-03-18 RX ORDER — ATENOLOL 25 MG/1
TABLET ORAL
Qty: 90 TABLET | Refills: 3 | Status: ON HOLD | OUTPATIENT
Start: 2019-03-18 | End: 2019-10-10 | Stop reason: SDUPTHER

## 2019-04-05 LAB — TSH SERPL DL<=0.05 MIU/L-ACNC: 2.34 UIU/ML (ref 0.49–4.67)

## 2019-05-15 ENCOUNTER — OFFICE VISIT (OUTPATIENT)
Dept: FAMILY MEDICINE CLINIC | Age: 69
End: 2019-05-15
Payer: MEDICARE

## 2019-05-15 VITALS
WEIGHT: 207.8 LBS | OXYGEN SATURATION: 98 % | HEART RATE: 70 BPM | HEIGHT: 69 IN | TEMPERATURE: 97.7 F | SYSTOLIC BLOOD PRESSURE: 136 MMHG | BODY MASS INDEX: 30.78 KG/M2 | DIASTOLIC BLOOD PRESSURE: 74 MMHG | RESPIRATION RATE: 16 BRPM

## 2019-05-15 DIAGNOSIS — N34.2 INFECTIVE URETHRITIS: Primary | ICD-10-CM

## 2019-05-15 DIAGNOSIS — R35.0 URINARY FREQUENCY: ICD-10-CM

## 2019-05-15 DIAGNOSIS — E03.9 ACQUIRED HYPOTHYROIDISM: ICD-10-CM

## 2019-05-15 LAB
BILIRUBIN, POC: NEGATIVE
BLOOD URINE, POC: NEGATIVE
CLARITY, POC: CLEAR
COLOR, POC: YELLOW
GLUCOSE URINE, POC: NEGATIVE
KETONES, POC: NEGATIVE
LEUKOCYTE EST, POC: NEGATIVE
NITRITE, POC: NEGATIVE
PH, POC: 5.5
PROTEIN, POC: NEGATIVE
SPECIFIC GRAVITY, POC: 1.01
UROBILINOGEN, POC: ABNORMAL

## 2019-05-15 PROCEDURE — 1123F ACP DISCUSS/DSCN MKR DOCD: CPT | Performed by: NURSE PRACTITIONER

## 2019-05-15 PROCEDURE — 81003 URINALYSIS AUTO W/O SCOPE: CPT | Performed by: NURSE PRACTITIONER

## 2019-05-15 PROCEDURE — 4040F PNEUMOC VAC/ADMIN/RCVD: CPT | Performed by: NURSE PRACTITIONER

## 2019-05-15 PROCEDURE — 3017F COLORECTAL CA SCREEN DOC REV: CPT | Performed by: NURSE PRACTITIONER

## 2019-05-15 PROCEDURE — 1036F TOBACCO NON-USER: CPT | Performed by: NURSE PRACTITIONER

## 2019-05-15 PROCEDURE — G8598 ASA/ANTIPLAT THER USED: HCPCS | Performed by: NURSE PRACTITIONER

## 2019-05-15 PROCEDURE — G8417 CALC BMI ABV UP PARAM F/U: HCPCS | Performed by: NURSE PRACTITIONER

## 2019-05-15 PROCEDURE — 99213 OFFICE O/P EST LOW 20 MIN: CPT | Performed by: NURSE PRACTITIONER

## 2019-05-15 PROCEDURE — G8427 DOCREV CUR MEDS BY ELIG CLIN: HCPCS | Performed by: NURSE PRACTITIONER

## 2019-05-15 RX ORDER — CIPROFLOXACIN 500 MG/1
500 TABLET, FILM COATED ORAL 2 TIMES DAILY
Qty: 20 TABLET | Refills: 0 | Status: SHIPPED | OUTPATIENT
Start: 2019-05-15 | End: 2019-06-03 | Stop reason: SDUPTHER

## 2019-05-15 RX ORDER — LEVOTHYROXINE SODIUM 25 MCG
25 TABLET ORAL DAILY
Qty: 90 TABLET | Refills: 3 | Status: SHIPPED | OUTPATIENT
Start: 2019-05-15 | End: 2020-06-15

## 2019-05-15 RX ORDER — LEVOTHYROXINE SODIUM 25 MCG
25 TABLET ORAL DAILY
Qty: 30 TABLET | Refills: 5 | Status: SHIPPED | OUTPATIENT
Start: 2019-05-15 | End: 2019-05-15 | Stop reason: SDUPTHER

## 2019-05-15 ASSESSMENT — PATIENT HEALTH QUESTIONNAIRE - PHQ9
SUM OF ALL RESPONSES TO PHQ QUESTIONS 1-9: 0
SUM OF ALL RESPONSES TO PHQ9 QUESTIONS 1 & 2: 0
SUM OF ALL RESPONSES TO PHQ QUESTIONS 1-9: 0
2. FEELING DOWN, DEPRESSED OR HOPELESS: 0
1. LITTLE INTEREST OR PLEASURE IN DOING THINGS: 0

## 2019-05-15 NOTE — PROGRESS NOTES
Visit Information    Have you changed or started any medications since your last visit including any over-the-counter medicines, vitamins, or herbal medicines? no   Are you having any side effects from any of your medications? -  no  Have you stopped taking any of your medications? Is so, why? -  no    Have you seen any other physician or provider since your last visit? No  Have you had any other diagnostic tests since your last visit? No  Have you been seen in the emergency room and/or had an admission to a hospital since we last saw you? No    Have you activated your [employer] account? If not, what are your barriers?  Yes     Patient Care Team:  Ryland Castillo MD as PCP - General (Family Medicine)  Ryland Castillo MD as PCP - S Attributed Provider    Medical History Review  Past Medical, Family, and Social History reviewed and does not contribute to the patient presenting condition    Health Maintenance   Topic Date Due    AAA screen  1950    Hepatitis C screen  1950    DTaP/Tdap/Td vaccine (1 - Tdap) 03/28/1970    Diabetes screen  03/28/1991    Shingles Vaccine (1 of 2) 03/28/2001    Colon cancer screen colonoscopy  03/28/2001    Potassium monitoring  12/14/2018    Creatinine monitoring  12/14/2018    TSH testing  04/04/2020    Lipid screen  12/14/2022    Flu vaccine  Completed    Pneumococcal 65+ years Vaccine  Completed
Objective:   Physical Exam   Constitutional: Vital signs are normal. No distress. Eyes: Pupils are equal, round, and reactive to light. EOM are normal.   Neck: Normal range of motion. Neck supple. Cardiovascular: Normal rate and regular rhythm. No murmur heard. Pulmonary/Chest: Effort normal and breath sounds normal. He has no wheezes. Abdominal: Soft. Bowel sounds are normal. He exhibits no distension. There is no tenderness. Musculoskeletal: Normal range of motion. He exhibits no tenderness. Skin: Skin is warm and dry. No rash noted. Assessment:       Diagnosis Orders   1. Infective urethritis  ciprofloxacin (CIPRO) 500 MG tablet   2. Urinary frequency  POCT Urinalysis No Micro (Auto)    Urine Culture    Urine Culture   3.  Acquired hypothyroidism  SYNTHROID 25 MCG tablet    DISCONTINUED: SYNTHROID 25 MCG tablet           Plan:      UA: NEG  Urine Culture sent  Cipro BID x 10 days  Push fluids  Refill as above  RTO if symptoms worsen or stay the same          Idania Ortega, APRN - CNP

## 2019-05-16 ASSESSMENT — ENCOUNTER SYMPTOMS
COUGH: 0
NAUSEA: 0
SHORTNESS OF BREATH: 0
ABDOMINAL PAIN: 0

## 2019-05-17 LAB — URINE CULTURE, ROUTINE: NORMAL

## 2019-05-28 RX ORDER — CLOPIDOGREL BISULFATE 75 MG/1
TABLET ORAL
Qty: 90 TABLET | Refills: 3 | Status: SHIPPED | OUTPATIENT
Start: 2019-05-28 | End: 2019-07-02

## 2019-06-03 ENCOUNTER — TELEPHONE (OUTPATIENT)
Dept: FAMILY MEDICINE CLINIC | Age: 69
End: 2019-06-03

## 2019-06-03 DIAGNOSIS — N34.2 INFECTIVE URETHRITIS: ICD-10-CM

## 2019-06-03 RX ORDER — CIPROFLOXACIN 500 MG/1
500 TABLET, FILM COATED ORAL 2 TIMES DAILY
Qty: 20 TABLET | Refills: 0 | Status: SHIPPED | OUTPATIENT
Start: 2019-06-03 | End: 2019-06-13

## 2019-06-19 ENCOUNTER — OFFICE VISIT (OUTPATIENT)
Dept: FAMILY MEDICINE CLINIC | Age: 69
End: 2019-06-19
Payer: MEDICARE

## 2019-06-19 ENCOUNTER — HOSPITAL ENCOUNTER (OUTPATIENT)
Age: 69
Discharge: HOME | End: 2019-06-19
Payer: MEDICARE

## 2019-06-19 VITALS
HEIGHT: 69 IN | WEIGHT: 211.9 LBS | DIASTOLIC BLOOD PRESSURE: 78 MMHG | SYSTOLIC BLOOD PRESSURE: 116 MMHG | RESPIRATION RATE: 16 BRPM | OXYGEN SATURATION: 98 % | BODY MASS INDEX: 31.39 KG/M2 | HEART RATE: 57 BPM

## 2019-06-19 DIAGNOSIS — I10 ESSENTIAL HYPERTENSION: ICD-10-CM

## 2019-06-19 DIAGNOSIS — N32.81 OAB (OVERACTIVE BLADDER): ICD-10-CM

## 2019-06-19 DIAGNOSIS — N40.1 BPH ASSOCIATED WITH NOCTURIA: ICD-10-CM

## 2019-06-19 DIAGNOSIS — R35.1 BPH ASSOCIATED WITH NOCTURIA: ICD-10-CM

## 2019-06-19 DIAGNOSIS — R53.83 FATIGUE, UNSPECIFIED TYPE: ICD-10-CM

## 2019-06-19 DIAGNOSIS — Z51.81 MEDICATION MONITORING ENCOUNTER: ICD-10-CM

## 2019-06-19 DIAGNOSIS — Z00.00 ROUTINE GENERAL MEDICAL EXAMINATION AT A HEALTH CARE FACILITY: ICD-10-CM

## 2019-06-19 DIAGNOSIS — R35.0 URINARY FREQUENCY: Primary | ICD-10-CM

## 2019-06-19 DIAGNOSIS — R35.0 URINARY FREQUENCY: ICD-10-CM

## 2019-06-19 DIAGNOSIS — I25.110 CORONARY ARTERY DISEASE INVOLVING NATIVE CORONARY ARTERY OF NATIVE HEART WITH UNSTABLE ANGINA PECTORIS (HCC): ICD-10-CM

## 2019-06-19 LAB
BASOPHILS # BLD: 1.2 %
BASOPHILS ABSOLUTE: 0.1 THOU/MM3 (ref 0–0.1)
BILIRUBIN URINE: NEGATIVE
BLOOD, URINE: NEGATIVE
CHARACTER, URINE: CLEAR
COLOR: YELLOW
EOSINOPHIL # BLD: 3.4 %
EOSINOPHILS ABSOLUTE: 0.2 THOU/MM3 (ref 0–0.4)
ERYTHROCYTE [DISTWIDTH] IN BLOOD BY AUTOMATED COUNT: 13.4 % (ref 11.5–14.5)
ERYTHROCYTE [DISTWIDTH] IN BLOOD BY AUTOMATED COUNT: 43.4 FL (ref 35–45)
GLUCOSE URINE: NEGATIVE MG/DL
HCT VFR BLD CALC: 47.9 % (ref 42–52)
HEMOGLOBIN: 16.5 GM/DL (ref 14–18)
IMMATURE GRANS (ABS): 0.01 THOU/MM3 (ref 0–0.07)
IMMATURE GRANULOCYTES: 0.1 %
KETONES, URINE: NEGATIVE
LEUKOCYTE ESTERASE, URINE: NEGATIVE
LYMPHOCYTES # BLD: 32.5 %
LYMPHOCYTES ABSOLUTE: 2.2 THOU/MM3 (ref 1–4.8)
MCH RBC QN AUTO: 30.7 PG (ref 26–33)
MCHC RBC AUTO-ENTMCNC: 34.4 GM/DL (ref 32.2–35.5)
MCV RBC AUTO: 89.2 FL (ref 80–94)
MONOCYTES # BLD: 9.3 %
MONOCYTES ABSOLUTE: 0.6 THOU/MM3 (ref 0.4–1.3)
NITRITE, URINE: NEGATIVE
NUCLEATED RED BLOOD CELLS: 0 /100 WBC
PH UA: 6 (ref 5–9)
PLATELET # BLD: 165 THOU/MM3 (ref 130–400)
PMV BLD AUTO: 11.2 FL (ref 9.4–12.4)
PROSTATE SPECIFIC ANTIGEN: 0.62 NG/ML (ref 0–1)
PROTEIN UA: NEGATIVE
RBC # BLD: 5.37 MILL/MM3 (ref 4.7–6.1)
SEG NEUTROPHILS: 53.5 %
SEGMENTED NEUTROPHILS ABSOLUTE COUNT: 3.6 THOU/MM3 (ref 1.8–7.7)
SPECIFIC GRAVITY, URINE: 1.01 (ref 1–1.03)
TSH SERPL DL<=0.05 MIU/L-ACNC: 4.01 UIU/ML (ref 0.4–4.2)
UROBILINOGEN, URINE: 0.2 EU/DL (ref 0–1)
WBC # BLD: 6.8 THOU/MM3 (ref 4.8–10.8)

## 2019-06-19 PROCEDURE — 84153 ASSAY OF PSA TOTAL: CPT

## 2019-06-19 PROCEDURE — 4040F PNEUMOC VAC/ADMIN/RCVD: CPT | Performed by: FAMILY MEDICINE

## 2019-06-19 PROCEDURE — 1123F ACP DISCUSS/DSCN MKR DOCD: CPT | Performed by: FAMILY MEDICINE

## 2019-06-19 PROCEDURE — 36415 COLL VENOUS BLD VENIPUNCTURE: CPT

## 2019-06-19 PROCEDURE — 1036F TOBACCO NON-USER: CPT | Performed by: FAMILY MEDICINE

## 2019-06-19 PROCEDURE — G0438 PPPS, INITIAL VISIT: HCPCS | Performed by: FAMILY MEDICINE

## 2019-06-19 PROCEDURE — G8417 CALC BMI ABV UP PARAM F/U: HCPCS | Performed by: FAMILY MEDICINE

## 2019-06-19 PROCEDURE — G8598 ASA/ANTIPLAT THER USED: HCPCS | Performed by: FAMILY MEDICINE

## 2019-06-19 PROCEDURE — G8427 DOCREV CUR MEDS BY ELIG CLIN: HCPCS | Performed by: FAMILY MEDICINE

## 2019-06-19 PROCEDURE — 3017F COLORECTAL CA SCREEN DOC REV: CPT | Performed by: FAMILY MEDICINE

## 2019-06-19 PROCEDURE — 84443 ASSAY THYROID STIM HORMONE: CPT

## 2019-06-19 PROCEDURE — 99214 OFFICE O/P EST MOD 30 MIN: CPT | Performed by: FAMILY MEDICINE

## 2019-06-19 PROCEDURE — 85025 COMPLETE CBC W/AUTO DIFF WBC: CPT

## 2019-06-19 PROCEDURE — 81003 URINALYSIS AUTO W/O SCOPE: CPT

## 2019-06-19 ASSESSMENT — LIFESTYLE VARIABLES
AUDIT-C TOTAL SCORE: 3
HOW OFTEN DO YOU HAVE A DRINK CONTAINING ALCOHOL: 3
AUDIT TOTAL SCORE: 3
HOW MANY STANDARD DRINKS CONTAINING ALCOHOL DO YOU HAVE ON A TYPICAL DAY: 0
HOW OFTEN DURING THE LAST YEAR HAVE YOU FOUND THAT YOU WERE NOT ABLE TO STOP DRINKING ONCE YOU HAD STARTED: 0
HAVE YOU OR SOMEONE ELSE BEEN INJURED AS A RESULT OF YOUR DRINKING: 0
HOW OFTEN DO YOU HAVE SIX OR MORE DRINKS ON ONE OCCASION: 0
HOW OFTEN DURING THE LAST YEAR HAVE YOU FAILED TO DO WHAT WAS NORMALLY EXPECTED FROM YOU BECAUSE OF DRINKING: 0
HOW OFTEN DURING THE LAST YEAR HAVE YOU HAD A FEELING OF GUILT OR REMORSE AFTER DRINKING: 0
HOW OFTEN DURING THE LAST YEAR HAVE YOU BEEN UNABLE TO REMEMBER WHAT HAPPENED THE NIGHT BEFORE BECAUSE YOU HAD BEEN DRINKING: 0
HAS A RELATIVE, FRIEND, DOCTOR, OR ANOTHER HEALTH PROFESSIONAL EXPRESSED CONCERN ABOUT YOUR DRINKING OR SUGGESTED YOU CUT DOWN: 0
HOW OFTEN DURING THE LAST YEAR HAVE YOU NEEDED AN ALCOHOLIC DRINK FIRST THING IN THE MORNING TO GET YOURSELF GOING AFTER A NIGHT OF HEAVY DRINKING: 0

## 2019-06-19 ASSESSMENT — ANXIETY QUESTIONNAIRES: GAD7 TOTAL SCORE: 0

## 2019-06-19 ASSESSMENT — ENCOUNTER SYMPTOMS
RESPIRATORY NEGATIVE: 1
GASTROINTESTINAL NEGATIVE: 1
SHORTNESS OF BREATH: 0

## 2019-06-19 ASSESSMENT — PATIENT HEALTH QUESTIONNAIRE - PHQ9
SUM OF ALL RESPONSES TO PHQ QUESTIONS 1-9: 0
SUM OF ALL RESPONSES TO PHQ QUESTIONS 1-9: 0

## 2019-06-19 NOTE — PROGRESS NOTES
Visit Information    Have you changed or started any medications since your last visit including any over-the-counter medicines, vitamins, or herbal medicines? no   Are you having any side effects from any of your medications? -  no  Have you stopped taking any of your medications? Is so, why? -  no    Have you seen any other physician or provider since your last visit? Yes - Records Obtained  Have you had any other diagnostic tests since your last visit? No  Have you been seen in the emergency room and/or had an admission to a hospital since we last saw you? No  Have you had your routine dental cleaning in the past 6 months? na    Have you activated your [employer] account? If not, what are your barriers? Yes     Patient Care Team:  Jamar Baxter MD as PCP - General (Family Medicine)  Jamar Baxter MD as PCP - [facility]    Medical History Review  Past Medical, Family, and Social History reviewed and does contribute to the patient presenting condition    Health Maintenance   Topic Date Due    AAA screen  1950    Hepatitis C screen  1950    DTaP/Tdap/Td vaccine (1 - Tdap) 1970    Diabetes screen  1991    Shingles Vaccine (1 of 2) 2001    Colon cancer screen colonoscopy  2001    Annual Wellness Visit (AWV)  2014    Potassium monitoring  2018    Creatinine monitoring  2018    TSH testing  2020    Lipid screen  2022    Flu vaccine  Completed    Pneumococcal 65+ years Vaccine  Completed     Medicare Annual Wellness Visit  Name: Hunter Harry Date: 2019   MRN: [id number] Sex: Male   Age: 77 y.o. Ethnicity: Non-/Non    : 1950 Race: Vipin Zack is here for Medicare AWV    Screenings for behavioral, psychosocial and functional/safety risks, and cognitive dysfunction are all negative except as indicated below.  These results, as well as other patient data from the [facility] Risk Assessment form, are documented in Flowsheets linked to this Encounter. Allergies   Allergen Reactions    Crestor [Rosuvastatin]      Muscle aches    Tape Sanna Spindle Tape] Rash     Blisters and red     Prior to Visit Medications    Medication Sig Taking? Authorizing Provider   clopidogrel (PLAVIX) 75 MG tablet TAKE 1 TABLET BY MOUTH ONCE DAILY Yes Kyle Bullock MD   SYNTHROID 25 MCG tablet Take 1 tablet by mouth daily Take with water on an empty stomach- wait 30 minutes before eating or taking other meds. Yes Lawrnce Phoenix, APRN - CNP   atenolol (TENORMIN) 25 MG tablet TAKE 1 TABLET BY MOUTH ONCE DAILY Yes Silas Mitchell PA-C   valsartan-hydrochlorothiazide (DIOVAN-HCT) 160-12.5 MG per tablet Take 1 tablet by mouth daily Yes Kyle Bullock MD   sildenafil (REVATIO) 20 MG tablet Use as directed. Yes Denae Phillips MD   Omeprazole (PRILOSEC PO) Take by mouth daily Yes Historical Provider, MD   Multiple Vitamins-Minerals (THERAPEUTIC MULTIVITAMIN-MINERALS) tablet Take 1 tablet by mouth daily Yes Historical Provider, MD   Ascorbic Acid (VITAMIN C PO) Take 1,000 mg by mouth Yes Historical Provider, MD   aspirin 81 MG EC tablet Take 1 tablet by mouth daily Yes Warren Mitchell PA-C   nitroGLYCERIN (NITROSTAT) 0.4 MG SL tablet DISSOLVE ONE TABLET UNDER THE TONGUE EVERY 5 MINUTES AS NEEDED FOR CHEST PAIN. DO NOT EXCEED A TOTAL OF 3 DOSES IN 15 MINUTES Yes Ugo Munroe MD   Coenzyme Q10 100 MG CAPS Take 100 mg by mouth daily.    Yes Historical Provider, MD     Past Medical History:   Diagnosis Date    Acquired hypothyroidism 2/21/2019    CAD (coronary artery disease)     Cancer ([facility] [address]     SKIN    DDD (degenerative disc disease), cervical 4/10/2017    GERD (gastroesophageal reflux disease)     Hyperlipidemia     Hypertension      Past Surgical History:   Procedure Laterality Date    CARDIAC CATHETERIZATION  08/20/2004    EF 60%    CARDIAC CATHETERIZATION  03/01/2003    EF 60%    CARDIOVASCULAR STRESS TEST  02/25/2010    EF 63%    CARDIOVASCULAR STRESS TEST  11/17/2006    EF 68%    CARDIOVASCULAR STRESS TEST  08/18/2005    EF 48%    CARDIOVASCULAR STRESS TEST  07/27/2004    EF 63%    CARDIOVASCULAR STRESS TEST  03/18/2003    EF 43%    DIAGNOSTIC CARDIAC CATH LAB PROCEDURE  08/18/2000    EF 70%    PTCA  07/19/2006    CYPHER STENT TO MID RCA    TRANSTHORACIC ECHOCARDIOGRAM  02/25/2010    EF 55-65%     Family History   Problem Relation Age of Onset    High Blood Pressure Mother     High Blood Pressure Father     Diabetes Sister     Heart Disease Maternal Grandmother     High Blood Pressure Maternal Grandmother     High Blood Pressure Maternal Grandfather     High Blood Pressure Paternal Grandmother     High Blood Pressure Paternal Grandfather     Diabetes Brother        CareTeam (Including outside providers/suppliers regularly involved in providing care):   Patient Care Team:  Roby Catalan MD as PCP - General (Family Medicine)  Roby Catalan MD as PCP - [facility] Empaneled Provider    Wt Readings from Last 3 Encounters:   06/19/19 211 lb 14.4 oz (96.1 kg)   05/15/19 207 lb 12.8 oz (94.3 kg)   12/19/18 215 lb 1.6 oz (97.6 kg)     Vitals:    06/19/19 1016   BP: 116/78   Site: Right Upper Arm   Position: Sitting   Cuff Size: Medium Adult   Pulse: 57   Resp: 16   SpO2: 98%   Weight: 211 lb 14.4 oz (96.1 kg)   Height: 5' 8.9\" (1.75 m)     Body mass index is 31.38 kg/m². Based upon direct observation of the patient, evaluation of cognition reveals recent and remote memory intact. Patient's complete Health Risk Assessment and screening values have been reviewed and are found in Flowsheets. The following problems were reviewed today and where indicated follow up appointments were made and/or referrals ordered.     Positive Risk Factor Screenings with Interventions:     Health Habits/Nutrition:  Health Habits/Nutrition  Do you exercise for at least 20 minutes 2-3 times per week?: (!) No  Have you lost any weight without trying in the past 3 months?: No  Do you eat fewer than 2 meals per day?: No  Have you seen a dentist within the past year?: Yes  Body mass index is 31.38 kg/m².   Health Habits/Nutrition Interventions:  · Inadequate physical activity:  patient is not ready to increase his/her physical activity level at this time    Hearing/Vision:  Hearing/Vision  Do you or your family notice any trouble with your hearing?: No  Do you have difficulty driving, watching TV, or doing any of your daily activities because of your eyesight?: No  Have you had an eye exam within the past year?: (!) No  Hearing/Vision Interventions: no  ·     Safety:  Safety  Do you have working smoke detectors?: Yes  Have all throw rugs been removed or fastened?: Yes  Do you have non-slip mats in all bathtubs?: (!) No  Do all of your stairways have a railing or banister?: Yes  Are your doorways, halls and stairs free of clutter?: Yes  Do you always fasten your seatbelt when you are in a car?: Yes  Safety Interventions:no  ·     Personalized Preventive Plan   Current Health Maintenance Status  Immunization History   Administered Date(s) Administered    Influenza Virus Vaccine 10/10/2017    Influenza, High Dose (Fluzone 65 yrs and older) 10/07/2018    Pneumococcal Conjugate 13-valent (Laverda Balder) 10/10/2017    Pneumococcal Polysaccharide ([id number]) 10/14/2016        Health Maintenance   Topic Date Due    AAA screen  1950    Hepatitis C screen  1950    DTaP/Tdap/Td vaccine (1 - Tdap) 03/28/1970    Diabetes screen  03/28/1991    Shingles Vaccine (1 of 2) 03/28/2001    Colon cancer screen colonoscopy  03/28/2001    Annual Wellness Visit (AWV)  03/28/2014    Potassium monitoring  12/14/2018    Creatinine monitoring  12/14/2018    TSH testing  04/04/2020    Lipid screen  12/14/2022    Flu vaccine  Completed    Pneumococcal 65+ years Vaccine  Completed     Recommendations for Preventive Services Due: see orders and patient instructions/AVS.  .   Recommended screening schedule for the next 5-10 years is provided to the patient in written form: see Patient Instructions/AVS.

## 2019-06-19 NOTE — PATIENT INSTRUCTIONS
Get labs this week. Start urology referral for prostate and bladder evaluation. Keep next regular appointment. You may receive a survey regarding the care you received during your visit. Your input is valuable to us. We encourage you to complete and return your survey. We hope you will choose us in the future for your healthcare needs. Personalized Preventive Plan for Kari Payne - 6/19/2019  Medicare offers a range of preventive health benefits. Some of the tests and screenings are paid in full while other may be subject to a deductible, co-insurance, and/or copay. Some of these benefits include a comprehensive review of your medical history including lifestyle, illnesses that may run in your family, and various assessments and screenings as appropriate. After reviewing your medical record and screening and assessments performed today your provider may have ordered immunizations, labs, imaging, and/or referrals for you. A list of these orders (if applicable) as well as your Preventive Care list are included within your After Visit Summary for your review. Other Preventive Recommendations:    · A preventive eye exam performed by an eye specialist is recommended every 1-2 years to screen for glaucoma; cataracts, macular degeneration, and other eye disorders. · A preventive dental visit is recommended every 6 months. · Try to get at least 150 minutes of exercise per week or 10,000 steps per day on a pedometer . · Order or download the FREE \"Exercise & Physical Activity: Your Everyday Guide\" from [employer]. Call 8-717.963.4365 or search [employer] online. · You need 8760-8358 mg of calcium and 0702-7721 IU of vitamin D per day.  It is possible to meet your calcium requirement with diet alone, but a vitamin D supplement is usually necessary to meet this goal.  · When exposed to the sun, use a sunscreen that protects against both UVA and UVB radiation with an SPF of 30 or greater. Reapply every 2 to 3 hours or after sweating, drying off with a towel, or swimming. · Always wear a seat belt when traveling in a car. Always wear a helmet when riding a bicycle or motorcycle. Personalized Preventive Plan for Lenny Riley - 6/19/2019  Medicare offers a range of preventive health benefits. Some of the tests and screenings are paid in full while other may be subject to a deductible, co-insurance, and/or copay. Some of these benefits include a comprehensive review of your medical history including lifestyle, illnesses that may run in your family, and various assessments and screenings as appropriate. After reviewing your medical record and screening and assessments performed today your provider may have ordered immunizations, labs, imaging, and/or referrals for you. A list of these orders (if applicable) as well as your Preventive Care list are included within your After Visit Summary for your review. Other Preventive Recommendations:    · A preventive eye exam performed by an eye specialist is recommended every 1-2 years to screen for glaucoma; cataracts, macular degeneration, and other eye disorders. · A preventive dental visit is recommended every 6 months. · Try to get at least 150 minutes of exercise per week or 10,000 steps per day on a pedometer . · Order or download the FREE \"Exercise & Physical Activity: Your Everyday Guide\" from [employer]. Call 4-681.745.4595 or search [employer] online. · You need 9743-5412 mg of calcium and 8133-2849 IU of vitamin D per day. It is possible to meet your calcium requirement with diet alone, but a vitamin D supplement is usually necessary to meet this goal.  · When exposed to the sun, use a sunscreen that protects against both UVA and UVB radiation with an SPF of 30 or greater.  Reapply every 2 to 3 hours or after sweating, drying off with a towel, or swimming. · Always wear a seat belt when traveling in a car. Always wear a helmet when riding a bicycle or motorcycle.

## 2019-06-19 NOTE — PROGRESS NOTES
Subjective:      Patient ID: Lorenzo Valencia is a 77 y.o. male. HPI  Encounter Diagnoses   Name Primary?  Routine general medical examination at a health care facility     Urinary frequency Yes    OAB (overactive bladder)     BPH associated with nocturia     Coronary artery disease involving native coronary artery of native heart with unstable angina pectoris ([facility] [address]     Essential hypertension      Patient is here for his annual Medicare wellness exam and evaluation of persisting neurologic symptoms. The urinary frequency that he has had and symptoms suggestive of urgency are still ongoing even after having been on antibiotics. He denies dysuria, hematuria, and pyuria. He has a previous diagnosis of BPH but has been asymptomatic up to this point. He denies fever chills and sweats. Because of the persistence of the symptoms, he will be referred to urology with some testing done prior to that appointment. Otherwise, his cardiac status remains stable and he has had no changes in cardiac medications as he follows up with his cardiologist on a regular basis. The rest of this patient's conditions are stable. Past medical and surgical hx reviewed.   Past Medical History:   Diagnosis Date    Acquired hypothyroidism 2/21/2019    CAD (coronary artery disease)     Cancer ([facility] [address]     SKIN    DDD (degenerative disc disease), cervical 4/10/2017    GERD (gastroesophageal reflux disease)     Hyperlipidemia     Hypertension      Past Surgical History:   Procedure Laterality Date    CARDIAC CATHETERIZATION  08/20/2004    EF 60%    CARDIAC CATHETERIZATION  03/01/2003    EF 60%    CARDIOVASCULAR STRESS TEST  02/25/2010    EF 63%    CARDIOVASCULAR STRESS TEST  11/17/2006    EF 68%    CARDIOVASCULAR STRESS TEST  08/18/2005    EF 48%    CARDIOVASCULAR STRESS TEST  07/27/2004    EF 63%    CARDIOVASCULAR STRESS TEST  03/18/2003    EF 43%    DIAGNOSTIC CARDIAC CATH LAB PROCEDURE  08/18/2000    EF 70%    PTCA  07/19/2006    CYPHER STENT TO MID RCA    TRANSTHORACIC ECHOCARDIOGRAM  02/25/2010    EF 55-65%     Portions of this note were completed with a voice recording program.  Efforts were made to edit the dictations but occasionally words are mis-transcribed. Review of Systems   Constitutional: Negative. HENT: Negative. Respiratory: Negative. Negative for shortness of breath. Cardiovascular: Negative. Negative for chest pain, palpitations and leg swelling. Gastrointestinal: Negative. Genitourinary: Positive for frequency and urgency. Negative for difficulty urinating, dysuria and hematuria. Musculoskeletal: Negative. Skin: Negative. Neurological: Negative. Hematological: Negative. Psychiatric/Behavioral: Negative. All other systems reviewed and are negative. Objective:   Physical Exam   Constitutional: He is oriented to person, place, and time. He appears well-developed and well-nourished. Cardiovascular: Normal rate, regular rhythm and normal heart sounds. Pulmonary/Chest: Effort normal and breath sounds normal.   Abdominal: Soft. Bowel sounds are normal.   Genitourinary: Rectum normal, testes normal and penis normal. Prostate is enlarged. Prostate is not tender. Circumcised. Musculoskeletal: He exhibits no edema. Neurological: He is alert and oriented to person, place, and time. Skin: Skin is warm and dry. Psychiatric: He has a normal mood and affect. Nursing note and vitals reviewed. Assessment:       Diagnosis Orders   1. Urinary frequency  PSA Prostatic Specific Antigen    CBC With Auto Differential    Urinalysis Reflex to Culture    [facility] MEI Soto MD, Urology, [place] [address]   2. Routine general medical examination at a health care facility     3. OAB (overactive bladder)  PSA Prostatic Specific Antigen    CBC With Auto Differential    Urinalysis Reflex to Culture    [facility] CRISTIANO Soto MD, Urology, [place]   4.  BPH associated with nocturia PSA Prostatic Specific Antigen    CBC With Auto Differential    Urinalysis Reflex to Culture    [facility], MD, Urology, [address] [place] [address]   5. Coronary artery disease involving native coronary artery of native heart with unstable angina pectoris ([facility] [address]     6. Essential hypertension             Plan:      Orders Placed This Encounter   Procedures    PSA Prostatic Specific Antigen     Standing Status:   Future     Number of Occurrences:   1     Standing Expiration Date:   6/19/2020    CBC With Auto Differential     Standing Status:   Future     Number of Occurrences:   1     Standing Expiration Date:   6/19/2020    Urinalysis Reflex to Culture     Standing Status:   Future     Number of Occurrences:   1     Standing Expiration Date:   6/18/2020     Order Specific Question:   SPECIFY(EX-CATH,MIDSTREAM,CYSTO,ETC)? Answer:   Juliet Austin MD, Urology, [address] [place] [address]     Referral Priority:   Routine     Referral Type:   Eval and Treat     Referral Reason:   Specialty Services Required     Referred to Provider:   Tino Gongora MD     Requested Specialty:   Urology     Number of Visits Requested:   1     There are no discontinued medications. Current Outpatient Medications   Medication Sig Dispense Refill    clopidogrel (PLAVIX) 75 MG tablet TAKE 1 TABLET BY MOUTH ONCE DAILY 90 tablet 3    SYNTHROID 25 MCG tablet Take 1 tablet by mouth daily Take with water on an empty stomach- wait 30 minutes before eating or taking other meds. 90 tablet 3    atenolol (TENORMIN) 25 MG tablet TAKE 1 TABLET BY MOUTH ONCE DAILY 90 tablet 3    valsartan-hydrochlorothiazide (DIOVAN-HCT) 160-12.5 MG per tablet Take 1 tablet by mouth daily 90 tablet 3    sildenafil (REVATIO) 20 MG tablet Use as directed.  30 tablet 3    Omeprazole (PRILOSEC PO) Take by mouth daily      Multiple Vitamins-Minerals (THERAPEUTIC MULTIVITAMIN-MINERALS) tablet Take 1 tablet by mouth daily      Ascorbic Acid (VITAMIN C PO) Take 1,000 mg by mouth      aspirin 81 MG EC tablet Take 1 tablet by mouth daily 30 tablet 3    nitroGLYCERIN (NITROSTAT) 0.4 MG SL tablet DISSOLVE ONE TABLET UNDER THE TONGUE EVERY 5 MINUTES AS NEEDED FOR CHEST PAIN. DO NOT EXCEED A TOTAL OF 3 DOSES IN 15 MINUTES 25 tablet 3    Coenzyme Q10 100 MG CAPS Take 100 mg by mouth daily. No current facility-administered medications for this visit. Get labs this week. Start urology referral for prostate and bladder evaluation. Keep next regular appointment. Discussed use, benefit, and side effects of prescribed medications. Barriers to compliance discussed. All patient questions answered. Pt voiced understanding. Swpana Fraser MD   Medicare Annual Wellness Visit  Name: Chinmay Martines Date: 2019   MRN: [id number] Sex: Male   Age: 77 y.o. Ethnicity: Non-/Non    : 1950 Race: Linus Vidal is here for Medicare AWV    Screenings for behavioral, psychosocial and functional/safety risks, and cognitive dysfunction are all negative except as indicated below. These results, as well as other patient data from the [address] [facility] [address] form, are documented in Flowsheets linked to this Encounter. Allergies   Allergen Reactions    Crestor [Rosuvastatin]      Muscle aches    Tape [place]ools Tape] Rash     Blisters and red       Prior to Visit Medications    Medication Sig Taking? Authorizing Provider   clopidogrel (PLAVIX) 75 MG tablet TAKE 1 TABLET BY MOUTH ONCE DAILY Yes Bob Stanley MD   SYNTHROID 25 MCG tablet Take 1 tablet by mouth daily Take with water on an empty stomach- wait 30 minutes before eating or taking other meds.  Yes BASHIR Lynn - CNP   atenolol (TENORMIN) 25 MG tablet TAKE 1 TABLET BY MOUTH ONCE DAILY Yes Silas Mitchell PA-C   valsartan-hydrochlorothiazide (DIOVAN-HCT) 160-12.5 MG per tablet Take 1 tablet by mouth daily Yes Bob Stanley MD   sildenafil (REVATIO) 20 MG tablet Use as directed. Yes Sirena Lugo MD   Omeprazole (PRILOSEC PO) Take by mouth daily Yes Historical Provider, MD   Multiple Vitamins-Minerals (THERAPEUTIC MULTIVITAMIN-MINERALS) tablet Take 1 tablet by mouth daily Yes Historical Provider, MD   Ascorbic Acid (VITAMIN C PO) Take 1,000 mg by mouth Yes Historical Provider, MD   aspirin 81 MG EC tablet Take 1 tablet by mouth daily Yes Ceferino Mitchell PA-C   nitroGLYCERIN (NITROSTAT) 0.4 MG SL tablet DISSOLVE ONE TABLET UNDER THE TONGUE EVERY 5 MINUTES AS NEEDED FOR CHEST PAIN. DO NOT EXCEED A TOTAL OF 3 DOSES IN 15 MINUTES Yes Ugo Munroe MD   Coenzyme Q10 100 MG CAPS Take 100 mg by mouth daily.    Yes Historical Provider, MD       Past Medical History:   Diagnosis Date    Acquired hypothyroidism 2/21/2019    CAD (coronary artery disease)     Cancer ([facility] [address]     SKIN    DDD (degenerative disc disease), cervical 4/10/2017    GERD (gastroesophageal reflux disease)     Hyperlipidemia     Hypertension      Past Surgical History:   Procedure Laterality Date    CARDIAC CATHETERIZATION  08/20/2004    EF 60%    CARDIAC CATHETERIZATION  03/01/2003    EF 60%    CARDIOVASCULAR STRESS TEST  02/25/2010    EF 63%    CARDIOVASCULAR STRESS TEST  11/17/2006    EF 68%    CARDIOVASCULAR STRESS TEST  08/18/2005    EF 48%    CARDIOVASCULAR STRESS TEST  07/27/2004    EF 63%    CARDIOVASCULAR STRESS TEST  03/18/2003    EF 43%    DIAGNOSTIC CARDIAC CATH LAB PROCEDURE  08/18/2000    EF 70%    PTCA  07/19/2006    CYPHER STENT TO MID RCA    TRANSTHORACIC ECHOCARDIOGRAM  02/25/2010    EF 55-65%       Family History   Problem Relation Age of Onset    High Blood Pressure Mother     High Blood Pressure Father     Diabetes Sister     Heart Disease Maternal Grandmother     High Blood Pressure Maternal Grandmother     High Blood Pressure Maternal Grandfather     High Blood Pressure Paternal Grandmother     High Blood Pressure Paternal Grandfather     Diabetes values have been reviewed and are found in Flowsheets. The following problems were reviewed today and where indicated follow up appointments were made and/or referrals ordered. Positive Risk Factor Screenings with Interventions:     Health Habits/Nutrition:  Health Habits/Nutrition  Do you exercise for at least 20 minutes 2-3 times per week?: (!) No  Have you lost any weight without trying in the past 3 months?: No  Do you eat fewer than 2 meals per day?: No  Have you seen a dentist within the past year?: Yes  Body mass index is 31.38 kg/m².   Health Habits/Nutrition Interventions:  · none    Hearing/Vision:  Hearing/Vision  Do you or your family notice any trouble with your hearing?: No  Do you have difficulty driving, watching TV, or doing any of your daily activities because of your eyesight?: No  Have you had an eye exam within the past year?: (!) No  Hearing/Vision Interventions:  · none    Safety:  Safety  Do you have working smoke detectors?: Yes  Have all throw rugs been removed or fastened?: Yes  Do you have non-slip mats in all bathtubs?: (!) No  Do all of your stairways have a railing or banister?: Yes  Are your doorways, halls and stairs free of clutter?: Yes  Do you always fasten your seatbelt when you are in a car?: Yes  Safety Interventions:  · none    Personalized Preventive Plan   Current Health Maintenance Status  Immunization History   Administered Date(s) Administered    Influenza Virus Vaccine 10/10/2017    Influenza, High Dose (Fluzone 65 yrs and older) 10/07/2018    Pneumococcal Conjugate 13-valent (Tiffany Habermann) 10/10/2017    Pneumococcal Polysaccharide ([id number]) 10/14/2016        Health Maintenance   Topic Date Due    AAA screen  1950    Hepatitis C screen  1950    DTaP/Tdap/Td vaccine (1 - Tdap) 03/28/1970    Diabetes screen  03/28/1991    Shingles Vaccine (1 of 2) 03/28/2001    Colon cancer screen colonoscopy  03/28/2001    Annual Wellness Visit (AWV)  03/28/2014  Potassium monitoring  12/14/2018    Creatinine monitoring  12/14/2018    TSH testing  04/04/2020    Lipid screen  12/14/2022    Flu vaccine  Completed    Pneumococcal 65+ years Vaccine  Completed     Recommendations for Preventive Services Due: see orders and patient instructions/AVS.  .   Recommended screening schedule for the next 5-10 years is provided to the patient in written form: see Patient Instructions/AVS.

## 2019-06-20 DIAGNOSIS — N40.1 BPH ASSOCIATED WITH NOCTURIA: Primary | ICD-10-CM

## 2019-06-20 DIAGNOSIS — R35.1 BPH ASSOCIATED WITH NOCTURIA: Primary | ICD-10-CM

## 2019-06-20 RX ORDER — SOLIFENACIN SUCCINATE 10 MG/1
10 TABLET, FILM COATED ORAL DAILY
Qty: 30 TABLET | Refills: 1 | Status: SHIPPED | OUTPATIENT
Start: 2019-06-20 | End: 2019-08-01

## 2019-07-02 ENCOUNTER — OFFICE VISIT (OUTPATIENT)
Dept: UROLOGY | Age: 69
End: 2019-07-02
Payer: MEDICARE

## 2019-07-02 VITALS
DIASTOLIC BLOOD PRESSURE: 72 MMHG | WEIGHT: 213 LBS | BODY MASS INDEX: 30.49 KG/M2 | HEIGHT: 70 IN | SYSTOLIC BLOOD PRESSURE: 118 MMHG

## 2019-07-02 DIAGNOSIS — N50.811 TESTICULAR PAIN, RIGHT: ICD-10-CM

## 2019-07-02 DIAGNOSIS — N52.01 ERECTILE DYSFUNCTION DUE TO ARTERIAL INSUFFICIENCY: ICD-10-CM

## 2019-07-02 DIAGNOSIS — N13.8 HYPERTROPHY OF PROSTATE WITH URINARY OBSTRUCTION: Primary | ICD-10-CM

## 2019-07-02 DIAGNOSIS — N40.1 HYPERTROPHY OF PROSTATE WITH URINARY OBSTRUCTION: Primary | ICD-10-CM

## 2019-07-02 LAB
BILIRUBIN URINE: NEGATIVE
BLOOD URINE, POC: NEGATIVE
CHARACTER, URINE: CLEAR
COLOR, URINE: YELLOW
GLUCOSE URINE: NEGATIVE MG/DL
KETONES, URINE: NEGATIVE
LEUKOCYTE CLUMPS, URINE: NEGATIVE
NITRITE, URINE: NEGATIVE
PH, URINE: 6 (ref 5–9)
POST VOID RESIDUAL (PVR): 50 ML
PROTEIN, URINE: NEGATIVE MG/DL
SPECIFIC GRAVITY, URINE: 1.01 (ref 1–1.03)
UROBILINOGEN, URINE: 0.2 EU/DL (ref 0–1)

## 2019-07-02 PROCEDURE — 51798 US URINE CAPACITY MEASURE: CPT | Performed by: UROLOGY

## 2019-07-02 PROCEDURE — G8417 CALC BMI ABV UP PARAM F/U: HCPCS | Performed by: UROLOGY

## 2019-07-02 PROCEDURE — 81003 URINALYSIS AUTO W/O SCOPE: CPT | Performed by: UROLOGY

## 2019-07-02 PROCEDURE — 1036F TOBACCO NON-USER: CPT | Performed by: UROLOGY

## 2019-07-02 PROCEDURE — 3017F COLORECTAL CA SCREEN DOC REV: CPT | Performed by: UROLOGY

## 2019-07-02 PROCEDURE — G8598 ASA/ANTIPLAT THER USED: HCPCS | Performed by: UROLOGY

## 2019-07-02 PROCEDURE — 99204 OFFICE O/P NEW MOD 45 MIN: CPT | Performed by: UROLOGY

## 2019-07-02 PROCEDURE — G8427 DOCREV CUR MEDS BY ELIG CLIN: HCPCS | Performed by: UROLOGY

## 2019-07-02 PROCEDURE — 4040F PNEUMOC VAC/ADMIN/RCVD: CPT | Performed by: UROLOGY

## 2019-07-02 PROCEDURE — 1123F ACP DISCUSS/DSCN MKR DOCD: CPT | Performed by: UROLOGY

## 2019-07-02 RX ORDER — TAMSULOSIN HYDROCHLORIDE 0.4 MG/1
0.4 CAPSULE ORAL NIGHTLY
Qty: 30 CAPSULE | Refills: 5 | Status: SHIPPED | OUTPATIENT
Start: 2019-07-02 | End: 2019-12-19 | Stop reason: SDUPTHER

## 2019-07-02 ASSESSMENT — ENCOUNTER SYMPTOMS
DIARRHEA: 0
COLOR CHANGE: 0
BACK PAIN: 0
EYE ITCHING: 0
CHEST TIGHTNESS: 0
NAUSEA: 0
SHORTNESS OF BREATH: 0
EYE PAIN: 0

## 2019-08-01 ENCOUNTER — OFFICE VISIT (OUTPATIENT)
Dept: UROLOGY | Age: 69
End: 2019-08-01
Payer: MEDICARE

## 2019-08-01 VITALS
SYSTOLIC BLOOD PRESSURE: 134 MMHG | WEIGHT: 214.7 LBS | HEIGHT: 70 IN | DIASTOLIC BLOOD PRESSURE: 68 MMHG | BODY MASS INDEX: 30.74 KG/M2

## 2019-08-01 DIAGNOSIS — N40.1 HYPERTROPHY OF PROSTATE WITH URINARY OBSTRUCTION: Primary | ICD-10-CM

## 2019-08-01 DIAGNOSIS — N13.8 HYPERTROPHY OF PROSTATE WITH URINARY OBSTRUCTION: Primary | ICD-10-CM

## 2019-08-01 LAB — POST VOID RESIDUAL (PVR): 50 ML

## 2019-08-01 PROCEDURE — G8428 CUR MEDS NOT DOCUMENT: HCPCS | Performed by: UROLOGY

## 2019-08-01 PROCEDURE — 51798 US URINE CAPACITY MEASURE: CPT | Performed by: UROLOGY

## 2019-08-01 PROCEDURE — 1123F ACP DISCUSS/DSCN MKR DOCD: CPT | Performed by: UROLOGY

## 2019-08-01 PROCEDURE — 1036F TOBACCO NON-USER: CPT | Performed by: UROLOGY

## 2019-08-01 PROCEDURE — 4040F PNEUMOC VAC/ADMIN/RCVD: CPT | Performed by: UROLOGY

## 2019-08-01 PROCEDURE — 3017F COLORECTAL CA SCREEN DOC REV: CPT | Performed by: UROLOGY

## 2019-08-01 PROCEDURE — 99213 OFFICE O/P EST LOW 20 MIN: CPT | Performed by: UROLOGY

## 2019-08-01 PROCEDURE — G8417 CALC BMI ABV UP PARAM F/U: HCPCS | Performed by: UROLOGY

## 2019-08-01 PROCEDURE — G8598 ASA/ANTIPLAT THER USED: HCPCS | Performed by: UROLOGY

## 2019-08-16 ENCOUNTER — TELEPHONE (OUTPATIENT)
Dept: CARDIOLOGY CLINIC | Age: 69
End: 2019-08-16

## 2019-08-29 ENCOUNTER — TELEPHONE (OUTPATIENT)
Dept: FAMILY MEDICINE CLINIC | Age: 69
End: 2019-08-29

## 2019-08-29 DIAGNOSIS — I10 ESSENTIAL HYPERTENSION: Primary | ICD-10-CM

## 2019-08-29 DIAGNOSIS — I73.9 CLAUDICATION OF CALF MUSCLES (HCC): Primary | ICD-10-CM

## 2019-08-29 NOTE — TELEPHONE ENCOUNTER
Patient notified and understanding voiced. He will call [facility] to schedule. Aware they have order for CTA and BMP in the chart.

## 2019-08-29 NOTE — TELEPHONE ENCOUNTER
These are symptoms of claudication due to peripheral vascular disease. Orders for CTA with contrast of both lower extremities is in epic.

## 2019-08-29 NOTE — TELEPHONE ENCOUNTER
Patient c/o leg pain/calf pain bilateral with walking. Usually walk the reservoir and \"calves screaming\" while walking. Denies calf pain any other time. Requesting to get this checked out.   Please advise

## 2019-08-30 ENCOUNTER — TELEPHONE (OUTPATIENT)
Dept: FAMILY MEDICINE CLINIC | Age: 69
End: 2019-08-30

## 2019-08-30 DIAGNOSIS — I73.9 CLAUDICATION OF CALF MUSCLES (HCC): Primary | ICD-10-CM

## 2019-08-30 DIAGNOSIS — I25.110 CORONARY ARTERY DISEASE INVOLVING NATIVE CORONARY ARTERY OF NATIVE HEART WITH UNSTABLE ANGINA PECTORIS (HCC): ICD-10-CM

## 2019-08-30 NOTE — TELEPHONE ENCOUNTER
CTA of the abdominal aorta with bilateral runoff is in epic. Cancel the previous CTA orders of the abdomen and individual lower extremities.

## 2019-09-11 RX ORDER — NITROGLYCERIN 0.4 MG/1
TABLET SUBLINGUAL
Qty: 25 TABLET | Refills: 3 | Status: ON HOLD | OUTPATIENT
Start: 2019-09-11 | End: 2019-10-10 | Stop reason: HOSPADM

## 2019-09-12 ENCOUNTER — HOSPITAL ENCOUNTER (OUTPATIENT)
Dept: CT IMAGING | Age: 69
Discharge: HOME | End: 2019-09-12
Payer: MEDICARE

## 2019-09-12 DIAGNOSIS — I73.9 CLAUDICATION OF CALF MUSCLES (HCC): ICD-10-CM

## 2019-09-12 LAB — POC CREATININE WHOLE BLOOD: 1.3 MG/DL (ref 0.5–1.2)

## 2019-09-12 PROCEDURE — 82565 ASSAY OF CREATININE: CPT

## 2019-09-12 PROCEDURE — 75635 CT ANGIO ABDOMINAL ARTERIES: CPT

## 2019-09-12 PROCEDURE — 6360000004 HC RX CONTRAST MEDICATION: Performed by: FAMILY MEDICINE

## 2019-09-12 RX ADMIN — IOPAMIDOL 100 ML: 755 INJECTION, SOLUTION INTRAVENOUS at 14:34

## 2019-09-12 NOTE — RESULT ENCOUNTER NOTE
Jordy Claudio has significant peripheral artery disease in both lower extremities accounting for his vascular claudication. I recommend referral to vascular surgery for further evaluation and treatment. I will refer to whomever his preferences.

## 2019-09-13 DIAGNOSIS — I73.9 CLAUDICATION OF CALF MUSCLES (HCC): ICD-10-CM

## 2019-09-13 DIAGNOSIS — I73.9 PAD (PERIPHERAL ARTERY DISEASE) (HCC): Primary | ICD-10-CM

## 2019-10-07 ENCOUNTER — OFFICE VISIT (OUTPATIENT)
Dept: CARDIOTHORACIC SURGERY | Age: 69
End: 2019-10-07
Payer: MEDICARE

## 2019-10-07 VITALS
HEART RATE: 62 BPM | BODY MASS INDEX: 31.96 KG/M2 | HEIGHT: 69 IN | WEIGHT: 215.8 LBS | DIASTOLIC BLOOD PRESSURE: 70 MMHG | SYSTOLIC BLOOD PRESSURE: 127 MMHG

## 2019-10-07 DIAGNOSIS — I73.9 PVD (PERIPHERAL VASCULAR DISEASE) WITH CLAUDICATION (HCC): Primary | ICD-10-CM

## 2019-10-07 PROCEDURE — 4040F PNEUMOC VAC/ADMIN/RCVD: CPT | Performed by: THORACIC SURGERY (CARDIOTHORACIC VASCULAR SURGERY)

## 2019-10-07 PROCEDURE — 3017F COLORECTAL CA SCREEN DOC REV: CPT | Performed by: THORACIC SURGERY (CARDIOTHORACIC VASCULAR SURGERY)

## 2019-10-07 PROCEDURE — G8417 CALC BMI ABV UP PARAM F/U: HCPCS | Performed by: THORACIC SURGERY (CARDIOTHORACIC VASCULAR SURGERY)

## 2019-10-07 PROCEDURE — G8427 DOCREV CUR MEDS BY ELIG CLIN: HCPCS | Performed by: THORACIC SURGERY (CARDIOTHORACIC VASCULAR SURGERY)

## 2019-10-07 PROCEDURE — G8598 ASA/ANTIPLAT THER USED: HCPCS | Performed by: THORACIC SURGERY (CARDIOTHORACIC VASCULAR SURGERY)

## 2019-10-07 PROCEDURE — 1123F ACP DISCUSS/DSCN MKR DOCD: CPT | Performed by: THORACIC SURGERY (CARDIOTHORACIC VASCULAR SURGERY)

## 2019-10-07 PROCEDURE — 1036F TOBACCO NON-USER: CPT | Performed by: THORACIC SURGERY (CARDIOTHORACIC VASCULAR SURGERY)

## 2019-10-07 PROCEDURE — 99202 OFFICE O/P NEW SF 15 MIN: CPT | Performed by: THORACIC SURGERY (CARDIOTHORACIC VASCULAR SURGERY)

## 2019-10-07 PROCEDURE — G8484 FLU IMMUNIZE NO ADMIN: HCPCS | Performed by: THORACIC SURGERY (CARDIOTHORACIC VASCULAR SURGERY)

## 2019-10-09 ENCOUNTER — APPOINTMENT (OUTPATIENT)
Dept: GENERAL RADIOLOGY | Age: 69
DRG: 310 | End: 2019-10-09
Payer: MEDICARE

## 2019-10-09 ENCOUNTER — HOSPITAL ENCOUNTER (INPATIENT)
Age: 69
LOS: 1 days | Discharge: HOME | DRG: 310 | End: 2019-10-10
Attending: EMERGENCY MEDICINE | Admitting: INTERNAL MEDICINE
Payer: MEDICARE

## 2019-10-09 DIAGNOSIS — I48.91 ATRIAL FIBRILLATION WITH RVR (HCC): Primary | ICD-10-CM

## 2019-10-09 PROBLEM — I48.92 ATRIAL FLUTTER (HCC): Status: ACTIVE | Noted: 2019-10-09

## 2019-10-09 LAB
ANION GAP SERPL CALCULATED.3IONS-SCNC: 17 MEQ/L (ref 8–16)
APTT: 36.4 SECONDS (ref 22–38)
BASOPHILS # BLD: 1 %
BASOPHILS ABSOLUTE: 0.1 THOU/MM3 (ref 0–0.1)
BUN BLDV-MCNC: 14 MG/DL (ref 7–22)
CALCIUM SERPL-MCNC: 9.7 MG/DL (ref 8.5–10.5)
CHLORIDE BLD-SCNC: 100 MEQ/L (ref 98–111)
CO2: 25 MEQ/L (ref 23–33)
CREAT SERPL-MCNC: 1.1 MG/DL (ref 0.4–1.2)
EKG ATRIAL RATE: 268 BPM
EKG P AXIS: -107 DEGREES
EKG Q-T INTERVAL: 366 MS
EKG QRS DURATION: 76 MS
EKG QTC CALCULATION (BAZETT): 546 MS
EKG R AXIS: -36 DEGREES
EKG T AXIS: -60 DEGREES
EKG VENTRICULAR RATE: 134 BPM
EOSINOPHIL # BLD: 1.8 %
EOSINOPHILS ABSOLUTE: 0.1 THOU/MM3 (ref 0–0.4)
ERYTHROCYTE [DISTWIDTH] IN BLOOD BY AUTOMATED COUNT: 13.2 % (ref 11.5–14.5)
ERYTHROCYTE [DISTWIDTH] IN BLOOD BY AUTOMATED COUNT: 42.8 FL (ref 35–45)
GFR SERPL CREATININE-BSD FRML MDRD: 66 ML/MIN/1.73M2
GLUCOSE BLD-MCNC: 103 MG/DL (ref 70–108)
HCT VFR BLD CALC: 50.2 % (ref 42–52)
HEMOGLOBIN: 17.3 GM/DL (ref 14–18)
IMMATURE GRANS (ABS): 0.04 THOU/MM3 (ref 0–0.07)
IMMATURE GRANULOCYTES: 0.5 %
LYMPHOCYTES # BLD: 33.4 %
LYMPHOCYTES ABSOLUTE: 2.6 THOU/MM3 (ref 1–4.8)
MCH RBC QN AUTO: 30.8 PG (ref 26–33)
MCHC RBC AUTO-ENTMCNC: 34.5 GM/DL (ref 32.2–35.5)
MCV RBC AUTO: 89.3 FL (ref 80–94)
MONOCYTES # BLD: 10.2 %
MONOCYTES ABSOLUTE: 0.8 THOU/MM3 (ref 0.4–1.3)
NUCLEATED RED BLOOD CELLS: 0 /100 WBC
OSMOLALITY CALCULATION: 283.8 MOSMOL/KG (ref 275–300)
PLATELET # BLD: 165 THOU/MM3 (ref 130–400)
PMV BLD AUTO: 11 FL (ref 9.4–12.4)
POTASSIUM REFLEX MAGNESIUM: 4.2 MEQ/L (ref 3.5–5.2)
PRO-BNP: 160.8 PG/ML (ref 0–900)
RBC # BLD: 5.62 MILL/MM3 (ref 4.7–6.1)
SEG NEUTROPHILS: 53.1 %
SEGMENTED NEUTROPHILS ABSOLUTE COUNT: 4.2 THOU/MM3 (ref 1.8–7.7)
SODIUM BLD-SCNC: 142 MEQ/L (ref 135–145)
T4 FREE: 1.35 NG/DL (ref 0.93–1.76)
TROPONIN T: < 0.01 NG/ML
TROPONIN T: < 0.01 NG/ML
TSH SERPL DL<=0.05 MIU/L-ACNC: 4.6 UIU/ML (ref 0.4–4.2)
WBC # BLD: 7.9 THOU/MM3 (ref 4.8–10.8)

## 2019-10-09 PROCEDURE — 71045 X-RAY EXAM CHEST 1 VIEW: CPT

## 2019-10-09 PROCEDURE — 2500000003 HC RX 250 WO HCPCS: Performed by: EMERGENCY MEDICINE

## 2019-10-09 PROCEDURE — 94761 N-INVAS EAR/PLS OXIMETRY MLT: CPT

## 2019-10-09 PROCEDURE — 85025 COMPLETE CBC W/AUTO DIFF WBC: CPT

## 2019-10-09 PROCEDURE — 99285 EMERGENCY DEPT VISIT HI MDM: CPT

## 2019-10-09 PROCEDURE — 85730 THROMBOPLASTIN TIME PARTIAL: CPT

## 2019-10-09 PROCEDURE — 96365 THER/PROPH/DIAG IV INF INIT: CPT

## 2019-10-09 PROCEDURE — 93005 ELECTROCARDIOGRAM TRACING: CPT | Performed by: INTERNAL MEDICINE

## 2019-10-09 PROCEDURE — 2140000000 HC CCU INTERMEDIATE R&B

## 2019-10-09 PROCEDURE — 2709999900 HC NON-CHARGEABLE SUPPLY

## 2019-10-09 PROCEDURE — 83880 ASSAY OF NATRIURETIC PEPTIDE: CPT

## 2019-10-09 PROCEDURE — 84484 ASSAY OF TROPONIN QUANT: CPT

## 2019-10-09 PROCEDURE — 80048 BASIC METABOLIC PNL TOTAL CA: CPT

## 2019-10-09 PROCEDURE — 84439 ASSAY OF FREE THYROXINE: CPT

## 2019-10-09 PROCEDURE — 6360000002 HC RX W HCPCS: Performed by: EMERGENCY MEDICINE

## 2019-10-09 PROCEDURE — 93005 ELECTROCARDIOGRAM TRACING: CPT | Performed by: EMERGENCY MEDICINE

## 2019-10-09 PROCEDURE — 84443 ASSAY THYROID STIM HORMONE: CPT

## 2019-10-09 PROCEDURE — 6370000000 HC RX 637 (ALT 250 FOR IP): Performed by: INTERNAL MEDICINE

## 2019-10-09 PROCEDURE — 96376 TX/PRO/DX INJ SAME DRUG ADON: CPT

## 2019-10-09 PROCEDURE — 36415 COLL VENOUS BLD VENIPUNCTURE: CPT

## 2019-10-09 PROCEDURE — 99223 1ST HOSP IP/OBS HIGH 75: CPT | Performed by: INTERNAL MEDICINE

## 2019-10-09 PROCEDURE — 93010 ELECTROCARDIOGRAM REPORT: CPT | Performed by: NUCLEAR MEDICINE

## 2019-10-09 PROCEDURE — 2580000003 HC RX 258: Performed by: EMERGENCY MEDICINE

## 2019-10-09 RX ORDER — SODIUM CHLORIDE 0.9 % (FLUSH) 0.9 %
10 SYRINGE (ML) INJECTION PRN
Status: DISCONTINUED | OUTPATIENT
Start: 2019-10-09 | End: 2019-10-09 | Stop reason: SDUPTHER

## 2019-10-09 RX ORDER — HEPARIN SODIUM 1000 [USP'U]/ML
80 INJECTION, SOLUTION INTRAVENOUS; SUBCUTANEOUS ONCE
Status: COMPLETED | OUTPATIENT
Start: 2019-10-09 | End: 2019-10-09

## 2019-10-09 RX ORDER — SODIUM CHLORIDE 0.9 % (FLUSH) 0.9 %
10 SYRINGE (ML) INJECTION PRN
Status: DISCONTINUED | OUTPATIENT
Start: 2019-10-09 | End: 2019-10-10 | Stop reason: HOSPADM

## 2019-10-09 RX ORDER — HEPARIN SODIUM 10000 [USP'U]/100ML
18 INJECTION, SOLUTION INTRAVENOUS CONTINUOUS
Status: DISCONTINUED | OUTPATIENT
Start: 2019-10-09 | End: 2019-10-10

## 2019-10-09 RX ORDER — TAMSULOSIN HYDROCHLORIDE 0.4 MG/1
0.4 CAPSULE ORAL NIGHTLY
Status: DISCONTINUED | OUTPATIENT
Start: 2019-10-09 | End: 2019-10-10 | Stop reason: HOSPADM

## 2019-10-09 RX ORDER — OMEPRAZOLE 10 MG/1
10 CAPSULE, DELAYED RELEASE ORAL DAILY
Status: DISCONTINUED | OUTPATIENT
Start: 2019-10-10 | End: 2019-10-09 | Stop reason: CLARIF

## 2019-10-09 RX ORDER — ACETAMINOPHEN 500 MG
1000 TABLET ORAL NIGHTLY PRN
Status: DISCONTINUED | OUTPATIENT
Start: 2019-10-09 | End: 2019-10-10 | Stop reason: HOSPADM

## 2019-10-09 RX ORDER — ASPIRIN 81 MG/1
81 TABLET ORAL DAILY
Status: DISCONTINUED | OUTPATIENT
Start: 2019-10-10 | End: 2019-10-10

## 2019-10-09 RX ORDER — SODIUM CHLORIDE 0.9 % (FLUSH) 0.9 %
10 SYRINGE (ML) INJECTION EVERY 12 HOURS SCHEDULED
Status: DISCONTINUED | OUTPATIENT
Start: 2019-10-09 | End: 2019-10-09 | Stop reason: SDUPTHER

## 2019-10-09 RX ORDER — UBIDECARENONE 100 MG
100 CAPSULE ORAL DAILY
Status: DISCONTINUED | OUTPATIENT
Start: 2019-10-10 | End: 2019-10-09 | Stop reason: RX

## 2019-10-09 RX ORDER — SODIUM CHLORIDE 0.9 % (FLUSH) 0.9 %
10 SYRINGE (ML) INJECTION EVERY 12 HOURS SCHEDULED
Status: DISCONTINUED | OUTPATIENT
Start: 2019-10-09 | End: 2019-10-10 | Stop reason: HOSPADM

## 2019-10-09 RX ORDER — DILTIAZEM HYDROCHLORIDE 5 MG/ML
10 INJECTION INTRAVENOUS ONCE
Status: COMPLETED | OUTPATIENT
Start: 2019-10-09 | End: 2019-10-09

## 2019-10-09 RX ORDER — ACETAMINOPHEN,DIPHENHYDRAMINE HCL 500; 25 MG/1; MG/1
2 TABLET, FILM COATED ORAL NIGHTLY PRN
Status: DISCONTINUED | OUTPATIENT
Start: 2019-10-10 | End: 2019-10-09 | Stop reason: CLARIF

## 2019-10-09 RX ORDER — M-VIT,TX,IRON,MINS/CALC/FOLIC 27MG-0.4MG
1 TABLET ORAL DAILY
Status: DISCONTINUED | OUTPATIENT
Start: 2019-10-10 | End: 2019-10-10 | Stop reason: HOSPADM

## 2019-10-09 RX ORDER — CLOPIDOGREL BISULFATE 75 MG/1
75 TABLET ORAL DAILY
COMMUNITY
End: 2019-10-15

## 2019-10-09 RX ORDER — DIPHENHYDRAMINE HCL 25 MG
50 TABLET ORAL NIGHTLY PRN
Status: DISCONTINUED | OUTPATIENT
Start: 2019-10-09 | End: 2019-10-10 | Stop reason: HOSPADM

## 2019-10-09 RX ORDER — CLOPIDOGREL BISULFATE 75 MG/1
75 TABLET ORAL DAILY
Status: DISCONTINUED | OUTPATIENT
Start: 2019-10-10 | End: 2019-10-10 | Stop reason: HOSPADM

## 2019-10-09 RX ORDER — HEPARIN SODIUM 1000 [USP'U]/ML
40 INJECTION, SOLUTION INTRAVENOUS; SUBCUTANEOUS PRN
Status: DISCONTINUED | OUTPATIENT
Start: 2019-10-09 | End: 2019-10-10 | Stop reason: ALTCHOICE

## 2019-10-09 RX ORDER — ONDANSETRON 2 MG/ML
4 INJECTION INTRAMUSCULAR; INTRAVENOUS EVERY 6 HOURS PRN
Status: DISCONTINUED | OUTPATIENT
Start: 2019-10-09 | End: 2019-10-09 | Stop reason: SDUPTHER

## 2019-10-09 RX ORDER — HEPARIN SODIUM 1000 [USP'U]/ML
80 INJECTION, SOLUTION INTRAVENOUS; SUBCUTANEOUS PRN
Status: DISCONTINUED | OUTPATIENT
Start: 2019-10-09 | End: 2019-10-10 | Stop reason: ALTCHOICE

## 2019-10-09 RX ORDER — LEVOTHYROXINE SODIUM 0.03 MG/1
25 TABLET ORAL
Status: DISCONTINUED | OUTPATIENT
Start: 2019-10-10 | End: 2019-10-10 | Stop reason: HOSPADM

## 2019-10-09 RX ORDER — ONDANSETRON 2 MG/ML
4 INJECTION INTRAMUSCULAR; INTRAVENOUS EVERY 6 HOURS PRN
Status: DISCONTINUED | OUTPATIENT
Start: 2019-10-09 | End: 2019-10-10 | Stop reason: HOSPADM

## 2019-10-09 RX ORDER — PANTOPRAZOLE SODIUM 40 MG/1
40 TABLET, DELAYED RELEASE ORAL
Status: DISCONTINUED | OUTPATIENT
Start: 2019-10-10 | End: 2019-10-10 | Stop reason: HOSPADM

## 2019-10-09 RX ORDER — ASCORBIC ACID 500 MG
1000 TABLET ORAL DAILY
Status: DISCONTINUED | OUTPATIENT
Start: 2019-10-10 | End: 2019-10-10 | Stop reason: HOSPADM

## 2019-10-09 RX ORDER — ACETAMINOPHEN,DIPHENHYDRAMINE HCL 500; 25 MG/1; MG/1
2 TABLET, FILM COATED ORAL NIGHTLY PRN
COMMUNITY
End: 2022-02-10

## 2019-10-09 RX ADMIN — ACETAMINOPHEN 1000 MG: 500 TABLET ORAL at 22:04

## 2019-10-09 RX ADMIN — DIPHENHYDRAMINE HCL 50 MG: 25 TABLET ORAL at 22:04

## 2019-10-09 RX ADMIN — DILTIAZEM HYDROCHLORIDE 10 MG: 5 INJECTION INTRAVENOUS at 17:59

## 2019-10-09 RX ADMIN — DILTIAZEM HYDROCHLORIDE 5 MG/HR: 5 INJECTION INTRAVENOUS at 18:58

## 2019-10-09 RX ADMIN — TAMSULOSIN HYDROCHLORIDE 0.4 MG: 0.4 CAPSULE ORAL at 22:04

## 2019-10-09 RX ADMIN — HEPARIN SODIUM 7800 UNITS: 1000 INJECTION INTRAVENOUS; SUBCUTANEOUS at 20:05

## 2019-10-09 RX ADMIN — HEPARIN SODIUM 18 UNITS/KG/HR: 10000 INJECTION, SOLUTION INTRAVENOUS at 20:04

## 2019-10-09 ASSESSMENT — ENCOUNTER SYMPTOMS
COUGH: 0
SHORTNESS OF BREATH: 0
RHINORRHEA: 0
NAUSEA: 0
WHEEZING: 0
VOMITING: 0
BACK PAIN: 0
EYE DISCHARGE: 0
SORE THROAT: 0
EYE REDNESS: 0
DIARRHEA: 0
ABDOMINAL PAIN: 0

## 2019-10-09 ASSESSMENT — PAIN DESCRIPTION - PAIN TYPE: TYPE: ACUTE PAIN

## 2019-10-09 ASSESSMENT — PAIN SCALES - GENERAL: PAINLEVEL_OUTOF10: 0

## 2019-10-09 ASSESSMENT — PAIN DESCRIPTION - LOCATION: LOCATION: CHEST

## 2019-10-10 ENCOUNTER — TELEPHONE (OUTPATIENT)
Dept: FAMILY MEDICINE CLINIC | Age: 69
End: 2019-10-10

## 2019-10-10 VITALS
DIASTOLIC BLOOD PRESSURE: 67 MMHG | OXYGEN SATURATION: 98 % | BODY MASS INDEX: 32.2 KG/M2 | SYSTOLIC BLOOD PRESSURE: 134 MMHG | HEART RATE: 57 BPM | WEIGHT: 217.4 LBS | RESPIRATION RATE: 16 BRPM | TEMPERATURE: 97.6 F | HEIGHT: 69 IN

## 2019-10-10 LAB
ANION GAP SERPL CALCULATED.3IONS-SCNC: 18 MEQ/L (ref 8–16)
APTT: 103.9 SECONDS (ref 22–38)
APTT: > 200 SECONDS (ref 22–38)
BUN BLDV-MCNC: 14 MG/DL (ref 7–22)
CALCIUM SERPL-MCNC: 9.4 MG/DL (ref 8.5–10.5)
CHLORIDE BLD-SCNC: 97 MEQ/L (ref 98–111)
CO2: 23 MEQ/L (ref 23–33)
CREAT SERPL-MCNC: 0.9 MG/DL (ref 0.4–1.2)
EKG ATRIAL RATE: 67 BPM
EKG ATRIAL RATE: 69 BPM
EKG P AXIS: 50 DEGREES
EKG P AXIS: 62 DEGREES
EKG P-R INTERVAL: 192 MS
EKG P-R INTERVAL: 210 MS
EKG Q-T INTERVAL: 394 MS
EKG Q-T INTERVAL: 398 MS
EKG QRS DURATION: 84 MS
EKG QRS DURATION: 84 MS
EKG QTC CALCULATION (BAZETT): 420 MS
EKG QTC CALCULATION (BAZETT): 422 MS
EKG R AXIS: -12 DEGREES
EKG R AXIS: -20 DEGREES
EKG T AXIS: -13 DEGREES
EKG T AXIS: 38 DEGREES
EKG VENTRICULAR RATE: 67 BPM
EKG VENTRICULAR RATE: 69 BPM
ERYTHROCYTE [DISTWIDTH] IN BLOOD BY AUTOMATED COUNT: 13.4 % (ref 11.5–14.5)
ERYTHROCYTE [DISTWIDTH] IN BLOOD BY AUTOMATED COUNT: 44 FL (ref 35–45)
GFR SERPL CREATININE-BSD FRML MDRD: 84 ML/MIN/1.73M2
GLUCOSE BLD-MCNC: 131 MG/DL (ref 70–108)
HCT VFR BLD CALC: 46 % (ref 42–52)
HEMOGLOBIN: 15.9 GM/DL (ref 14–18)
LV EF: 65 %
LVEF MODALITY: NORMAL
MAGNESIUM: 2 MG/DL (ref 1.6–2.4)
MCH RBC QN AUTO: 31.2 PG (ref 26–33)
MCHC RBC AUTO-ENTMCNC: 34.6 GM/DL (ref 32.2–35.5)
MCV RBC AUTO: 90.2 FL (ref 80–94)
PLATELET # BLD: 147 THOU/MM3 (ref 130–400)
PMV BLD AUTO: 11.7 FL (ref 9.4–12.4)
POTASSIUM SERPL-SCNC: 3.7 MEQ/L (ref 3.5–5.2)
RBC # BLD: 5.1 MILL/MM3 (ref 4.7–6.1)
SODIUM BLD-SCNC: 138 MEQ/L (ref 135–145)
TROPONIN T: < 0.01 NG/ML
WBC # BLD: 9.8 THOU/MM3 (ref 4.8–10.8)

## 2019-10-10 PROCEDURE — 99232 SBSQ HOSP IP/OBS MODERATE 35: CPT | Performed by: INTERNAL MEDICINE

## 2019-10-10 PROCEDURE — 93005 ELECTROCARDIOGRAM TRACING: CPT | Performed by: INTERNAL MEDICINE

## 2019-10-10 PROCEDURE — 6370000000 HC RX 637 (ALT 250 FOR IP): Performed by: INTERNAL MEDICINE

## 2019-10-10 PROCEDURE — 80048 BASIC METABOLIC PNL TOTAL CA: CPT

## 2019-10-10 PROCEDURE — 36415 COLL VENOUS BLD VENIPUNCTURE: CPT

## 2019-10-10 PROCEDURE — 93010 ELECTROCARDIOGRAM REPORT: CPT | Performed by: NUCLEAR MEDICINE

## 2019-10-10 PROCEDURE — 84484 ASSAY OF TROPONIN QUANT: CPT

## 2019-10-10 PROCEDURE — 83735 ASSAY OF MAGNESIUM: CPT

## 2019-10-10 PROCEDURE — 93306 TTE W/DOPPLER COMPLETE: CPT

## 2019-10-10 PROCEDURE — 94760 N-INVAS EAR/PLS OXIMETRY 1: CPT

## 2019-10-10 PROCEDURE — 85730 THROMBOPLASTIN TIME PARTIAL: CPT

## 2019-10-10 PROCEDURE — 99221 1ST HOSP IP/OBS SF/LOW 40: CPT | Performed by: INTERNAL MEDICINE

## 2019-10-10 PROCEDURE — 85027 COMPLETE CBC AUTOMATED: CPT

## 2019-10-10 RX ORDER — POTASSIUM CHLORIDE 750 MG/1
20 TABLET, FILM COATED, EXTENDED RELEASE ORAL ONCE
Status: COMPLETED | OUTPATIENT
Start: 2019-10-10 | End: 2019-10-10

## 2019-10-10 RX ORDER — ATENOLOL 25 MG/1
25 TABLET ORAL 2 TIMES DAILY
Status: DISCONTINUED | OUTPATIENT
Start: 2019-10-10 | End: 2019-10-10 | Stop reason: HOSPADM

## 2019-10-10 RX ORDER — ATENOLOL 25 MG/1
25 TABLET ORAL 2 TIMES DAILY
Qty: 90 TABLET | Refills: 3
Start: 2019-10-10 | End: 2019-10-29 | Stop reason: SDUPTHER

## 2019-10-10 RX ADMIN — MULTIPLE VITAMINS W/ MINERALS TAB 1 TABLET: TAB at 08:30

## 2019-10-10 RX ADMIN — LEVOTHYROXINE SODIUM 25 MCG: 25 TABLET ORAL at 05:50

## 2019-10-10 RX ADMIN — APIXABAN 5 MG: 5 TABLET, FILM COATED ORAL at 12:00

## 2019-10-10 RX ADMIN — ATENOLOL 25 MG: 25 TABLET ORAL at 10:10

## 2019-10-10 RX ADMIN — Medication 1000 MG: at 08:30

## 2019-10-10 RX ADMIN — CLOPIDOGREL BISULFATE 75 MG: 75 TABLET ORAL at 08:30

## 2019-10-10 RX ADMIN — ASPIRIN 81 MG: 81 TABLET ORAL at 08:30

## 2019-10-10 RX ADMIN — PANTOPRAZOLE SODIUM 40 MG: 40 TABLET, DELAYED RELEASE ORAL at 05:50

## 2019-10-10 RX ADMIN — POTASSIUM CHLORIDE 20 MEQ: 750 TABLET, FILM COATED, EXTENDED RELEASE ORAL at 12:00

## 2019-10-10 ASSESSMENT — PAIN SCALES - GENERAL
PAINLEVEL_OUTOF10: 0

## 2019-10-11 ENCOUNTER — TELEPHONE (OUTPATIENT)
Dept: FAMILY MEDICINE CLINIC | Age: 69
End: 2019-10-11

## 2019-10-15 ENCOUNTER — OFFICE VISIT (OUTPATIENT)
Dept: FAMILY MEDICINE CLINIC | Age: 69
End: 2019-10-15
Payer: MEDICARE

## 2019-10-15 VITALS
RESPIRATION RATE: 12 BRPM | DIASTOLIC BLOOD PRESSURE: 70 MMHG | HEIGHT: 69 IN | SYSTOLIC BLOOD PRESSURE: 136 MMHG | WEIGHT: 217.3 LBS | BODY MASS INDEX: 32.19 KG/M2 | HEART RATE: 64 BPM

## 2019-10-15 DIAGNOSIS — I73.9 PAD (PERIPHERAL ARTERY DISEASE) (HCC): ICD-10-CM

## 2019-10-15 DIAGNOSIS — I48.91 ATRIAL FIBRILLATION WITH RVR (HCC): Primary | ICD-10-CM

## 2019-10-15 DIAGNOSIS — E78.2 MIXED HYPERLIPIDEMIA: ICD-10-CM

## 2019-10-15 DIAGNOSIS — I10 ESSENTIAL HYPERTENSION: ICD-10-CM

## 2019-10-15 DIAGNOSIS — I25.110 CORONARY ARTERY DISEASE INVOLVING NATIVE CORONARY ARTERY OF NATIVE HEART WITH UNSTABLE ANGINA PECTORIS (HCC): ICD-10-CM

## 2019-10-15 DIAGNOSIS — I48.3 TYPICAL ATRIAL FLUTTER (HCC): ICD-10-CM

## 2019-10-15 PROCEDURE — 99495 TRANSJ CARE MGMT MOD F2F 14D: CPT | Performed by: NURSE PRACTITIONER

## 2019-10-15 ASSESSMENT — ENCOUNTER SYMPTOMS
COUGH: 0
SHORTNESS OF BREATH: 0
SORE THROAT: 0

## 2019-10-29 ENCOUNTER — OFFICE VISIT (OUTPATIENT)
Dept: CARDIOLOGY CLINIC | Age: 69
End: 2019-10-29
Payer: MEDICARE

## 2019-10-29 VITALS
DIASTOLIC BLOOD PRESSURE: 76 MMHG | BODY MASS INDEX: 31.99 KG/M2 | HEART RATE: 70 BPM | WEIGHT: 216 LBS | SYSTOLIC BLOOD PRESSURE: 136 MMHG | HEIGHT: 69 IN

## 2019-10-29 DIAGNOSIS — I25.10 CORONARY ARTERY DISEASE INVOLVING NATIVE CORONARY ARTERY OF NATIVE HEART WITHOUT ANGINA PECTORIS: ICD-10-CM

## 2019-10-29 DIAGNOSIS — Z95.820 S/P ANGIOPLASTY WITH STENT: ICD-10-CM

## 2019-10-29 DIAGNOSIS — Z09 HOSPITAL DISCHARGE FOLLOW-UP: Primary | ICD-10-CM

## 2019-10-29 DIAGNOSIS — E78.01 FAMILIAL HYPERCHOLESTEROLEMIA: ICD-10-CM

## 2019-10-29 DIAGNOSIS — I48.92 ATRIAL FLUTTER, UNSPECIFIED TYPE (HCC): ICD-10-CM

## 2019-10-29 DIAGNOSIS — I10 ESSENTIAL HYPERTENSION: ICD-10-CM

## 2019-10-29 PROCEDURE — G8427 DOCREV CUR MEDS BY ELIG CLIN: HCPCS | Performed by: NURSE PRACTITIONER

## 2019-10-29 PROCEDURE — G8417 CALC BMI ABV UP PARAM F/U: HCPCS | Performed by: NURSE PRACTITIONER

## 2019-10-29 PROCEDURE — 1111F DSCHRG MED/CURRENT MED MERGE: CPT | Performed by: NURSE PRACTITIONER

## 2019-10-29 PROCEDURE — G8482 FLU IMMUNIZE ORDER/ADMIN: HCPCS | Performed by: NURSE PRACTITIONER

## 2019-10-29 PROCEDURE — G8598 ASA/ANTIPLAT THER USED: HCPCS | Performed by: NURSE PRACTITIONER

## 2019-10-29 PROCEDURE — 4040F PNEUMOC VAC/ADMIN/RCVD: CPT | Performed by: NURSE PRACTITIONER

## 2019-10-29 PROCEDURE — 99213 OFFICE O/P EST LOW 20 MIN: CPT | Performed by: NURSE PRACTITIONER

## 2019-10-29 PROCEDURE — 1036F TOBACCO NON-USER: CPT | Performed by: NURSE PRACTITIONER

## 2019-10-29 PROCEDURE — 1123F ACP DISCUSS/DSCN MKR DOCD: CPT | Performed by: NURSE PRACTITIONER

## 2019-10-29 PROCEDURE — 3017F COLORECTAL CA SCREEN DOC REV: CPT | Performed by: NURSE PRACTITIONER

## 2019-10-29 RX ORDER — ATENOLOL 25 MG/1
25 TABLET ORAL 2 TIMES DAILY
Qty: 180 TABLET | Refills: 3 | Status: SHIPPED | OUTPATIENT
Start: 2019-10-29 | End: 2021-01-11

## 2019-12-19 ENCOUNTER — OFFICE VISIT (OUTPATIENT)
Dept: FAMILY MEDICINE CLINIC | Age: 69
End: 2019-12-19
Payer: MEDICARE

## 2019-12-19 VITALS
DIASTOLIC BLOOD PRESSURE: 62 MMHG | HEART RATE: 57 BPM | RESPIRATION RATE: 13 BRPM | WEIGHT: 218.4 LBS | SYSTOLIC BLOOD PRESSURE: 116 MMHG | BODY MASS INDEX: 32.25 KG/M2 | OXYGEN SATURATION: 98 %

## 2019-12-19 DIAGNOSIS — E78.2 MIXED HYPERLIPIDEMIA: ICD-10-CM

## 2019-12-19 DIAGNOSIS — Z51.81 MEDICATION MONITORING ENCOUNTER: ICD-10-CM

## 2019-12-19 DIAGNOSIS — N40.1 HYPERTROPHY OF PROSTATE WITH URINARY OBSTRUCTION: ICD-10-CM

## 2019-12-19 DIAGNOSIS — I73.9 PAD (PERIPHERAL ARTERY DISEASE) (HCC): ICD-10-CM

## 2019-12-19 DIAGNOSIS — N13.8 HYPERTROPHY OF PROSTATE WITH URINARY OBSTRUCTION: ICD-10-CM

## 2019-12-19 DIAGNOSIS — E03.9 ACQUIRED HYPOTHYROIDISM: ICD-10-CM

## 2019-12-19 DIAGNOSIS — I25.110 CORONARY ARTERY DISEASE INVOLVING NATIVE CORONARY ARTERY OF NATIVE HEART WITH UNSTABLE ANGINA PECTORIS (HCC): ICD-10-CM

## 2019-12-19 DIAGNOSIS — I10 ESSENTIAL HYPERTENSION: Primary | ICD-10-CM

## 2019-12-19 DIAGNOSIS — I48.3 TYPICAL ATRIAL FLUTTER (HCC): ICD-10-CM

## 2019-12-19 PROCEDURE — 1036F TOBACCO NON-USER: CPT | Performed by: FAMILY MEDICINE

## 2019-12-19 PROCEDURE — G8482 FLU IMMUNIZE ORDER/ADMIN: HCPCS | Performed by: FAMILY MEDICINE

## 2019-12-19 PROCEDURE — 1123F ACP DISCUSS/DSCN MKR DOCD: CPT | Performed by: FAMILY MEDICINE

## 2019-12-19 PROCEDURE — G8417 CALC BMI ABV UP PARAM F/U: HCPCS | Performed by: FAMILY MEDICINE

## 2019-12-19 PROCEDURE — G8427 DOCREV CUR MEDS BY ELIG CLIN: HCPCS | Performed by: FAMILY MEDICINE

## 2019-12-19 PROCEDURE — G8598 ASA/ANTIPLAT THER USED: HCPCS | Performed by: FAMILY MEDICINE

## 2019-12-19 PROCEDURE — 3017F COLORECTAL CA SCREEN DOC REV: CPT | Performed by: FAMILY MEDICINE

## 2019-12-19 PROCEDURE — 99214 OFFICE O/P EST MOD 30 MIN: CPT | Performed by: FAMILY MEDICINE

## 2019-12-19 PROCEDURE — 4040F PNEUMOC VAC/ADMIN/RCVD: CPT | Performed by: FAMILY MEDICINE

## 2019-12-19 RX ORDER — VALSARTAN AND HYDROCHLOROTHIAZIDE 160; 12.5 MG/1; MG/1
1 TABLET, FILM COATED ORAL DAILY
Qty: 90 TABLET | Refills: 3 | Status: SHIPPED | OUTPATIENT
Start: 2019-12-19 | End: 2020-09-08

## 2019-12-19 RX ORDER — TAMSULOSIN HYDROCHLORIDE 0.4 MG/1
0.4 CAPSULE ORAL NIGHTLY
Qty: 30 CAPSULE | Refills: 5 | Status: SHIPPED | OUTPATIENT
Start: 2019-12-19 | End: 2021-06-07

## 2019-12-19 ASSESSMENT — ENCOUNTER SYMPTOMS
ALLERGIC/IMMUNOLOGIC NEGATIVE: 1
RESPIRATORY NEGATIVE: 1
GASTROINTESTINAL NEGATIVE: 1
SHORTNESS OF BREATH: 0
COUGH: 0

## 2019-12-30 ENCOUNTER — OFFICE VISIT (OUTPATIENT)
Dept: CARDIOLOGY CLINIC | Age: 69
End: 2019-12-30
Payer: MEDICARE

## 2019-12-30 VITALS
HEART RATE: 74 BPM | HEIGHT: 70 IN | WEIGHT: 218.8 LBS | SYSTOLIC BLOOD PRESSURE: 138 MMHG | BODY MASS INDEX: 31.32 KG/M2 | DIASTOLIC BLOOD PRESSURE: 80 MMHG

## 2019-12-30 DIAGNOSIS — I25.10 CORONARY ARTERY DISEASE INVOLVING NATIVE CORONARY ARTERY OF NATIVE HEART WITHOUT ANGINA PECTORIS: Primary | ICD-10-CM

## 2019-12-30 DIAGNOSIS — I10 ESSENTIAL HYPERTENSION: ICD-10-CM

## 2019-12-30 DIAGNOSIS — I48.4 ATYPICAL ATRIAL FLUTTER (HCC): ICD-10-CM

## 2019-12-30 PROCEDURE — G8427 DOCREV CUR MEDS BY ELIG CLIN: HCPCS | Performed by: NUCLEAR MEDICINE

## 2019-12-30 PROCEDURE — 4040F PNEUMOC VAC/ADMIN/RCVD: CPT | Performed by: NUCLEAR MEDICINE

## 2019-12-30 PROCEDURE — 1036F TOBACCO NON-USER: CPT | Performed by: NUCLEAR MEDICINE

## 2019-12-30 PROCEDURE — G8417 CALC BMI ABV UP PARAM F/U: HCPCS | Performed by: NUCLEAR MEDICINE

## 2019-12-30 PROCEDURE — G8598 ASA/ANTIPLAT THER USED: HCPCS | Performed by: NUCLEAR MEDICINE

## 2019-12-30 PROCEDURE — 99214 OFFICE O/P EST MOD 30 MIN: CPT | Performed by: NUCLEAR MEDICINE

## 2019-12-30 PROCEDURE — 3017F COLORECTAL CA SCREEN DOC REV: CPT | Performed by: NUCLEAR MEDICINE

## 2019-12-30 PROCEDURE — G8482 FLU IMMUNIZE ORDER/ADMIN: HCPCS | Performed by: NUCLEAR MEDICINE

## 2019-12-30 PROCEDURE — 1123F ACP DISCUSS/DSCN MKR DOCD: CPT | Performed by: NUCLEAR MEDICINE

## 2019-12-30 RX ORDER — CLOPIDOGREL BISULFATE 75 MG/1
75 TABLET ORAL DAILY
COMMUNITY
End: 2020-08-06

## 2020-03-25 PROBLEM — E78.5 HYPERLIPIDEMIA: Status: RESOLVED | Noted: 2020-03-25 | Resolved: 2020-03-24

## 2020-03-30 RX ORDER — APIXABAN 5 MG/1
TABLET, FILM COATED ORAL
Qty: 60 TABLET | Refills: 0 | OUTPATIENT
Start: 2020-03-30

## 2020-04-01 RX ORDER — APIXABAN 5 MG/1
TABLET, FILM COATED ORAL
Qty: 180 TABLET | Refills: 1 | Status: SHIPPED | OUTPATIENT
Start: 2020-04-01 | End: 2020-09-30

## 2020-04-27 ENCOUNTER — TELEPHONE (OUTPATIENT)
Dept: CARDIOLOGY CLINIC | Age: 70
End: 2020-04-27

## 2020-04-27 RX ORDER — NITROGLYCERIN 0.4 MG/1
0.4 TABLET SUBLINGUAL EVERY 5 MIN PRN
Qty: 25 TABLET | Refills: 1 | Status: SHIPPED | OUTPATIENT
Start: 2020-04-27 | End: 2020-08-31

## 2020-04-27 RX ORDER — NITROGLYCERIN 0.4 MG/1
0.4 TABLET SUBLINGUAL EVERY 5 MIN PRN
COMMUNITY
End: 2020-04-27 | Stop reason: SDUPTHER

## 2020-04-29 ENCOUNTER — TELEPHONE (OUTPATIENT)
Dept: CARDIOLOGY CLINIC | Age: 70
End: 2020-04-29

## 2020-04-30 ENCOUNTER — TELEPHONE (OUTPATIENT)
Dept: CARDIOLOGY CLINIC | Age: 70
End: 2020-04-30

## 2020-04-30 ENCOUNTER — VIRTUAL VISIT (OUTPATIENT)
Dept: CARDIOLOGY CLINIC | Age: 70
End: 2020-04-30
Payer: MEDICARE

## 2020-04-30 ENCOUNTER — E-VISIT (OUTPATIENT)
Dept: CARDIOLOGY CLINIC | Age: 70
End: 2020-04-30

## 2020-04-30 PROCEDURE — 99999 PR OFFICE/OUTPT VISIT,PROCEDURE ONLY: CPT | Performed by: NUCLEAR MEDICINE

## 2020-04-30 PROCEDURE — 4040F PNEUMOC VAC/ADMIN/RCVD: CPT | Performed by: NUCLEAR MEDICINE

## 2020-04-30 PROCEDURE — 3017F COLORECTAL CA SCREEN DOC REV: CPT | Performed by: NUCLEAR MEDICINE

## 2020-04-30 PROCEDURE — 1123F ACP DISCUSS/DSCN MKR DOCD: CPT | Performed by: NUCLEAR MEDICINE

## 2020-04-30 PROCEDURE — G8428 CUR MEDS NOT DOCUMENT: HCPCS | Performed by: NUCLEAR MEDICINE

## 2020-04-30 PROCEDURE — 99214 OFFICE O/P EST MOD 30 MIN: CPT | Performed by: NUCLEAR MEDICINE

## 2020-04-30 NOTE — TELEPHONE ENCOUNTER
Patient had a video visit today with  [facility], and he is calling in now to schedule for an event monitor. Please verify and call him back to set up.

## 2020-04-30 NOTE — PROGRESS NOTES
Relation Age of Onset    High Blood Pressure Mother     High Blood Pressure Father     Diabetes Sister     Heart Disease Maternal Grandmother     High Blood Pressure Maternal Grandmother     High Blood Pressure Maternal Grandfather     High Blood Pressure Paternal Grandmother     High Blood Pressure Paternal Grandfather     Diabetes Brother      Social History     Tobacco Use    Smoking status: Former Smoker     Packs/day: 2.00     Years: 20.00     Pack years: 40.00     Types: Cigarettes     Last attempt to quit: 1992     Years since quittin.9    Smokeless tobacco: Never Used   Substance Use Topics    Alcohol use: Yes     Comment: glass red wine daily      Current Outpatient Medications   Medication Sig Dispense Refill    nitroGLYCERIN (NITROSTAT) 0.4 MG SL tablet Place 1 tablet under the tongue every 5 minutes as needed for Chest pain up to max of 3 total doses. If no relief after 1 dose, call 911. 25 tablet 1    ELIQUIS 5 MG TABS tablet TAKE 1 TABLET BY MOUTH EVERY 12 HOURS 180 tablet 1    clopidogrel (PLAVIX) 75 MG tablet Take 75 mg by mouth daily      tamsulosin (FLOMAX) 0.4 MG capsule Take 1 capsule by mouth nightly (Patient taking differently: Take 0.4 mg by mouth as needed ) 30 capsule 5    valsartan-hydrochlorothiazide (DIOVAN-HCT) 160-12.5 MG per tablet Take 1 tablet by mouth daily 90 tablet 3    atenolol (TENORMIN) 25 MG tablet Take 1 tablet by mouth 2 times daily 180 tablet 3    diphenhydrAMINE-APAP, sleep, (TYLENOL PM EXTRA STRENGTH)  MG tablet Take 2 tablets by mouth nightly as needed for Sleep      SYNTHROID 25 MCG tablet Take 1 tablet by mouth daily Take with water on an empty stomach- wait 30 minutes before eating or taking other meds.  90 tablet 3    Omeprazole (PRILOSEC PO) Take 10 mg by mouth daily       Multiple Vitamins-Minerals (THERAPEUTIC MULTIVITAMIN-MINERALS) tablet Take 1 tablet by mouth daily      Ascorbic Acid (VITAMIN C PO) Take 1,000 mg by mouth

## 2020-05-06 ENCOUNTER — HOSPITAL ENCOUNTER (OUTPATIENT)
Dept: NON INVASIVE DIAGNOSTICS | Age: 70
Discharge: HOME | End: 2020-05-06
Payer: MEDICARE

## 2020-05-06 PROCEDURE — 93270 REMOTE 30 DAY ECG REV/REPORT: CPT

## 2020-06-09 ENCOUNTER — TELEPHONE (OUTPATIENT)
Dept: FAMILY MEDICINE CLINIC | Age: 70
End: 2020-06-09

## 2020-06-13 ENCOUNTER — HOSPITAL ENCOUNTER (OUTPATIENT)
Age: 70
Discharge: HOME | End: 2020-06-13
Payer: MEDICARE

## 2020-06-13 LAB
CHOLESTEROL, TOTAL: 296 MG/DL (ref 100–199)
HDLC SERPL-MCNC: 29 MG/DL
LDL CHOLESTEROL CALCULATED: ABNORMAL MG/DL
TRIGL SERPL-MCNC: 768 MG/DL (ref 0–199)

## 2020-06-13 PROCEDURE — 80061 LIPID PANEL: CPT

## 2020-06-13 PROCEDURE — 36415 COLL VENOUS BLD VENIPUNCTURE: CPT

## 2020-06-15 RX ORDER — LEVOTHYROXINE SODIUM 25 MCG
TABLET ORAL
Qty: 90 TABLET | Refills: 0 | Status: SHIPPED | OUTPATIENT
Start: 2020-06-15 | End: 2020-06-19 | Stop reason: SINTOL

## 2020-06-19 ENCOUNTER — VIRTUAL VISIT (OUTPATIENT)
Dept: FAMILY MEDICINE CLINIC | Age: 70
End: 2020-06-19
Payer: MEDICARE

## 2020-06-19 PROCEDURE — 99214 OFFICE O/P EST MOD 30 MIN: CPT | Performed by: FAMILY MEDICINE

## 2020-06-19 RX ORDER — LEVOTHYROXINE AND LIOTHYRONINE 38; 9 UG/1; UG/1
60 TABLET ORAL DAILY
Qty: 30 TABLET | Refills: 2 | Status: SHIPPED | OUTPATIENT
Start: 2020-06-19 | End: 2020-09-11

## 2020-06-19 ASSESSMENT — ENCOUNTER SYMPTOMS
RESPIRATORY NEGATIVE: 1
GASTROINTESTINAL NEGATIVE: 1
ALLERGIC/IMMUNOLOGIC NEGATIVE: 1

## 2020-06-19 NOTE — PROGRESS NOTES
CNP   diphenhydrAMINE-APAP, sleep, (TYLENOL PM EXTRA STRENGTH)  MG tablet Take 2 tablets by mouth nightly as needed for Sleep  Historical Provider, MD   Omeprazole (PRILOSEC PO) Take 10 mg by mouth daily   Historical Provider, MD   Multiple Vitamins-Minerals (THERAPEUTIC MULTIVITAMIN-MINERALS) tablet Take 1 tablet by mouth daily  Historical Provider, MD   Ascorbic Acid (VITAMIN C PO) Take 1,000 mg by mouth daily   Historical Provider, MD   Coenzyme Q10 100 MG CAPS Take 100 mg by mouth daily. Historical Provider, MD       Social History     Tobacco Use    Smoking status: Former Smoker     Packs/day: 2.00     Years: 20.00     Pack years: 40.00     Types: Cigarettes     Last attempt to quit: 1992     Years since quittin.0    Smokeless tobacco: Never Used   Substance Use Topics    Alcohol use: Yes     Comment: glass red wine daily    Drug use: No        Allergies   Allergen Reactions    Crestor [Rosuvastatin]      Muscle aches    Tape [Adhesive Tape] Rash     Blisters and red       PHYSICAL EXAMINATION:  [ INSTRUCTIONS:  \"[x]\" Indicates a positive item  \"[]\" Indicates a negative item  -- DELETE ALL ITEMS NOT EXAMINED]  Vital Signs: (As obtained by patient/caregiver or practitioner observation)    Blood pressure-140/82  Heart rate- 75   Respiratory rate- 16   Temperature-  Pulse oximetry-     Constitutional: [x] Appears well-developed and well-nourished [x] No apparent distress      [] Abnormal-   Mental status  [x] Alert and awake  [x] Oriented to person/place/time []Able to follow commands      Eyes:  EOM    [x]  Normal  [] Abnormal-  Sclera  [x]  Normal  [] Abnormal -         Discharge [x]  None visible  [] Abnormal -    HENT:   [x] Normocephalic, atraumatic.   [] Abnormal   [] Mouth/Throat: Mucous membranes are moist.     External Ears [] Normal  [] Abnormal-     Neck: [x] No visualized mass     Pulmonary/Chest: [x] Respiratory effort normal.  [] No visualized signs of difficulty breathing or respiratory distress        [] Abnormal-      Musculoskeletal:   [x] Normal gait with no signs of ataxia         [] Normal range of motion of neck        [] Abnormal-       Neurological:        [x] No Facial Asymmetry (Cranial nerve 7 motor function) (limited exam to video visit)          [] No gaze palsy        [] Abnormal-         Skin:        [x] No significant exanthematous lesions or discoloration noted on facial skin         [] Abnormal-            Psychiatric:       [x] Normal Affect [] No Hallucinations        [] Abnormal-     Other pertinent observable physical exam findings-     ASSESSMENT/PLAN:  1. Acquired hypothyroidism    - thyroid (ARMOUR THYROID) 60 MG tablet; Take 1 tablet by mouth daily  Dispense: 30 tablet; Refill: 2  - TSH With Reflex Ft4; Future    2. Coronary artery disease involving native coronary artery of native heart with unstable angina pectoris ([facility] [address]      3. Essential hypertension      4. Mixed hyperlipidemia      5. Medication monitoring encounter    - TSH With Reflex Ft4; Future    Start [place] Thyroid 60 mg daily. Repeat TSH level in 6 weeks to monitor medication dose. No follow-ups on file. Kaela Zhang is a 72 y.o. male being evaluated by a Virtual Visit (video visit) encounter to address concerns as mentioned above. A caregiver was present when appropriate. Due to this being a TeleHealth encounter (During SKYNT-57 public health emergency), evaluation of the following organ systems was limited: Vitals/Constitutional/EENT/Resp/CV/GI//MS/Neuro/Skin/Heme-Lymph-Imm. Pursuant to the emergency declaration under the [facility], [address] [place] [address] authority and the [employer] Act, this Virtual Visit was conducted with patient's (and/or legal guardian's) consent, to reduce the patient's risk of exposure to COVID-19 and provide necessary medical care.   The patient (and/or legal guardian) has also been advised to contact this office for worsening conditions or problems, and seek emergency medical treatment and/or call 911 if deemed necessary. Patient identification was verified at the start of the visit: Yes    Total time spent on this encounter: 20    Services were provided through a video synchronous discussion virtually to substitute for in-person clinic visit. Patient and provider were located at their individual homes. --Venkata Mckay MD on 6/19/2020 at 10:44 AM    An electronic signature was used to authenticate this note.

## 2020-08-06 RX ORDER — CLOPIDOGREL BISULFATE 75 MG/1
TABLET ORAL
Qty: 90 TABLET | Refills: 0 | Status: SHIPPED | OUTPATIENT
Start: 2020-08-06 | End: 2020-11-03

## 2020-08-21 NOTE — TELEPHONE ENCOUNTER
Pt states he has been having some higher bp readings averaging 160s/80s and having a nagging ongoing headache for about a week now, and disoriented at times asking if he can adjust and meds or anything recommendations.

## 2020-08-24 RX ORDER — AMLODIPINE BESYLATE 5 MG/1
5 TABLET ORAL DAILY
Qty: 30 TABLET | Refills: 11 | Status: SHIPPED | OUTPATIENT
Start: 2020-08-24 | End: 2020-11-04 | Stop reason: ALTCHOICE

## 2020-08-31 RX ORDER — NITROGLYCERIN 0.4 MG/1
TABLET SUBLINGUAL
Qty: 25 TABLET | Refills: 3 | Status: SHIPPED | OUTPATIENT
Start: 2020-08-31 | End: 2021-02-01 | Stop reason: SDUPTHER

## 2020-09-08 ENCOUNTER — TELEPHONE (OUTPATIENT)
Dept: FAMILY MEDICINE CLINIC | Age: 70
End: 2020-09-08

## 2020-09-08 NOTE — TELEPHONE ENCOUNTER
The patient called and is requesting lab orders to have his testosterone checked. He stated that he is experiencing fatigue, night sweats. Please advise.

## 2020-09-09 ENCOUNTER — HOSPITAL ENCOUNTER (OUTPATIENT)
Age: 70
Discharge: HOME | End: 2020-09-09
Payer: MEDICARE

## 2020-09-09 LAB — TSH SERPL DL<=0.05 MIU/L-ACNC: 2.69 UIU/ML (ref 0.4–4.2)

## 2020-09-09 PROCEDURE — 84402 ASSAY OF FREE TESTOSTERONE: CPT

## 2020-09-09 PROCEDURE — 36415 COLL VENOUS BLD VENIPUNCTURE: CPT

## 2020-09-09 PROCEDURE — 84270 ASSAY OF SEX HORMONE GLOBUL: CPT

## 2020-09-09 PROCEDURE — 84443 ASSAY THYROID STIM HORMONE: CPT

## 2020-09-09 PROCEDURE — 84403 ASSAY OF TOTAL TESTOSTERONE: CPT

## 2020-09-12 LAB — TESTOSTERONE FREE: NORMAL

## 2020-09-23 ENCOUNTER — TELEMEDICINE (OUTPATIENT)
Dept: FAMILY MEDICINE CLINIC | Age: 70
End: 2020-09-23
Payer: MEDICARE

## 2020-09-23 PROCEDURE — 1123F ACP DISCUSS/DSCN MKR DOCD: CPT | Performed by: FAMILY MEDICINE

## 2020-09-23 PROCEDURE — G0439 PPPS, SUBSEQ VISIT: HCPCS | Performed by: FAMILY MEDICINE

## 2020-09-23 PROCEDURE — 3017F COLORECTAL CA SCREEN DOC REV: CPT | Performed by: FAMILY MEDICINE

## 2020-09-23 PROCEDURE — 4040F PNEUMOC VAC/ADMIN/RCVD: CPT | Performed by: FAMILY MEDICINE

## 2020-09-23 NOTE — PROGRESS NOTES
Medicare Annual Wellness Visit  Name: Terie Sicard Date: 2020   MRN: [id number] Sex: Male   Age: 72 y.o. Ethnicity: Non-/Non    : 1950 Race: Judah Armijo is here for Medicare AWV    Screenings for behavioral, psychosocial and functional/safety risks, and cognitive dysfunction are all negative except as indicated below. These results, as well as other patient data from the [address] form, are documented in Flowsheets linked to this Encounter. Allergies   Allergen Reactions    Crestor [Rosuvastatin]      Muscle aches    Tape Marcelle Collet Tape] Rash     Blisters and red         Prior to Visit Medications    Medication Sig Taking? Authorizing Provider   NP THYROID 60 MG tablet Take 1 tablet by mouth once daily  Elle Glez MD   valsartan-hydrochlorothiazide (DIOVAN-HCT) 160-12.5 MG per tablet Take 1 tablet by mouth 2 times daily  Elle Glez MD   nitroGLYCERIN (NITROSTAT) 0.4 MG SL tablet DISSOLVE ONE TABLET UNDER THE TONGUE EVERY 5 MINUTES AS NEEDED FOR CHEST PAIN.   DO NOT EXCEED A TOTAL OF 3 DOSES IN 15 MINUTES  Ugo Munroe MD   amLODIPine (NORVASC) 5 MG tablet Take 1 tablet by mouth daily  Mehdi Li MD   clopidogrel (PLAVIX) 75 MG tablet Take 1 tablet by mouth once daily  Ugo Munroe MD   ELIQUIS 5 MG TABS tablet TAKE 1 TABLET BY MOUTH EVERY 12 HOURS  Ugo Sky MD   tamsulosin (FLOMAX) 0.4 MG capsule Take 1 capsule by mouth nightly  Patient taking differently: Take 0.4 mg by mouth as needed   Elle Glez MD   atenolol (TENORMIN) 25 MG tablet Take 1 tablet by mouth 2 times daily  Kirstin Abbott APRN - CNP   diphenhydrAMINE-APAP, sleep, (TYLENOL PM EXTRA STRENGTH)  MG tablet Take 2 tablets by mouth nightly as needed for Sleep  Historical Provider, MD   Omeprazole (PRILOSEC PO) Take 10 mg by mouth daily   Historical Provider, MD   Multiple Vitamins-Minerals (THERAPEUTIC MULTIVITAMIN-MINERALS) tablet Take 1 tablet by mouth daily  Historical Provider, MD   Ascorbic Acid (VITAMIN C PO) Take 1,000 mg by mouth daily   Historical Provider, MD   Coenzyme Q10 100 MG CAPS Take 100 mg by mouth daily.     Historical Provider, MD         Past Medical History:   Diagnosis Date    Acquired hypothyroidism 2/21/2019    CAD (coronary artery disease)     Cancer ([facility] [address]     SKIN    DDD (degenerative disc disease), cervical 4/10/2017    GERD (gastroesophageal reflux disease)     Hyperlipidemia     Hypertension        Past Surgical History:   Procedure Laterality Date    CARDIAC CATHETERIZATION  08/20/2004    EF 60%    CARDIAC CATHETERIZATION  03/01/2003    EF 60%    CARDIOVASCULAR STRESS TEST  02/25/2010    EF 63%    CARDIOVASCULAR STRESS TEST  11/17/2006    EF 68%    CARDIOVASCULAR STRESS TEST  08/18/2005    EF 48%    CARDIOVASCULAR STRESS TEST  07/27/2004    EF 63%    CARDIOVASCULAR STRESS TEST  03/18/2003    EF 43%    DIAGNOSTIC CARDIAC CATH LAB PROCEDURE  08/18/2000    EF 70%    PTCA  07/19/2006    CYPHER STENT TO MID RCA    TRANSTHORACIC ECHOCARDIOGRAM  02/25/2010    EF 55-65%         Family History   Problem Relation Age of Onset    High Blood Pressure Mother     High Blood Pressure Father     Diabetes Sister     Heart Disease Maternal Grandmother     High Blood Pressure Maternal Grandmother     High Blood Pressure Maternal Grandfather     High Blood Pressure Paternal Grandmother     High Blood Pressure Paternal Grandfather     Diabetes Brother        CareTeam (Including outside providers/suppliers regularly involved in providing care):   Patient Care Team:  Owen Arthur MD as PCP - General (Family Medicine)  Owen Arthur MD as PCP - [facility] Empaneled Provider  Shan Romeo MD as Consulting Physician (Cardiology)    Wt Readings from Last 3 Encounters:   12/30/19 218 lb 12.8 oz (99.2 kg)   12/19/19 218 lb 6.4 oz (99.1 kg)   10/29/19 216 lb (98 kg)     There were no vitals filed for this visit.  There is no height or weight on file to calculate BMI. Based upon direct observation of the patient, evaluation of cognition reveals recent and remote memory intact. General Appearance: alert and oriented to person, place and time, well developed and well- nourished, in no acute distress  Skin: warm and dry, no rash or erythema  Head: normocephalic and atraumatic  Eyes: pupils equal, round, and reactive to light, extraocular eye movements intact, conjunctivae normal  ENT: tympanic membrane, external ear and ear canal normal bilaterally, nose without deformity, nasal mucosa and turbinates normal without polyps  Neck: supple and non-tender without mass, no thyromegaly or thyroid nodules, no cervical lymphadenopathy  Pulmonary/Chest: clear to auscultation bilaterally- no wheezes, rales or rhonchi, normal air movement, no respiratory distress  Cardiovascular: normal rate, regular rhythm, normal S1 and S2, no murmurs, rubs, clicks, or gallops, distal pulses intact, no carotid bruits  Abdomen: soft, non-tender, non-distended, normal bowel sounds, no masses or organomegaly  Extremities: no cyanosis, clubbing or edema  Musculoskeletal: normal range of motion, no joint swelling, deformity or tenderness  Neurologic: reflexes normal and symmetric, no cranial nerve deficit, gait, coordination and speech normal    Patient's complete Health Risk Assessment and screening values have been reviewed and are found in Flowsheets. The following problems were reviewed today and where indicated follow up appointments were made and/or referrals ordered. Positive Risk Factor Screenings with Interventions:     Health Habits/Nutrition:     There is no height or weight on file to calculate BMI. Health Habits/Nutrition Interventions:  · none.     Personalized Preventive Plan   Current Health Maintenance Status  Immunization History   Administered Date(s) Administered    Hepatitis A Adult (Havrix, Vaqta) 02/17/2014    Influenza Virus Vaccine 10/10/2017    Influenza, High Dose (Fluzone 65 yrs and older) 10/07/2018    Influenza, Triv, inactivated, subunit, adjuvanted, IM (Fluad 65 yrs and older) 10/07/2018, 10/06/2019    Pneumococcal Conjugate 13-valent ([id number]) 10/02/2016, 10/10/2017, 10/06/2019    Pneumococcal Polysaccharide (Tppcpopek75) 10/14/2016, 10/13/2017    Tdap (Boostrix, Adacel) 12/01/2013    Typhoid Vi capsular polysaccharide (Typhim VI) 02/17/2014    Zoster Recombinant (Shingrix) 04/24/2018        Health Maintenance   Topic Date Due    Hepatitis C screen  1950    Diabetes screen  03/28/1991    Colon cancer screen colonoscopy  03/28/2001    Shingles Vaccine (2 of 2) 06/19/2018    Annual Wellness Visit (AWV)  05/29/2019    Flu vaccine (1) 09/01/2020    Potassium monitoring  10/10/2020    Creatinine monitoring  10/10/2020    TSH testing  09/09/2021    DTaP/Tdap/Td vaccine (2 - Td) 12/01/2023    Lipid screen  06/13/2025    Pneumococcal 65+ years Vaccine  Completed    AAA screen  Completed    Hepatitis A vaccine  Aged Out    Hepatitis B vaccine  Aged Out    Hib vaccine  Aged Out    Meningococcal (ACWY) vaccine  Aged Out     Recommendations for [place] Due: see orders and patient instructions/AVS.  . Recommended screening schedule for the next 5-10 years is provided to the patient in written form: see Patient Instructions/AVS.    Laine Bruce was seen today for medicare awv.     Diagnoses and all orders for this visit:    Routine general medical examination at a health care facility

## 2020-09-30 RX ORDER — APIXABAN 5 MG/1
TABLET, FILM COATED ORAL
Qty: 180 TABLET | Refills: 0 | Status: SHIPPED | OUTPATIENT
Start: 2020-09-30 | End: 2020-11-04 | Stop reason: SDUPTHER

## 2020-09-30 NOTE — TELEPHONE ENCOUNTER
Last visit- Visit date not found  Next visit- Visit date not found    Requested Prescriptions     Pending Prescriptions Disp Refills    ELIQUIS 5 MG TABS tablet [Pharmacy Med Name: Eliquis 5 MG Oral Tablet] 180 tablet 0     Sig: TAKE 1 TABLET BY MOUTH EVERY 12 HOURS

## 2020-11-03 RX ORDER — CLOPIDOGREL BISULFATE 75 MG/1
TABLET ORAL
Qty: 90 TABLET | Refills: 0 | Status: SHIPPED | OUTPATIENT
Start: 2020-11-03 | End: 2020-11-04 | Stop reason: SDUPTHER

## 2020-11-04 ENCOUNTER — OFFICE VISIT (OUTPATIENT)
Dept: CARDIOLOGY CLINIC | Age: 70
End: 2020-11-04
Payer: MEDICARE

## 2020-11-04 VITALS
HEART RATE: 68 BPM | DIASTOLIC BLOOD PRESSURE: 68 MMHG | HEIGHT: 70 IN | SYSTOLIC BLOOD PRESSURE: 130 MMHG | WEIGHT: 217.8 LBS | BODY MASS INDEX: 31.18 KG/M2

## 2020-11-04 PROCEDURE — 1123F ACP DISCUSS/DSCN MKR DOCD: CPT | Performed by: NUCLEAR MEDICINE

## 2020-11-04 PROCEDURE — G8484 FLU IMMUNIZE NO ADMIN: HCPCS | Performed by: NUCLEAR MEDICINE

## 2020-11-04 PROCEDURE — 1036F TOBACCO NON-USER: CPT | Performed by: NUCLEAR MEDICINE

## 2020-11-04 PROCEDURE — 99214 OFFICE O/P EST MOD 30 MIN: CPT | Performed by: NUCLEAR MEDICINE

## 2020-11-04 PROCEDURE — 93000 ELECTROCARDIOGRAM COMPLETE: CPT | Performed by: NUCLEAR MEDICINE

## 2020-11-04 PROCEDURE — 3017F COLORECTAL CA SCREEN DOC REV: CPT | Performed by: NUCLEAR MEDICINE

## 2020-11-04 PROCEDURE — G8427 DOCREV CUR MEDS BY ELIG CLIN: HCPCS | Performed by: NUCLEAR MEDICINE

## 2020-11-04 PROCEDURE — 4040F PNEUMOC VAC/ADMIN/RCVD: CPT | Performed by: NUCLEAR MEDICINE

## 2020-11-04 PROCEDURE — G8417 CALC BMI ABV UP PARAM F/U: HCPCS | Performed by: NUCLEAR MEDICINE

## 2020-11-04 RX ORDER — CLOPIDOGREL BISULFATE 75 MG/1
TABLET ORAL
Qty: 90 TABLET | Refills: 3 | Status: SHIPPED | OUTPATIENT
Start: 2020-11-04 | End: 2021-11-10

## 2020-11-04 NOTE — PROGRESS NOTES
Social History     Tobacco Use    Smoking status: Former Smoker     Packs/day: 2.00     Years: 20.00     Pack years: 40.00     Types: Cigarettes     Last attempt to quit: 1992     Years since quittin.4    Smokeless tobacco: Never Used   Substance Use Topics    Alcohol use: Yes     Comment: glass red wine daily      Current Outpatient Medications   Medication Sig Dispense Refill    clopidogrel (PLAVIX) 75 MG tablet Take 1 tablet by mouth once daily 90 tablet 0    ELIQUIS 5 MG TABS tablet TAKE 1 TABLET BY MOUTH EVERY 12 HOURS 180 tablet 0    valsartan-hydrochlorothiazide (DIOVAN-HCT) 160-12.5 MG per tablet Take 1 tablet by mouth 2 times daily 180 tablet 3    nitroGLYCERIN (NITROSTAT) 0.4 MG SL tablet DISSOLVE ONE TABLET UNDER THE TONGUE EVERY 5 MINUTES AS NEEDED FOR CHEST PAIN. DO NOT EXCEED A TOTAL OF 3 DOSES IN 15 MINUTES 25 tablet 3    tamsulosin (FLOMAX) 0.4 MG capsule Take 1 capsule by mouth nightly (Patient taking differently: Take 0.4 mg by mouth as needed ) 30 capsule 5    atenolol (TENORMIN) 25 MG tablet Take 1 tablet by mouth 2 times daily 180 tablet 3    diphenhydrAMINE-APAP, sleep, (TYLENOL PM EXTRA STRENGTH)  MG tablet Take 2 tablets by mouth nightly as needed for Sleep      Omeprazole (PRILOSEC PO) Take 10 mg by mouth daily       Multiple Vitamins-Minerals (THERAPEUTIC MULTIVITAMIN-MINERALS) tablet Take 1 tablet by mouth daily      Ascorbic Acid (VITAMIN C PO) Take 1,000 mg by mouth daily       Coenzyme Q10 100 MG CAPS Take 100 mg by mouth daily. No current facility-administered medications for this visit.       Allergies   Allergen Reactions    Crestor [Rosuvastatin]      Muscle aches    Tape Juan Manuel Jersey Tape] Rash     Blisters and red     Health Maintenance   Topic Date Due    Hepatitis C screen  1950    Diabetes screen  1991    Colon cancer screen colonoscopy  2001    Shingles Vaccine (2 of 2) 2018    Flu vaccine (1) 2020    Potassium monitoring  10/10/2020    Creatinine monitoring  10/10/2020    TSH testing  09/09/2021    Annual Wellness Visit (AWV)  09/24/2021    DTaP/Tdap/Td vaccine (2 - Td) 12/01/2023    Lipid screen  06/13/2025    Pneumococcal 65+ years Vaccine  Completed    AAA screen  Completed    Hepatitis A vaccine  Aged Out    Hepatitis B vaccine  Aged Out    Hib vaccine  Aged Out    Meningococcal (ACWY) vaccine  Aged Out       Subjective:  Review of Systems  General:   No fever, no chills, some more fatigue or weight loss  Pulmonary:    some dyspnea, no wheezing  Cardiac:    Denies recent chest pain,   GI:     No nausea or vomiting, no abdominal pain  Neuro:     No dizziness or light headedness,   Musculoskeletal:  No recent active issues  Extremities:   No edema, no obvious claudication       Objective:  Physical Exam  /68   Pulse 68   Ht 5' 10\" (1.778 m)   Wt 217 lb 12.8 oz (98.8 kg)   BMI 31.25 kg/m²   General:   Well developed, well nourished  Lungs:    Clear to auscultation  Heart:    Normal S1 S2, Slight murmur. no rubs, no gallops  Abdomen:   Soft, non tender, no organomegalies, positive bowel sounds  Extremities:   No edema, no cyanosis, good peripheral pulses  Neurological:   Awake, alert, oriented. No obvious focal deficits  Musculoskelatal:  No obvious deformities    Assessment:      Diagnosis Orders   1. Coronary artery disease involving native coronary artery of native heart without angina pectoris  EKG 12 Lead   2. Essential hypertension     3. PAD (peripheral artery disease) ([address]     4. Familial hypercholesterolemia     as above  ?? meds side effects   Statins issues  Issues with meds   ECG in office was done today. I reviewed the ECG. No acute findings      Plan:  No follow-ups on file.   We can try lower atenolol down  Monitor the BP  Discussed Repathsamuel  Has tried diet and different statins   Continue risk factor modification and medical management  Thank you for allowing me to participate in the care of your patient. Please don't hesitate to contact me regarding any further issues related to the patient care    Orders Placed:  Orders Placed This Encounter   Procedures    EKG 12 Lead     Order Specific Question:   Reason for Exam?     Answer: Other       Medications Prescribed:  No orders of the defined types were placed in this encounter. Discussed use, benefit, and side effects of prescribed medications. All patient questions answered. Pt voicedunderstanding. Instructed to continue current medications, diet and exercise. Continue risk factor modification and medical management. Patient agreed with treatment plan. Follow up as directed.     Electronically signedby Rebecca Ward MD on 11/4/2020 at 8:25 AM

## 2020-12-02 ENCOUNTER — HOSPITAL ENCOUNTER (OUTPATIENT)
Dept: CT IMAGING | Age: 70
Discharge: HOME | End: 2020-12-02
Payer: MEDICARE

## 2020-12-02 ENCOUNTER — HOSPITAL ENCOUNTER (OUTPATIENT)
Age: 70
Discharge: HOME | End: 2020-12-02
Payer: MEDICARE

## 2020-12-02 ENCOUNTER — TELEPHONE (OUTPATIENT)
Dept: FAMILY MEDICINE CLINIC | Age: 70
End: 2020-12-02

## 2020-12-02 LAB
ALBUMIN SERPL-MCNC: 4.4 G/DL (ref 3.5–5.1)
ALP BLD-CCNC: 54 U/L (ref 38–126)
ALT SERPL-CCNC: 30 U/L (ref 11–66)
AST SERPL-CCNC: 26 U/L (ref 5–40)
BILIRUB SERPL-MCNC: 1.2 MG/DL (ref 0.3–1.2)
BILIRUBIN DIRECT: < 0.2 MG/DL (ref 0–0.3)
CHOLESTEROL, FASTING: 281 MG/DL (ref 100–199)
HDLC SERPL-MCNC: 30 MG/DL
LDL CHOLESTEROL CALCULATED: ABNORMAL MG/DL
TOTAL PROTEIN: 7.5 G/DL (ref 6.1–8)
TRIGLYCERIDE, FASTING: 545 MG/DL (ref 0–199)

## 2020-12-02 PROCEDURE — 36415 COLL VENOUS BLD VENIPUNCTURE: CPT

## 2020-12-02 PROCEDURE — 80076 HEPATIC FUNCTION PANEL: CPT

## 2020-12-02 PROCEDURE — 80061 LIPID PANEL: CPT

## 2020-12-02 PROCEDURE — 70450 CT HEAD/BRAIN W/O DYE: CPT

## 2020-12-02 NOTE — TELEPHONE ENCOUNTER
The patient is scheduled for his stat hold and call CT scan in [place] and the patient is to go now to be scanned. Called and updated the patient, he voiced understanding.

## 2020-12-02 NOTE — TELEPHONE ENCOUNTER
I have ordered a CT of the head without contrast since this headache has to be assumed to be vascular in nature since there is confusion associated with it. He needs to get it done as soon as it can be scheduled. I will reorder it as a stat CT of the head.

## 2020-12-02 NOTE — TELEPHONE ENCOUNTER
The patient called in and stated that he started in last Thursday with a bad headache and chest pain, he stated that he then developed confusion and was not thinking clearly. He stated that he did not go to the ED he \"just kind of rode it out\". He stated that today his headache is just a intermittent dull ache. Denies chest pain or SOB. He states that his confusion is better today, he states that he just feels like he is in a \"fog\" and is having some memory issues like what keys to hit for a program he has used for years. He is wanting Dr. Steven Orozco opinion. Spoke with Pratik Alamo CNP and he stated that it was ok to send a telephone encounter. Please advise.

## 2020-12-03 NOTE — RESULT ENCOUNTER NOTE
CT of the head shows no intracranial hemorrhage or mass-effect from a cerebral lesion. This would suggest the headache was a vascular or migraine type headache. Should it recur, notify the office.

## 2020-12-04 ENCOUNTER — TELEPHONE (OUTPATIENT)
Dept: CARDIOLOGY CLINIC | Age: 70
End: 2020-12-04

## 2020-12-08 ENCOUNTER — TELEPHONE (OUTPATIENT)
Dept: CARDIOLOGY CLINIC | Age: 70
End: 2020-12-08

## 2021-01-06 NOTE — TELEPHONE ENCOUNTER
Pt previously approved for praluent. New insurance change 1-1-2021. Jyoti is new insurance and prefers [facility]. Is it ok to switch to [facility]?   Form in dr altamirano's box, needs signature then faxed to [phone]

## 2021-01-11 RX ORDER — ATENOLOL 25 MG/1
25 TABLET ORAL DAILY
Qty: 90 TABLET | Refills: 1 | Status: SHIPPED | OUTPATIENT
Start: 2021-01-11 | End: 2021-07-12

## 2021-01-18 ENCOUNTER — TELEPHONE (OUTPATIENT)
Dept: FAMILY MEDICINE CLINIC | Age: 71
End: 2021-01-18

## 2021-01-18 DIAGNOSIS — E03.9 ACQUIRED HYPOTHYROIDISM: Primary | ICD-10-CM

## 2021-01-18 RX ORDER — LEVOTHYROXINE SODIUM 100 MCG
100 TABLET ORAL DAILY
Qty: 30 TABLET | Refills: 5 | Status: SHIPPED | OUTPATIENT
Start: 2021-01-18 | End: 2021-08-27

## 2021-02-01 RX ORDER — NITROGLYCERIN 0.4 MG/1
TABLET SUBLINGUAL
Qty: 25 TABLET | Refills: 3 | Status: SHIPPED | OUTPATIENT
Start: 2021-02-01 | End: 2022-03-09

## 2021-02-01 NOTE — TELEPHONE ENCOUNTER
Kaela Givens called requesting a refill on the following medications:    ONLY HAS 3 TABLETS LEFT  Requested Prescriptions     Pending Prescriptions Disp Refills    nitroGLYCERIN (NITROSTAT) 0.4 MG SL tablet 25 tablet 3     Sig: up to max of 3 total doses. If no relief after 1 dose, call 911.      Pharmacy verified:  [facility] [address]      Date of last visit: 11-04-20  Date of next visit (if applicable): 8/3/5975

## 2021-02-06 ENCOUNTER — APPOINTMENT (OUTPATIENT)
Dept: GENERAL RADIOLOGY | Age: 71
DRG: 247 | End: 2021-02-06
Payer: MEDICARE

## 2021-02-06 ENCOUNTER — HOSPITAL ENCOUNTER (INPATIENT)
Age: 71
LOS: 3 days | Discharge: HOME | DRG: 247 | End: 2021-02-09
Attending: INTERNAL MEDICINE | Admitting: INTERNAL MEDICINE
Payer: MEDICARE

## 2021-02-06 DIAGNOSIS — I20.0 UNSTABLE ANGINA (HCC): Primary | ICD-10-CM

## 2021-02-06 PROBLEM — E78.5 HYPERLIPIDEMIA: Status: ACTIVE | Noted: 2020-03-25

## 2021-02-06 LAB
ANION GAP SERPL CALCULATED.3IONS-SCNC: 11 MEQ/L (ref 8–16)
APTT: 34.1 SECONDS (ref 22–38)
APTT: 34.2 SECONDS (ref 22–38)
BASOPHILS # BLD: 1 %
BASOPHILS ABSOLUTE: 0.1 THOU/MM3 (ref 0–0.1)
BUN BLDV-MCNC: 17 MG/DL (ref 7–22)
CALCIUM SERPL-MCNC: 9.1 MG/DL (ref 8.5–10.5)
CHLORIDE BLD-SCNC: 100 MEQ/L (ref 98–111)
CO2: 25 MEQ/L (ref 23–33)
CREAT SERPL-MCNC: 1 MG/DL (ref 0.4–1.2)
EKG ATRIAL RATE: 84 BPM
EKG P AXIS: 63 DEGREES
EKG P-R INTERVAL: 204 MS
EKG Q-T INTERVAL: 374 MS
EKG QRS DURATION: 80 MS
EKG QTC CALCULATION (BAZETT): 441 MS
EKG R AXIS: -8 DEGREES
EKG T AXIS: 46 DEGREES
EKG VENTRICULAR RATE: 84 BPM
EOSINOPHIL # BLD: 2.7 %
EOSINOPHILS ABSOLUTE: 0.2 THOU/MM3 (ref 0–0.4)
ERYTHROCYTE [DISTWIDTH] IN BLOOD BY AUTOMATED COUNT: 12.7 % (ref 11.5–14.5)
ERYTHROCYTE [DISTWIDTH] IN BLOOD BY AUTOMATED COUNT: 41.7 FL (ref 35–45)
GFR SERPL CREATININE-BSD FRML MDRD: 74 ML/MIN/1.73M2
GLUCOSE BLD-MCNC: 155 MG/DL (ref 70–108)
HCT VFR BLD CALC: 45.8 % (ref 42–52)
HEMOGLOBIN: 15.9 GM/DL (ref 14–18)
IMMATURE GRANS (ABS): 0.03 THOU/MM3 (ref 0–0.07)
IMMATURE GRANULOCYTES: 0.4 %
INR BLD: 1.19 (ref 0.85–1.13)
LYMPHOCYTES # BLD: 28 %
LYMPHOCYTES ABSOLUTE: 2 THOU/MM3 (ref 1–4.8)
MCH RBC QN AUTO: 31.2 PG (ref 26–33)
MCHC RBC AUTO-ENTMCNC: 34.7 GM/DL (ref 32.2–35.5)
MCV RBC AUTO: 90 FL (ref 80–94)
MONOCYTES # BLD: 9.2 %
MONOCYTES ABSOLUTE: 0.7 THOU/MM3 (ref 0.4–1.3)
NUCLEATED RED BLOOD CELLS: 0 /100 WBC
OSMOLALITY CALCULATION: 276.6 MOSMOL/KG (ref 275–300)
PLATELET # BLD: 158 THOU/MM3 (ref 130–400)
PMV BLD AUTO: 11 FL (ref 9.4–12.4)
POTASSIUM SERPL-SCNC: 3.8 MEQ/L (ref 3.5–5.2)
PRO-BNP: 44.5 PG/ML (ref 0–900)
RBC # BLD: 5.09 MILL/MM3 (ref 4.7–6.1)
SEG NEUTROPHILS: 58.7 %
SEGMENTED NEUTROPHILS ABSOLUTE COUNT: 4.3 THOU/MM3 (ref 1.8–7.7)
SODIUM BLD-SCNC: 136 MEQ/L (ref 135–145)
TROPONIN T: < 0.01 NG/ML
WBC # BLD: 7.3 THOU/MM3 (ref 4.8–10.8)

## 2021-02-06 PROCEDURE — 85025 COMPLETE CBC W/AUTO DIFF WBC: CPT

## 2021-02-06 PROCEDURE — 2060000000 HC ICU INTERMEDIATE R&B

## 2021-02-06 PROCEDURE — 6360000002 HC RX W HCPCS: Performed by: INTERNAL MEDICINE

## 2021-02-06 PROCEDURE — 93010 ELECTROCARDIOGRAM REPORT: CPT | Performed by: NUCLEAR MEDICINE

## 2021-02-06 PROCEDURE — 84484 ASSAY OF TROPONIN QUANT: CPT

## 2021-02-06 PROCEDURE — 2500000003 HC RX 250 WO HCPCS: Performed by: NURSE PRACTITIONER

## 2021-02-06 PROCEDURE — 85730 THROMBOPLASTIN TIME PARTIAL: CPT

## 2021-02-06 PROCEDURE — 36415 COLL VENOUS BLD VENIPUNCTURE: CPT

## 2021-02-06 PROCEDURE — 80048 BASIC METABOLIC PNL TOTAL CA: CPT

## 2021-02-06 PROCEDURE — 71045 X-RAY EXAM CHEST 1 VIEW: CPT

## 2021-02-06 PROCEDURE — 83880 ASSAY OF NATRIURETIC PEPTIDE: CPT

## 2021-02-06 PROCEDURE — 99284 EMERGENCY DEPT VISIT MOD MDM: CPT

## 2021-02-06 PROCEDURE — 99223 1ST HOSP IP/OBS HIGH 75: CPT | Performed by: INTERNAL MEDICINE

## 2021-02-06 PROCEDURE — 85610 PROTHROMBIN TIME: CPT

## 2021-02-06 PROCEDURE — 6370000000 HC RX 637 (ALT 250 FOR IP): Performed by: INTERNAL MEDICINE

## 2021-02-06 PROCEDURE — 93005 ELECTROCARDIOGRAM TRACING: CPT | Performed by: INTERNAL MEDICINE

## 2021-02-06 RX ORDER — ACETAMINOPHEN 650 MG/1
650 SUPPOSITORY RECTAL EVERY 6 HOURS PRN
Status: DISCONTINUED | OUTPATIENT
Start: 2021-02-06 | End: 2021-02-09 | Stop reason: HOSPADM

## 2021-02-06 RX ORDER — HEPARIN SODIUM 1000 [USP'U]/ML
2000 INJECTION, SOLUTION INTRAVENOUS; SUBCUTANEOUS PRN
Status: DISCONTINUED | OUTPATIENT
Start: 2021-02-06 | End: 2021-02-09 | Stop reason: ALTCHOICE

## 2021-02-06 RX ORDER — POLYETHYLENE GLYCOL 3350 17 G/17G
17 POWDER, FOR SOLUTION ORAL DAILY PRN
Status: DISCONTINUED | OUTPATIENT
Start: 2021-02-06 | End: 2021-02-09 | Stop reason: HOSPADM

## 2021-02-06 RX ORDER — PANTOPRAZOLE SODIUM 40 MG/1
40 TABLET, DELAYED RELEASE ORAL
Status: DISCONTINUED | OUTPATIENT
Start: 2021-02-07 | End: 2021-02-09 | Stop reason: HOSPADM

## 2021-02-06 RX ORDER — ACETAMINOPHEN 500 MG
1000 TABLET ORAL NIGHTLY PRN
Status: DISCONTINUED | OUTPATIENT
Start: 2021-02-06 | End: 2021-02-09 | Stop reason: HOSPADM

## 2021-02-06 RX ORDER — ASPIRIN 81 MG/1
81 TABLET, CHEWABLE ORAL DAILY
Status: DISCONTINUED | OUTPATIENT
Start: 2021-02-07 | End: 2021-02-08

## 2021-02-06 RX ORDER — ASPIRIN 81 MG/1
324 TABLET, CHEWABLE ORAL ONCE
Status: COMPLETED | OUTPATIENT
Start: 2021-02-06 | End: 2021-02-06

## 2021-02-06 RX ORDER — NITROGLYCERIN 20 MG/100ML
5-200 INJECTION INTRAVENOUS CONTINUOUS
Status: DISCONTINUED | OUTPATIENT
Start: 2021-02-06 | End: 2021-02-09 | Stop reason: HOSPADM

## 2021-02-06 RX ORDER — HEPARIN SODIUM 10000 [USP'U]/100ML
5-30 INJECTION, SOLUTION INTRAVENOUS CONTINUOUS
Status: DISCONTINUED | OUTPATIENT
Start: 2021-02-06 | End: 2021-02-09

## 2021-02-06 RX ORDER — TAMSULOSIN HYDROCHLORIDE 0.4 MG/1
0.4 CAPSULE ORAL PRN
Status: DISCONTINUED | OUTPATIENT
Start: 2021-02-06 | End: 2021-02-09 | Stop reason: HOSPADM

## 2021-02-06 RX ORDER — DIPHENHYDRAMINE HCL 25 MG
50 TABLET ORAL NIGHTLY PRN
Status: DISCONTINUED | OUTPATIENT
Start: 2021-02-06 | End: 2021-02-09 | Stop reason: HOSPADM

## 2021-02-06 RX ORDER — PROMETHAZINE HYDROCHLORIDE 25 MG/1
12.5 TABLET ORAL EVERY 6 HOURS PRN
Status: DISCONTINUED | OUTPATIENT
Start: 2021-02-06 | End: 2021-02-09 | Stop reason: HOSPADM

## 2021-02-06 RX ORDER — ATENOLOL 25 MG/1
25 TABLET ORAL DAILY
Status: DISCONTINUED | OUTPATIENT
Start: 2021-02-07 | End: 2021-02-09 | Stop reason: HOSPADM

## 2021-02-06 RX ORDER — ACETAMINOPHEN 325 MG/1
650 TABLET ORAL EVERY 6 HOURS PRN
Status: DISCONTINUED | OUTPATIENT
Start: 2021-02-06 | End: 2021-02-09 | Stop reason: HOSPADM

## 2021-02-06 RX ORDER — HEPARIN SODIUM 1000 [USP'U]/ML
4000 INJECTION, SOLUTION INTRAVENOUS; SUBCUTANEOUS PRN
Status: DISCONTINUED | OUTPATIENT
Start: 2021-02-06 | End: 2021-02-09 | Stop reason: ALTCHOICE

## 2021-02-06 RX ORDER — SODIUM CHLORIDE 0.9 % (FLUSH) 0.9 %
10 SYRINGE (ML) INJECTION PRN
Status: DISCONTINUED | OUTPATIENT
Start: 2021-02-06 | End: 2021-02-08 | Stop reason: SDUPTHER

## 2021-02-06 RX ORDER — OMEPRAZOLE 10 MG/1
10 CAPSULE, DELAYED RELEASE ORAL DAILY
Status: DISCONTINUED | OUTPATIENT
Start: 2021-02-06 | End: 2021-02-06 | Stop reason: CLARIF

## 2021-02-06 RX ORDER — ACETAMINOPHEN,DIPHENHYDRAMINE HCL 500; 25 MG/1; MG/1
2 TABLET, FILM COATED ORAL NIGHTLY PRN
Status: DISCONTINUED | OUTPATIENT
Start: 2021-02-06 | End: 2021-02-06 | Stop reason: CLARIF

## 2021-02-06 RX ORDER — ONDANSETRON 2 MG/ML
4 INJECTION INTRAMUSCULAR; INTRAVENOUS EVERY 6 HOURS PRN
Status: DISCONTINUED | OUTPATIENT
Start: 2021-02-06 | End: 2021-02-09 | Stop reason: HOSPADM

## 2021-02-06 RX ORDER — LEVOTHYROXINE SODIUM 0.1 MG/1
100 TABLET ORAL DAILY
Status: DISCONTINUED | OUTPATIENT
Start: 2021-02-07 | End: 2021-02-09 | Stop reason: HOSPADM

## 2021-02-06 RX ORDER — SODIUM CHLORIDE 0.9 % (FLUSH) 0.9 %
10 SYRINGE (ML) INJECTION EVERY 12 HOURS SCHEDULED
Status: DISCONTINUED | OUTPATIENT
Start: 2021-02-06 | End: 2021-02-08 | Stop reason: SDUPTHER

## 2021-02-06 RX ORDER — CLOPIDOGREL BISULFATE 75 MG/1
75 TABLET ORAL DAILY
Status: DISCONTINUED | OUTPATIENT
Start: 2021-02-06 | End: 2021-02-08

## 2021-02-06 RX ORDER — NITROGLYCERIN 0.4 MG/1
0.4 TABLET SUBLINGUAL EVERY 5 MIN PRN
Status: DISCONTINUED | OUTPATIENT
Start: 2021-02-06 | End: 2021-02-09 | Stop reason: HOSPADM

## 2021-02-06 RX ORDER — ATORVASTATIN CALCIUM 80 MG/1
80 TABLET, FILM COATED ORAL NIGHTLY
Status: DISCONTINUED | OUTPATIENT
Start: 2021-02-06 | End: 2021-02-09 | Stop reason: HOSPADM

## 2021-02-06 RX ORDER — HEPARIN SODIUM 1000 [USP'U]/ML
4000 INJECTION, SOLUTION INTRAVENOUS; SUBCUTANEOUS ONCE
Status: COMPLETED | OUTPATIENT
Start: 2021-02-06 | End: 2021-02-06

## 2021-02-06 RX ADMIN — HEPARIN SODIUM 4000 UNITS: 1000 INJECTION INTRAVENOUS; SUBCUTANEOUS at 20:04

## 2021-02-06 RX ADMIN — CLOPIDOGREL BISULFATE 75 MG: 75 TABLET ORAL at 21:41

## 2021-02-06 RX ADMIN — ATORVASTATIN CALCIUM 80 MG: 80 TABLET, FILM COATED ORAL at 23:13

## 2021-02-06 RX ADMIN — HEPARIN SODIUM 10 UNITS/KG/HR: 10000 INJECTION, SOLUTION INTRAVENOUS at 20:04

## 2021-02-06 RX ADMIN — ASPIRIN 324 MG: 81 TABLET, CHEWABLE ORAL at 13:43

## 2021-02-06 RX ADMIN — NITROGLYCERIN 5 MCG/MIN: 20 INJECTION INTRAVENOUS at 11:44

## 2021-02-06 ASSESSMENT — ENCOUNTER SYMPTOMS
CHEST TIGHTNESS: 0
DIARRHEA: 0
COUGH: 0
SHORTNESS OF BREATH: 0
COLOR CHANGE: 0
ABDOMINAL DISTENTION: 0
NAUSEA: 0
VOMITING: 0
BACK PAIN: 0
SORE THROAT: 0
RHINORRHEA: 0
ABDOMINAL PAIN: 0

## 2021-02-06 NOTE — ED TRIAGE NOTES
Pt presents to the ED with c/o midsternal chest pain that started yesterday. Pt reports he has been \"living on nitro the last two days. \" Pt reports the nitro relieves his chest pain. Pts cardiologist is Dr Jag Escoto. Pt reports he has a lng history of cardiac stents. Pt alert and oriented x4.

## 2021-02-06 NOTE — H&P
History & Physical        Patient:  Ole Gonzales  YOB: 1950    MRN: [id number]     Acct: [de-identified]    PCP: BASHIR Joya CNP    Date of Admission: 2/6/2021    Date of Service: Pt seen/examined on 02/06/21  and Admitted to Inpatient with expected LOS greater than two midnights due to medical therapy. Chief Complaint:   Chest pain      History Of Present Illness:    72 y.o. male who presented to [address] [place] [address] with chest pain like his usual angina; frequent in the past week and it awakened him last pm.  It generally responds to ntg. He cannot relate it to any particular activity. He came to ER and is being admitted with dx of unstable angina. His trop is normal and his ekg shows no acute change. He has a hx of multiple stents in the past.  He is an exsmoker. He has hyperlipemia. Cad runs in his family. He has  A hx of paroxysmal afib, is on eliquis    Past Medical History:          Diagnosis Date    Acquired hypothyroidism 2/21/2019    CAD (coronary artery disease)     Cancer ([facility] [address]     SKIN    DDD (degenerative disc disease), cervical 4/10/2017    GERD (gastroesophageal reflux disease)     Hyperlipidemia     Hypertension        Past Surgical History:          Procedure Laterality Date    CARDIAC CATHETERIZATION  08/20/2004    EF 60%    CARDIAC CATHETERIZATION  03/01/2003    EF 60%    CARDIOVASCULAR STRESS TEST  02/25/2010    EF 63%    CARDIOVASCULAR STRESS TEST  11/17/2006    EF 68%    CARDIOVASCULAR STRESS TEST  08/18/2005    EF 48%    CARDIOVASCULAR STRESS TEST  07/27/2004    EF 63%    CARDIOVASCULAR STRESS TEST  03/18/2003    EF 43%    DIAGNOSTIC CARDIAC CATH LAB PROCEDURE  08/18/2000    EF 70%    PTCA  07/19/2006    CYPHER STENT TO MID RCA    TRANSTHORACIC ECHOCARDIOGRAM  02/25/2010    EF 55-65%       Medications Prior to Admission:      Prior to Admission medications    Medication Sig Start Date End Date Taking?  Authorizing Provider nitroGLYCERIN (NITROSTAT) 0.4 MG SL tablet DISSOLVE ONE TABLET UNDER THE TONGUE EVERY 5 MINUTES AS NEEDED FOR CHEST PAIN. DO NOT EXCEED A TOTAL OF 3 DOSES IN 15 MINUTES 2/1/21  Yes Beatrice Monsalve MD   SYNTHROID 100 MCG tablet Take 1 tablet by mouth daily Take with water on an empty stomach- wait 30 minutes before eating or taking other meds. 1/18/21  Yes Tray Backbone, APRN - CNP   atenolol (TENORMIN) 25 MG tablet Take 1 tablet by mouth daily 1/11/21  Yes Beatrice Monsalve MD   clopidogrel (PLAVIX) 75 MG tablet Take 1 tablet by mouth once daily 11/4/20  Yes Beatrice Monsalve MD   apixaban (ELIQUIS) 5 MG TABS tablet TAKE 1 TABLET BY MOUTH EVERY 12 HOURS 11/4/20  Yes Beatrice Monsalve MD   valsartan-hydrochlorothiazide (DIOVAN-HCT) 160-12.5 MG per tablet Take 1 tablet by mouth 2 times daily 9/8/20  Yes Jessica Browning MD   Multiple Vitamins-Minerals (THERAPEUTIC MULTIVITAMIN-MINERALS) tablet Take 1 tablet by mouth daily   Yes Historical Provider, MD   Evolocumab 140 MG/ML SOAJ Inject 140 mg into the skin every 14 days 1/7/21   Beatrice Monsalve MD   tamsulosin (FLOMAX) 0.4 MG capsule Take 1 capsule by mouth nightly  Patient taking differently: Take 0.4 mg by mouth as needed  12/19/19   Jessica Browning MD   diphenhydrAMINE-APAP, sleep, (TYLENOL PM EXTRA STRENGTH)  MG tablet Take 2 tablets by mouth nightly as needed for Sleep    Historical Provider, MD   Omeprazole (PRILOSEC PO) Take 10 mg by mouth daily     Historical Provider, MD   Ascorbic Acid (VITAMIN C PO) Take 1,000 mg by mouth daily     Historical Provider, MD   Coenzyme Q10 100 MG CAPS Take 100 mg by mouth daily. Historical Provider, MD       Allergies:  Crestor [rosuvastatin] and Tape [adhesive tape]    Social History:      The patient currently lives with wife    TOBACCO:   reports that he quit smoking about 28 years ago. His smoking use included cigarettes. He has a 40.00 pack-year smoking history.  He has never used smokeless tobacco.  ETOH:   reports current alcohol use. Family History:       Positive as follows:        Problem Relation Age of Onset    High Blood Pressure Mother     High Blood Pressure Father     Diabetes Sister     Heart Disease Maternal Grandmother     High Blood Pressure Maternal Grandmother     High Blood Pressure Maternal Grandfather     High Blood Pressure Paternal Grandmother     High Blood Pressure Paternal Grandfather     Diabetes Brother        Diet:  No diet orders on file    REVIEW OF SYSTEMS:   Pertinent positives as noted in the HPI. All other systems reviewed and negative. PHYSICAL EXAM:    BP (!) 140/71   Pulse 73   Temp 97.9 °F (36.6 °C) (Oral)   Resp 18   Ht 5' 9\" (1.753 m)   Wt 220 lb (99.8 kg)   SpO2 97%   BMI 32.49 kg/m²     General appearance:  No apparent distress, appears stated age and cooperative. HEENT:  Normal cephalic, atraumatic without obvious deformity. Pupils equal, round, and reactive to light. Extra ocular muscles intact. Conjunctivae/corneas clear. Neck: Supple, with full range of motion. RIGHT CAROTID BRUIT  Respiratory:  Normal respiratory effort. Clear to auscultation, bilaterally without Rales/Wheezes/Rhonchi. Cardiovascular:  Regular rate and rhythm with normal S1/S2 without murmurs, rubs or gallops. Abdomen: Soft, non-tender, non-distended with normal bowel sounds. Musculoskeletal:  No clubbing, cyanosis or edema bilaterally. Full range of motion without deformity. Skin: Skin color, texture, turgor normal.  No rashes or lesions. Neurologic:  Neurovascularly intact without any focal sensory/motor deficits.  Cranial nerves: II-XII intact, grossly non-focal.  Psychiatric:  Alert and oriented, thought content appropriate, normal insight  Capillary Refill: Brisk,< 3 seconds   Peripheral Pulses: +2 palpable, equal bilaterally       Labs:     Recent Labs     02/06/21  1139   WBC 7.3   HGB 15.9   HCT 45.8        Recent Labs     02/06/21  1139   NA 136   K 3.8      CO2 25   BUN 17   CREATININE 1.0   CALCIUM 9.1     No results for input(s): AST, ALT, BILIDIR, BILITOT, ALKPHOS in the last 72 hours. Recent Labs     02/06/21  1139   INR 1.19*     No results for input(s): Nallely Jorge in the last 72 hours. Urinalysis:      Lab Results   Component Value Date    NITRU Negative 07/02/2019    BLOODU Negative 07/02/2019    BLOODU NEGATIVE 06/19/2019    SPECGRAV 1.015 05/15/2019    GLUCOSEU Negative 07/02/2019       Intake & Output:  No intake/output data recorded. No intake/output data recorded. Radiology:     CXR: I have reviewed the CXR with the following interpretation: no acute process  EKG:  I have reviewed the EKG with the following interpretation: nsr, no acute change    XR CHEST PORTABLE   Final Result      No acute intrathoracic process. **This report has been created using voice recognition software. It may contain minor errors which are inherent in voice recognition technology. **      Final report electronically signed by Dr. Jorge L Gastelum on 2/6/2021 12:05 PM           DVT prophylaxis: IV heparin  Code Status: Prior          [facility] Problems    Diagnosis Date Noted    [facility] [I20.0] 02/28/2018     Priority: High       PLAN:    1. Unstable angina; heparin, ntg drip, consult cardiology        Thank you BASHIR Elizabeth CNP for the opportunity to be involved in this patient's care.     Electronically signed by Socrates Cotton MD on 2/6/2021 at 1:48 PM

## 2021-02-06 NOTE — ED PROVIDER NOTES
[facility] Emergency Department    CHIEF COMPLAINT       Chief Complaint   Patient presents with    Chest Pain       Nurses Notes reviewed and I agree except as noted in the HPI. HISTORY OF PRESENT ILLNESS    Geremias Rogers carlito 72 y.o. male who presents to the ED for evaluation of chest pain. Patient states that he's had 2 episodes of chest pain last night and 2 more episodes of chest pain at 7:30am and 9:30am today. He took SL nitro with each episode and reports that pain was completely resolved within 2 minutes of each occurrence. He states that the pain is dull and radiates to his jaw. Patient rated pain 5/10 prior to nitro, and 0/10 after nitro administration. He denies any history of MI, but has had several stents placed. He denies any back pain, headache, diaphoresis, SOB, abdominal pain, nausea, or vomiting. Patient states that he is not currently experiencing any pain. HPI was provided by the patient. REVIEW OF SYSTEMS     Review of Systems   Constitutional: Negative for appetite change, chills, diaphoresis, fatigue and fever. HENT: Negative for congestion, rhinorrhea and sore throat. Respiratory: Negative for cough, chest tightness and shortness of breath. Cardiovascular: Positive for chest pain. Negative for leg swelling. Gastrointestinal: Negative for abdominal distention, abdominal pain, diarrhea, nausea and vomiting. Genitourinary: Negative for difficulty urinating, dysuria and flank pain. Musculoskeletal: Negative for arthralgias, back pain, myalgias and neck pain. Skin: Negative for color change, pallor and rash. Neurological: Negative for dizziness, speech difficulty, light-headedness and headaches. Psychiatric/Behavioral: Negative for agitation, behavioral problems and confusion.         PAST MEDICAL HISTORY     Past Medical History:   Diagnosis Date    Acquired hypothyroidism 2/21/2019    CAD (coronary artery disease)     Cancer ([facility] [address]     SKIN    DDD (degenerative disc disease), cervical 4/10/2017    GERD (gastroesophageal reflux disease)     Hyperlipidemia     Hypertension        SURGICALHISTORY      has a past surgical history that includes transthoracic echocardiogram (02/25/2010); cardiovascular stress test (02/25/2010); cardiovascular stress test (11/17/2006); cardiovascular stress test (08/18/2005); cardiovascular stress test (07/27/2004); cardiovascular stress test (03/18/2003); Cardiac catheterization (08/20/2004); Percutaneous Transluminal Coronary Angio (07/19/2006); Cardiac catheterization (03/01/2003); and Diagnostic Cardiac Cath Lab Procedure (08/18/2000). CURRENT MEDICATIONS       Current Discharge Medication List      CONTINUE these medications which have NOT CHANGED    Details   nitroGLYCERIN (NITROSTAT) 0.4 MG SL tablet DISSOLVE ONE TABLET UNDER THE TONGUE EVERY 5 MINUTES AS NEEDED FOR CHEST PAIN. DO NOT EXCEED A TOTAL OF 3 DOSES IN 15 MINUTES  Qty: 25 tablet, Refills: 3      SYNTHROID 100 MCG tablet Take 1 tablet by mouth daily Take with water on an empty stomach- wait 30 minutes before eating or taking other meds.   Qty: 30 tablet, Refills: 5    Associated Diagnoses: Acquired hypothyroidism      atenolol (TENORMIN) 25 MG tablet Take 1 tablet by mouth daily  Qty: 90 tablet, Refills: 1      Evolocumab 140 MG/ML SOAJ Inject 140 mg into the skin every 14 days  Qty: 6 pen, Refills: 3    Comments: Fax to [phone]      clopidogrel (PLAVIX) 75 MG tablet Take 1 tablet by mouth once daily  Qty: 90 tablet, Refills: 3      apixaban (ELIQUIS) 5 MG TABS tablet TAKE 1 TABLET BY MOUTH EVERY 12 HOURS  Qty: 180 tablet, Refills: 3      valsartan-hydrochlorothiazide (DIOVAN-HCT) 160-12.5 MG per tablet Take 1 tablet by mouth 2 times daily  Qty: 180 tablet, Refills: 3    Associated Diagnoses: Essential hypertension      Multiple Vitamins-Minerals (THERAPEUTIC MULTIVITAMIN-MINERALS) tablet Take 1 tablet by mouth daily      Ascorbic Acid (VITAMIN C PO) Take 1,000 mg by mouth daily       Coenzyme Q10 100 MG CAPS Take 100 mg by mouth daily. tamsulosin (FLOMAX) 0.4 MG capsule Take 1 capsule by mouth nightly  Qty: 30 capsule, Refills: 5    Associated Diagnoses: Hypertrophy of prostate with urinary obstruction      diphenhydrAMINE-APAP, sleep, (TYLENOL PM EXTRA STRENGTH)  MG tablet Take 2 tablets by mouth nightly as needed for Sleep      Omeprazole (PRILOSEC PO) Take 10 mg by mouth daily              ALLERGIES     is allergic to crestor [rosuvastatin] and tape [adhesive tape]. FAMILY HISTORY     He indicated that his mother is . He indicated that his father is . He indicated that his sister is alive. He indicated that only one of his two brothers is alive. He indicated that the status of his maternal grandmother is unknown. He indicated that the status of his maternal grandfather is unknown. He indicated that the status of his paternal grandmother is unknown. He indicated that the status of his paternal grandfather is unknown.   family history includes Diabetes in his brother and sister; Heart Disease in his maternal grandmother; High Blood Pressure in his father, maternal grandfather, maternal grandmother, mother, paternal grandfather, and paternal grandmother.     SOCIAL HISTORY       Social History     Socioeconomic History    Marital status:      Spouse name: Whitman Blizzard Number of children: 2    Years of education: Not on file    Highest education level: Not on file   Occupational History    Not on file   Social Needs    Financial resource strain: Not on file    Food insecurity     Worry: Not on file     Inability: Not on file   [employer] needs     Medical: Not on file     Non-medical: Not on file   Tobacco Use    Smoking status: Former Smoker     Packs/day: 2.00     Years: 20.00     Pack years: 40.00     Types: Cigarettes     Quit date: 1992     Years since quittin.7    Smokeless tobacco: Never Used Substance and Sexual Activity    Alcohol use: Yes     Comment: glass red wine daily    Drug use: No    Sexual activity: Not on file   Lifestyle    Physical activity     Days per week: Not on file     Minutes per session: Not on file    Stress: Not on file   Relationships    Social connections     Talks on phone: Not on file     Gets together: Not on file     Attends Church service: Not on file     Active member of club or organization: Not on file     Attends meetings of clubs or organizations: Not on file     Relationship status: Not on file    Intimate partner violence     Fear of current or ex partner: Not on file     Emotionally abused: Not on file     Physically abused: Not on file     Forced sexual activity: Not on file   Other Topics Concern    Not on file   Social History Narrative    Not on file       PHYSICAL EXAM     INITIAL VITALS:  height is 5' 9\" (1.753 m) and weight is 220 lb (99.8 kg). His oral temperature is 97.9 °F (36.6 °C). His blood pressure is 139/86 and his pulse is 62. His respiration is 18 and oxygen saturation is 96%. Physical Exam  Vitals signs and nursing note reviewed. Constitutional:       General: He is not in acute distress. Appearance: Normal appearance. He is not toxic-appearing or diaphoretic. HENT:      Head: Normocephalic and atraumatic. Nose: Nose normal.   Eyes:      Conjunctiva/sclera: Conjunctivae normal.      Pupils: Pupils are equal, round, and reactive to light. Neck:      Musculoskeletal: Normal range of motion. Cardiovascular:      Rate and Rhythm: Normal rate and regular rhythm. Heart sounds: Normal heart sounds. Pulmonary:      Effort: Pulmonary effort is normal. No respiratory distress. Breath sounds: Normal breath sounds. Chest:      Chest wall: No tenderness. Abdominal:      General: There is no distension. Palpations: Abdomen is soft. Tenderness: There is no abdominal tenderness.  There is no guarding or rebound. Musculoskeletal: Normal range of motion. Skin:     General: Skin is warm and dry. Coloration: Skin is not pale. Findings: No erythema or rash. Neurological:      General: No focal deficit present. Mental Status: He is alert. Psychiatric:         Mood and Affect: Mood normal.         Behavior: Behavior normal.         Thought Content: Thought content normal.         DIFFERENTIAL DIAGNOSIS:   Unstable angina, acute MI, AAA, GERD     DIAGNOSTIC RESULTS     EKG: All EKG's are interpreted by the Emergency Department Physician who eithersigns or Co-signs this chart in the absence of a cardiologist.    EKG read and interpreted by myself with comparison to October 10, 2019 gives impression of normal sinus rhythm with occasional PAC heart rate of 84; interval 204; QRS 80;QTc 441; axis P-63, R-negative A, T-46. RADIOLOGY: non-plainfilm images(s) such as CT, Ultrasound and MRI are read by the radiologist.  Plain radiographic images are visualized and preliminarily interpreted by the emergency physician unless otherwise stated below. XR CHEST PORTABLE   Final Result      No acute intrathoracic process. **This report has been created using voice recognition software. It may contain minor errors which are inherent in voice recognition technology. **      Final report electronically signed by Dr. Jenelle Posadas on 2/6/2021 12:05 PM            LABS:   Labs Reviewed   BASIC METABOLIC PANEL - Abnormal; Notable for the following components:       Result Value    Glucose 155 (*)     All other components within normal limits   PROTIME-INR - Abnormal; Notable for the following components:    INR 1.19 (*)     All other components within normal limits   GLOMERULAR FILTRATION RATE, ESTIMATED - Abnormal; Notable for the following components:    Est, Glom Filt Rate 74 (*)     All other components within normal limits   CBC WITH AUTO DIFFERENTIAL   BRAIN NATRIURETIC PEPTIDE   TROPONIN   APTT ANION GAP   OSMOLALITY   TROPONIN   APTT   TROPONIN   APTT   APTT       EMERGENCY DEPARTMENT COURSE:   Vitals:    Vitals:    02/06/21 1345 02/06/21 1428 02/06/21 1450 02/06/21 1500   BP: (!) 140/71 127/69 127/68 139/86   Pulse: 73 67 62 62   Resp: 18 18 18 18   Temp:    97.9 °F (36.6 °C)   TempSrc:    Oral   SpO2: 97% 98% 96% 96%   Weight:       Height:         MDM    Patient was seen and evaluated in the emergency department, patient appeared to be in no acute distress, vital signs reviewed, no significant findings noted. EKG reviewed, no significant findings noted. Labs were obtained, no significant elevation in troponin. Chest x-ray reveals no significant findings. Patient is having frequent chest pain at rest, is a history of coronary artery disease. He is requesting observation I think this is appropriate. Discussed the case with Dr. Rolo Villarreal who graciously excepts patient for admission.   Medications   nitroGLYCERIN 50 mg in dextrose 5% 250 mL infusion (5 mcg/min Intravenous New Bag 2/6/21 1144)   atenolol (TENORMIN) tablet 25 mg (has no administration in time range)   clopidogrel (PLAVIX) tablet 75 mg (has no administration in time range)   nitroGLYCERIN (NITROSTAT) SL tablet 0.4 mg (has no administration in time range)   levothyroxine (SYNTHROID) tablet 100 mcg (has no administration in time range)   tamsulosin (FLOMAX) capsule 0.4 mg (has no administration in time range)   sodium chloride flush 0.9 % injection 10 mL (has no administration in time range)   sodium chloride flush 0.9 % injection 10 mL (has no administration in time range)   promethazine (PHENERGAN) tablet 12.5 mg (has no administration in time range)     Or   ondansetron (ZOFRAN) injection 4 mg (has no administration in time range)   acetaminophen (TYLENOL) tablet 650 mg (has no administration in time range)     Or   acetaminophen (TYLENOL) suppository 650 mg (has no administration in time range)   polyethylene glycol (GLYCOLAX) packet 17 g (has no administration in time range)   aspirin chewable tablet 81 mg (has no administration in time range)   atorvastatin (LIPITOR) tablet 80 mg (has no administration in time range)   pantoprazole (PROTONIX) tablet 40 mg (has no administration in time range)   acetaminophen (TYLENOL) tablet 1,000 mg (has no administration in time range)     And   diphenhydrAMINE (BENADRYL) tablet 50 mg (has no administration in time range)   heparin (porcine) injection 4,000 Units (has no administration in time range)   heparin (porcine) injection 4,000 Units (has no administration in time range)   heparin (porcine) injection 2,000 Units (has no administration in time range)   heparin 25,000 units in dextrose 5% 250 mL (premix) infusion (has no administration in time range)   aspirin chewable tablet 324 mg (324 mg Oral Given 2/6/21 1343)       Patient was seenindependently by myself. The patient's final impression and disposition and plan was determined by myself. CRITICAL CARE:   None    CONSULTS:  None    PROCEDURES:  None    FINAL IMPRESSION     1. Unstable angina [facility])          DISPOSITION/PLAN   Patient admitted to the hospital service  PATIENT REFERREDTO:  No follow-up provider specified. DISCHARGE MEDICATIONS:  Current Discharge Medication List          (Please note that portions of this note were completed with a voice recognition program.  Efforts were made to edit the dictations but occasionally words are mis-transcribed.)    Provider:  I personally performed the services described in the documentation,reviewed and edited the documentation which was dictated to the scribe in my presence, and it accurately records my words and actions.     Luke Rivera CNP 02/06/21 5:06 PM    Tyler Rivera, APRN - CNP          [place], APRCHAVO - CNP  02/06/21 1451

## 2021-02-06 NOTE — ED NOTES
Patient transferred to [facility] in stable condition.      Frederik Denver, RN  02/06/21 1594

## 2021-02-07 LAB
ABO: NORMAL
ANTIBODY SCREEN: NORMAL
APTT: 55.7 SECONDS (ref 22–38)
APTT: 62.2 SECONDS (ref 22–38)
APTT: 65.8 SECONDS (ref 22–38)
CHOLESTEROL, TOTAL: 184 MG/DL (ref 100–199)
EKG ATRIAL RATE: 60 BPM
EKG P AXIS: 45 DEGREES
EKG P-R INTERVAL: 204 MS
EKG Q-T INTERVAL: 442 MS
EKG QRS DURATION: 80 MS
EKG QTC CALCULATION (BAZETT): 442 MS
EKG R AXIS: -24 DEGREES
EKG T AXIS: 8 DEGREES
EKG VENTRICULAR RATE: 60 BPM
ERYTHROCYTE [DISTWIDTH] IN BLOOD BY AUTOMATED COUNT: 12.9 % (ref 11.5–14.5)
ERYTHROCYTE [DISTWIDTH] IN BLOOD BY AUTOMATED COUNT: 42.9 FL (ref 35–45)
HCT VFR BLD CALC: 46.6 % (ref 42–52)
HDLC SERPL-MCNC: 32 MG/DL
HEMOGLOBIN: 15.9 GM/DL (ref 14–18)
LDL CHOLESTEROL CALCULATED: ABNORMAL MG/DL
MCH RBC QN AUTO: 31.1 PG (ref 26–33)
MCHC RBC AUTO-ENTMCNC: 34.1 GM/DL (ref 32.2–35.5)
MCV RBC AUTO: 91 FL (ref 80–94)
PLATELET # BLD: 143 THOU/MM3 (ref 130–400)
PMV BLD AUTO: 11.1 FL (ref 9.4–12.4)
RBC # BLD: 5.12 MILL/MM3 (ref 4.7–6.1)
RH FACTOR: NORMAL
T4 FREE: 1.49 NG/DL (ref 0.93–1.76)
TRIGL SERPL-MCNC: 555 MG/DL (ref 0–199)
WBC # BLD: 7.8 THOU/MM3 (ref 4.8–10.8)

## 2021-02-07 PROCEDURE — 36415 COLL VENOUS BLD VENIPUNCTURE: CPT

## 2021-02-07 PROCEDURE — 85730 THROMBOPLASTIN TIME PARTIAL: CPT

## 2021-02-07 PROCEDURE — 99232 SBSQ HOSP IP/OBS MODERATE 35: CPT | Performed by: PHYSICIAN ASSISTANT

## 2021-02-07 PROCEDURE — 86850 RBC ANTIBODY SCREEN: CPT

## 2021-02-07 PROCEDURE — 6370000000 HC RX 637 (ALT 250 FOR IP): Performed by: INTERNAL MEDICINE

## 2021-02-07 PROCEDURE — 86900 BLOOD TYPING SEROLOGIC ABO: CPT

## 2021-02-07 PROCEDURE — 85027 COMPLETE CBC AUTOMATED: CPT

## 2021-02-07 PROCEDURE — 93005 ELECTROCARDIOGRAM TRACING: CPT | Performed by: INTERNAL MEDICINE

## 2021-02-07 PROCEDURE — 80061 LIPID PANEL: CPT

## 2021-02-07 PROCEDURE — 6360000002 HC RX W HCPCS: Performed by: INTERNAL MEDICINE

## 2021-02-07 PROCEDURE — 93010 ELECTROCARDIOGRAM REPORT: CPT | Performed by: NUCLEAR MEDICINE

## 2021-02-07 PROCEDURE — 86901 BLOOD TYPING SEROLOGIC RH(D): CPT

## 2021-02-07 PROCEDURE — 2060000000 HC ICU INTERMEDIATE R&B

## 2021-02-07 PROCEDURE — 99223 1ST HOSP IP/OBS HIGH 75: CPT | Performed by: INTERNAL MEDICINE

## 2021-02-07 PROCEDURE — 84439 ASSAY OF FREE THYROXINE: CPT

## 2021-02-07 RX ADMIN — ATORVASTATIN CALCIUM 80 MG: 80 TABLET, FILM COATED ORAL at 20:09

## 2021-02-07 RX ADMIN — LEVOTHYROXINE SODIUM 100 MCG: 100 TABLET ORAL at 06:29

## 2021-02-07 RX ADMIN — PANTOPRAZOLE SODIUM 40 MG: 40 TABLET, DELAYED RELEASE ORAL at 06:29

## 2021-02-07 RX ADMIN — HEPARIN SODIUM 12 UNITS/KG/HR: 10000 INJECTION, SOLUTION INTRAVENOUS at 16:50

## 2021-02-07 RX ADMIN — ATENOLOL 25 MG: 25 TABLET ORAL at 10:01

## 2021-02-07 RX ADMIN — ASPIRIN 81 MG: 81 TABLET, CHEWABLE ORAL at 10:01

## 2021-02-07 RX ADMIN — CLOPIDOGREL BISULFATE 75 MG: 75 TABLET ORAL at 10:01

## 2021-02-07 ASSESSMENT — PAIN DESCRIPTION - ORIENTATION: ORIENTATION: LEFT

## 2021-02-07 ASSESSMENT — PAIN SCALES - GENERAL
PAINLEVEL_OUTOF10: 0
PAINLEVEL_OUTOF10: 2
PAINLEVEL_OUTOF10: 0

## 2021-02-07 ASSESSMENT — PAIN DESCRIPTION - FREQUENCY: FREQUENCY: CONTINUOUS

## 2021-02-07 ASSESSMENT — PAIN DESCRIPTION - PROGRESSION: CLINICAL_PROGRESSION: NOT CHANGED

## 2021-02-07 ASSESSMENT — PAIN DESCRIPTION - PAIN TYPE: TYPE: ACUTE PAIN

## 2021-02-07 NOTE — CONSULTS
[facility]  Cardiology Consult        Patient:  Azul Jansen  YOB: 1950  MRN: [id number]     Acct: [de-identified]    PCP: BASHIR Solano CNP    Date of Admission: 2/6/2021      Reason for Consultation:  Chest pain      History Of Present Illness:    72 y.o. pleasant male c hx of CAD s/p PCI, HTN, HLD who presented to the hospital with complaints of chest pain. The chest pain was midsternal, pressure like, non radiating, no aggravating factors, occurred while sitting down, alleviated by NTG. EKG shows SR with PVC, Echo from 10/2019 showed EF 65%, mild cLVH, mild AR, cath from 12/2017 showed patient underwent Cutting balloon angioplasty of mid LAD. Trops x 3 negative. Past Medical History:          Diagnosis Date    Acquired hypothyroidism 2/21/2019    CAD (coronary artery disease)     Cancer ([facility] [address]     SKIN    DDD (degenerative disc disease), cervical 4/10/2017    GERD (gastroesophageal reflux disease)     Hyperlipidemia     Hypertension        Past Surgical History:          Procedure Laterality Date    CARDIAC CATHETERIZATION  08/20/2004    EF 60%    CARDIAC CATHETERIZATION  03/01/2003    EF 60%    CARDIOVASCULAR STRESS TEST  02/25/2010    EF 63%    CARDIOVASCULAR STRESS TEST  11/17/2006    EF 68%    CARDIOVASCULAR STRESS TEST  08/18/2005    EF 48%    CARDIOVASCULAR STRESS TEST  07/27/2004    EF 63%    CARDIOVASCULAR STRESS TEST  03/18/2003    EF 43%    DIAGNOSTIC CARDIAC CATH LAB PROCEDURE  08/18/2000    EF 70%    PTCA  07/19/2006    CYPHER STENT TO MID RCA    TRANSTHORACIC ECHOCARDIOGRAM  02/25/2010    EF 55-65%       Medications Prior to Admission:      Prior to Admission medications    Medication Sig Start Date End Date Taking? Authorizing Provider   nitroGLYCERIN (NITROSTAT) 0.4 MG SL tablet DISSOLVE ONE TABLET UNDER THE TONGUE EVERY 5 MINUTES AS NEEDED FOR CHEST PAIN.   DO NOT EXCEED A TOTAL OF 3 DOSES IN 15 MINUTES 2/1/21  Yes Megan Barnard MD   SYNTHROID 100 MCG tablet Take 1 tablet by mouth daily Take with water on an empty stomach- wait 30 minutes before eating or taking other meds. 1/18/21  Yes BASHIR Brown - CNP   atenolol (TENORMIN) 25 MG tablet Take 1 tablet by mouth daily 1/11/21  Yes Megan Barnard MD   Evolocumab 140 MG/ML SOAJ Inject 140 mg into the skin every 14 days 1/7/21  Yes Megan Barnard MD   clopidogrel (PLAVIX) 75 MG tablet Take 1 tablet by mouth once daily 11/4/20  Yes Megan Barnard MD   apixaban (ELIQUIS) 5 MG TABS tablet TAKE 1 TABLET BY MOUTH EVERY 12 HOURS 11/4/20  Yes Megan Barnard MD   valsartan-hydrochlorothiazide (DIOVAN-HCT) 160-12.5 MG per tablet Take 1 tablet by mouth 2 times daily 9/8/20  Yes Felix Harrison MD   Multiple Vitamins-Minerals (THERAPEUTIC MULTIVITAMIN-MINERALS) tablet Take 1 tablet by mouth daily   Yes Historical Provider, MD   Ascorbic Acid (VITAMIN C PO) Take 1,000 mg by mouth daily    Yes Historical Provider, MD   Coenzyme Q10 100 MG CAPS Take 100 mg by mouth daily.      Yes Historical Provider, MD   tamsulosin (FLOMAX) 0.4 MG capsule Take 1 capsule by mouth nightly  Patient taking differently: Take 0.4 mg by mouth as needed  12/19/19   Felix Harrison MD   diphenhydrAMINE-APAP, sleep, (TYLENOL PM EXTRA STRENGTH)  MG tablet Take 2 tablets by mouth nightly as needed for Sleep    Historical Provider, MD   Omeprazole (PRILOSEC PO) Take 10 mg by mouth daily     Historical Provider, MD       Current Facility-Administered Medications   Medication Dose Route Frequency Provider Last Rate Last Admin    nitroGLYCERIN 50 mg in dextrose 5% 250 mL infusion  5-200 mcg/min Intravenous Continuous Carlos Garcia MD 1.5 mL/hr at 02/06/21 1144 5 mcg/min at 02/06/21 1144    atenolol (TENORMIN) tablet 25 mg  25 mg Oral Daily Carlos Garcia MD        clopidogrel (PLAVIX) tablet 75 mg  75 mg Oral Daily Carlos Garcia MD   75 mg at 02/06/21 2141    nitroGLYCERIN (NITROSTAT) SL tablet 0.4 mg  0.4 mg Sublingual Q5 Min PRN Haydee Farmer MD        levothyroxine (SYNTHROID) tablet 100 mcg  100 mcg Oral Daily Haydee Farmer MD   100 mcg at 02/07/21 9957    tamsulosin (FLOMAX) capsule 0.4 mg  0.4 mg Oral PRN Haydee Farmer MD        sodium chloride flush 0.9 % injection 10 mL  10 mL Intravenous 2 times per day Haydee Farmer MD        sodium chloride flush 0.9 % injection 10 mL  10 mL Intravenous PRN Haydee Farmer MD        promethazine (PHENERGAN) tablet 12.5 mg  12.5 mg Oral Q6H PRN Haydee Farmer MD        Or    ondansetron [facility]) injection 4 mg  4 mg Intravenous Q6H PRN Haydee Farmer MD        acetaminophen (TYLENOL) tablet 650 mg  650 mg Oral Q6H PRN Haydee Farmer MD        Or    acetaminophen (TYLENOL) suppository 650 mg  650 mg Rectal Q6H PRN Haydee Farmer MD        polyethylene glycol [facility]) packet 17 g  17 g Oral Daily PRN Haydee Farmer MD        aspirin chewable tablet 81 mg  81 mg Oral Daily Haydee Farmer MD        atorvastatin (LIPITOR) tablet 80 mg  80 mg Oral Nightly Haydee Farmer MD   80 mg at 02/06/21 2313    pantoprazole (PROTONIX) tablet 40 mg  40 mg Oral QAM AC Haydee Farmer MD   40 mg at 02/07/21 7308    acetaminophen (TYLENOL) tablet 1,000 mg  1,000 mg Oral Nightly PRN Haydee Farmer MD        And    diphenhydrAMINE (BENADRYL) tablet 50 mg  50 mg Oral Nightly PRCHAVO Farmer MD        heparin (porcine) injection 4,000 Units  4,000 Units Intravenous PRCHAVO Farmer MD        heparin (porcine) injection 2,000 Units  2,000 Units Intravenous PRCHAVO Farmer MD        heparin 25,000 units in dextrose 5% 250 mL (premix) infusion  5-30 Units/kg/hr Intravenous Continuous Haydee Farmer MD 10 mL/hr at 02/07/21 0300 10 Units/kg/hr at 02/07/21 0300       Allergies:  Crestor [rosuvastatin] and Tape [adhesive tape]    Social History:    TOBACCO:   reports that he quit smoking about 28 years ago. His smoking use included cigarettes. He has a 40.00 pack-year smoking history. He has never used smokeless tobacco.  ETOH:   reports current alcohol use. Family History:        Problem Relation Age of Onset    High Blood Pressure Mother     High Blood Pressure Father     Diabetes Sister     Heart Disease Maternal Grandmother     High Blood Pressure Maternal Grandmother     High Blood Pressure Maternal Grandfather     High Blood Pressure Paternal Grandmother     High Blood Pressure Paternal Grandfather     Diabetes Brother          Review of Systems -   General ROS: negative  Psychological ROS: negative  Hematological and Lymphatic ROS: No history of blood clots or bleeding disorder. Respiratory ROS: no cough, shortness of breath, or wheezing  Cardiovascular ROS: As per HPI  Gastrointestinal ROS: negative  Genito-Urinary ROS: no dysuria, trouble voiding, or hematuria  Musculoskeletal ROS: negative  Neurological ROS: no TIA or stroke symptoms  Dermatological ROS: negative    All others reviewed and are negative.        /70   Pulse 60   Temp 98 °F (36.7 °C) (Oral)   Resp 16   Ht 5' 9\" (1.753 m)   Wt 220 lb (99.8 kg)   SpO2 96%   BMI 32.49 kg/m²       Physical Examination:   General appearance - alert, in no distress  Mental status - alert, oriented to person, place, and time  Neck - supple, no significant adenopathy, no JVD, or carotid bruits  Chest - clear to auscultation, no wheezes, rales or rhonchi, symmetric air entry  Heart - normal rate, regular rhythm, normal S1, S2, no murmurs, rubs, clicks or gallops  Abdomen - soft, nontender, nondistended, no masses or organomegaly  Neurological - alert, oriented, normal speech, no focal findings or movement disorder noted  Musculoskeletal - no joint tenderness, deformity or swelling  Extremities - peripheral pulses normal, no pedal edema, no clubbing or cyanosis  Skin - normal coloration and turgor, no rashes, no suspicious skin lesions noted      LABS:    Recent Labs 02/06/21  1139 02/06/21  1541 02/06/21  1823   TROPONINT < 0.010 < 0.010 < 0.010     CBC:   Lab Results   Component Value Date    WBC 7.8 02/07/2021    RBC 5.12 02/07/2021    RBC 5.43 10/08/2015    HGB 15.9 02/07/2021    HCT 46.6 02/07/2021    MCV 91.0 02/07/2021    MCH 31.1 02/07/2021    MCHC 34.1 02/07/2021    RDW 13.3 12/14/2017     02/07/2021    MPV 11.1 02/07/2021     BMP:    Lab Results   Component Value Date     02/06/2021    K 3.8 02/06/2021    K 4.2 10/09/2019     02/06/2021    CO2 25 02/06/2021    BUN 17 02/06/2021    LABALBU 4.4 12/02/2020    LABALBU 4.5 10/13/2011    CREATININE 1.0 02/06/2021    CALCIUM 9.1 02/06/2021    LABGLOM 74 02/06/2021    GLUCOSE 155 02/06/2021     Hepatic Function Panel:    Lab Results   Component Value Date    ALKPHOS 54 12/02/2020    ALT 30 12/02/2020    AST 26 12/02/2020    PROT 7.5 12/02/2020    PROT 6.8 08/27/2013    BILITOT 1.2 12/02/2020    BILIDIR <0.2 12/02/2020    LABALBU 4.4 12/02/2020    LABALBU 4.5 10/13/2011     Magnesium:    Lab Results   Component Value Date    MG 2.0 10/10/2019     Warfarin PT/INR:  No components found for: PTPATWAR, PTINRWAR  HgBA1c:  No results found for: LABA1C  FLP:    Lab Results   Component Value Date    TRIG 555 02/07/2021    HDL 32 02/07/2021    LDLCALC SEE BELOW 02/07/2021     TSH:    Lab Results   Component Value Date    TSH 2.690 09/09/2020     BNP: No components found for: PRO-BNP      Assessment/Plan:    Patient Active Problem List   Diagnosis    Coronary artery disease involving native coronary artery of native heart with unstable angina pectoris ([address]    Hypertension    Hyperlipidemia    Sleep apnea    Hemorrhoids    GERD (gastroesophageal reflux disease)    Pharyngitis, acute    Back pain    Urinary frequency    Hematuria, undiagnosed cause    OAB (overactive bladder)    BPH associated with nocturia    Tracheobronchitis    Cervical muscle pain    Acute cervical myofascial strain, secondary to fall  DDD (degenerative disc disease), cervical    Skin tag, buttocks.  Chest pain    Angina effort    Dyslipidemia    S/P angioplasty with stent    Erectile dysfunction due to arterial insufficiency    Primary insomnia    Medication monitoring encounter    Bilateral tinnitus    Acquired hypothyroidism    [facility] fibrillation [facility] ([facility])    Atrial flutter ([facility])    PAD (peripheral artery disease) ([facility])     Chest pain concerning for unstable angina  CAD s/p PCI  HTN  HLD  Afib    Telemetry  Hold Eliquis  Discussed stress test vs cath  Patient prefers cath as he had two previous stress test which did not show anything  IV heparin  Continue Aspirin, plavix, atenolol  On Repatha as outpatient  Further recs based on results and clinical course    Please do note hesitate to contact me for any further questions. Thank you for the opportunity to be involved in this patient's care.     Code Status: Full Code    Electronically signed by Radha Quiles MD on 2/7/2021 at 8:47 AM

## 2021-02-08 ENCOUNTER — APPOINTMENT (OUTPATIENT)
Dept: CARDIAC CATH/INVASIVE PROCEDURES | Age: 71
DRG: 247 | End: 2021-02-08
Payer: MEDICARE

## 2021-02-08 LAB
ACTIVATED CLOTTING TIME: 456 SECONDS (ref 1–150)
ANION GAP SERPL CALCULATED.3IONS-SCNC: 12 MEQ/L (ref 8–16)
APTT: 67.4 SECONDS (ref 22–38)
BUN BLDV-MCNC: 19 MG/DL (ref 7–22)
CALCIUM SERPL-MCNC: 9.3 MG/DL (ref 8.5–10.5)
CHLORIDE BLD-SCNC: 100 MEQ/L (ref 98–111)
CHOLESTEROL, TOTAL: 187 MG/DL (ref 100–199)
CO2: 27 MEQ/L (ref 23–33)
CREAT SERPL-MCNC: 1.1 MG/DL (ref 0.4–1.2)
EKG ATRIAL RATE: 72 BPM
EKG P AXIS: 48 DEGREES
EKG P-R INTERVAL: 176 MS
EKG Q-T INTERVAL: 412 MS
EKG QRS DURATION: 90 MS
EKG QTC CALCULATION (BAZETT): 451 MS
EKG R AXIS: -14 DEGREES
EKG T AXIS: 10 DEGREES
EKG VENTRICULAR RATE: 72 BPM
ERYTHROCYTE [DISTWIDTH] IN BLOOD BY AUTOMATED COUNT: 12.9 % (ref 11.5–14.5)
ERYTHROCYTE [DISTWIDTH] IN BLOOD BY AUTOMATED COUNT: 41.9 FL (ref 35–45)
GFR SERPL CREATININE-BSD FRML MDRD: 66 ML/MIN/1.73M2
GLUCOSE BLD-MCNC: 104 MG/DL (ref 70–108)
GLUCOSE BLD-MCNC: 110 MG/DL (ref 70–108)
GLUCOSE BLD-MCNC: 119 MG/DL (ref 70–108)
GLUCOSE BLD-MCNC: 124 MG/DL (ref 70–108)
HCT VFR BLD CALC: 45 % (ref 42–52)
HDLC SERPL-MCNC: 31 MG/DL
HEMOGLOBIN: 15.5 GM/DL (ref 14–18)
INR BLD: 1.06 (ref 0.85–1.13)
LDL CHOLESTEROL CALCULATED: ABNORMAL MG/DL
LV EF: 55 %
LVEF MODALITY: NORMAL
MCH RBC QN AUTO: 31.2 PG (ref 26–33)
MCHC RBC AUTO-ENTMCNC: 34.4 GM/DL (ref 32.2–35.5)
MCV RBC AUTO: 90.5 FL (ref 80–94)
PLATELET # BLD: 141 THOU/MM3 (ref 130–400)
PMV BLD AUTO: 11.1 FL (ref 9.4–12.4)
POTASSIUM REFLEX MAGNESIUM: 3.8 MEQ/L (ref 3.5–5.2)
RBC # BLD: 4.97 MILL/MM3 (ref 4.7–6.1)
SODIUM BLD-SCNC: 139 MEQ/L (ref 135–145)
TRIGL SERPL-MCNC: 609 MG/DL (ref 0–199)
WBC # BLD: 7.2 THOU/MM3 (ref 4.8–10.8)

## 2021-02-08 PROCEDURE — 93010 ELECTROCARDIOGRAM REPORT: CPT | Performed by: INTERNAL MEDICINE

## 2021-02-08 PROCEDURE — 6370000000 HC RX 637 (ALT 250 FOR IP): Performed by: INTERNAL MEDICINE

## 2021-02-08 PROCEDURE — 80048 BASIC METABOLIC PNL TOTAL CA: CPT

## 2021-02-08 PROCEDURE — 93454 CORONARY ARTERY ANGIO S&I: CPT | Performed by: INTERNAL MEDICINE

## 2021-02-08 PROCEDURE — 027034Z DILATION OF CORONARY ARTERY, ONE ARTERY WITH DRUG-ELUTING INTRALUMINAL DEVICE, PERCUTANEOUS APPROACH: ICD-10-PCS | Performed by: INTERNAL MEDICINE

## 2021-02-08 PROCEDURE — 6360000002 HC RX W HCPCS

## 2021-02-08 PROCEDURE — 2709999900 HC NON-CHARGEABLE SUPPLY

## 2021-02-08 PROCEDURE — 80061 LIPID PANEL: CPT

## 2021-02-08 PROCEDURE — C1894 INTRO/SHEATH, NON-LASER: HCPCS

## 2021-02-08 PROCEDURE — 85027 COMPLETE CBC AUTOMATED: CPT

## 2021-02-08 PROCEDURE — B2111ZZ FLUOROSCOPY OF MULTIPLE CORONARY ARTERIES USING LOW OSMOLAR CONTRAST: ICD-10-PCS | Performed by: INTERNAL MEDICINE

## 2021-02-08 PROCEDURE — 6360000004 HC RX CONTRAST MEDICATION: Performed by: PHYSICIAN ASSISTANT

## 2021-02-08 PROCEDURE — 85610 PROTHROMBIN TIME: CPT

## 2021-02-08 PROCEDURE — 2060000000 HC ICU INTERMEDIATE R&B

## 2021-02-08 PROCEDURE — C1769 GUIDE WIRE: HCPCS

## 2021-02-08 PROCEDURE — C9600 PERC DRUG-EL COR STENT SING: HCPCS | Performed by: INTERNAL MEDICINE

## 2021-02-08 PROCEDURE — 4A023N7 MEASUREMENT OF CARDIAC SAMPLING AND PRESSURE, LEFT HEART, PERCUTANEOUS APPROACH: ICD-10-PCS | Performed by: INTERNAL MEDICINE

## 2021-02-08 PROCEDURE — 93005 ELECTROCARDIOGRAM TRACING: CPT | Performed by: INTERNAL MEDICINE

## 2021-02-08 PROCEDURE — 92928 PRQ TCAT PLMT NTRAC ST 1 LES: CPT | Performed by: INTERNAL MEDICINE

## 2021-02-08 PROCEDURE — 93306 TTE W/DOPPLER COMPLETE: CPT

## 2021-02-08 PROCEDURE — 99024 POSTOP FOLLOW-UP VISIT: CPT | Performed by: PHYSICIAN ASSISTANT

## 2021-02-08 PROCEDURE — 2580000003 HC RX 258: Performed by: INTERNAL MEDICINE

## 2021-02-08 PROCEDURE — 82948 REAGENT STRIP/BLOOD GLUCOSE: CPT

## 2021-02-08 PROCEDURE — 6370000000 HC RX 637 (ALT 250 FOR IP)

## 2021-02-08 PROCEDURE — 85347 COAGULATION TIME ACTIVATED: CPT

## 2021-02-08 PROCEDURE — C1887 CATHETER, GUIDING: HCPCS

## 2021-02-08 PROCEDURE — C1725 CATH, TRANSLUMIN NON-LASER: HCPCS

## 2021-02-08 PROCEDURE — 2500000003 HC RX 250 WO HCPCS

## 2021-02-08 PROCEDURE — 85730 THROMBOPLASTIN TIME PARTIAL: CPT

## 2021-02-08 PROCEDURE — C1874 STENT, COATED/COV W/DEL SYS: HCPCS

## 2021-02-08 PROCEDURE — 36415 COLL VENOUS BLD VENIPUNCTURE: CPT

## 2021-02-08 RX ORDER — ASPIRIN 81 MG/1
81 TABLET, CHEWABLE ORAL DAILY
Status: DISCONTINUED | OUTPATIENT
Start: 2021-02-09 | End: 2021-02-09 | Stop reason: HOSPADM

## 2021-02-08 RX ORDER — DIPHENHYDRAMINE HYDROCHLORIDE 50 MG/ML
25 INJECTION INTRAMUSCULAR; INTRAVENOUS ONCE
Status: DISCONTINUED | OUTPATIENT
Start: 2021-02-08 | End: 2021-02-08

## 2021-02-08 RX ORDER — SODIUM CHLORIDE 0.9 % (FLUSH) 0.9 %
10 SYRINGE (ML) INJECTION EVERY 12 HOURS SCHEDULED
Status: DISCONTINUED | OUTPATIENT
Start: 2021-02-08 | End: 2021-02-09 | Stop reason: HOSPADM

## 2021-02-08 RX ORDER — NITROGLYCERIN 0.4 MG/1
0.4 TABLET SUBLINGUAL EVERY 5 MIN PRN
Status: DISCONTINUED | OUTPATIENT
Start: 2021-02-08 | End: 2021-02-08 | Stop reason: SDUPTHER

## 2021-02-08 RX ORDER — ASPIRIN 81 MG/1
324 TABLET, CHEWABLE ORAL ONCE
Status: DISCONTINUED | OUTPATIENT
Start: 2021-02-08 | End: 2021-02-08

## 2021-02-08 RX ORDER — SODIUM CHLORIDE 9 MG/ML
INJECTION, SOLUTION INTRAVENOUS CONTINUOUS
Status: DISCONTINUED | OUTPATIENT
Start: 2021-02-08 | End: 2021-02-09 | Stop reason: HOSPADM

## 2021-02-08 RX ORDER — ACETAMINOPHEN 325 MG/1
650 TABLET ORAL EVERY 4 HOURS PRN
Status: DISCONTINUED | OUTPATIENT
Start: 2021-02-08 | End: 2021-02-09 | Stop reason: HOSPADM

## 2021-02-08 RX ORDER — SODIUM CHLORIDE 0.9 % (FLUSH) 0.9 %
10 SYRINGE (ML) INJECTION PRN
Status: DISCONTINUED | OUTPATIENT
Start: 2021-02-08 | End: 2021-02-09 | Stop reason: HOSPADM

## 2021-02-08 RX ADMIN — ATORVASTATIN CALCIUM 80 MG: 80 TABLET, FILM COATED ORAL at 21:28

## 2021-02-08 RX ADMIN — LEVOTHYROXINE SODIUM 100 MCG: 100 TABLET ORAL at 04:47

## 2021-02-08 RX ADMIN — IOPAMIDOL 90 ML: 755 INJECTION, SOLUTION INTRAVENOUS at 08:01

## 2021-02-08 RX ADMIN — ACETAMINOPHEN 650 MG: 325 TABLET ORAL at 13:07

## 2021-02-08 RX ADMIN — ASPIRIN 81 MG: 81 TABLET, CHEWABLE ORAL at 04:40

## 2021-02-08 RX ADMIN — CLOPIDOGREL BISULFATE 75 MG: 75 TABLET ORAL at 04:40

## 2021-02-08 RX ADMIN — ATENOLOL 25 MG: 25 TABLET ORAL at 08:30

## 2021-02-08 RX ADMIN — Medication 10 ML: at 21:28

## 2021-02-08 RX ADMIN — SODIUM CHLORIDE: 9 INJECTION, SOLUTION INTRAVENOUS at 04:39

## 2021-02-08 RX ADMIN — ACETAMINOPHEN 650 MG: 325 TABLET ORAL at 21:38

## 2021-02-08 RX ADMIN — PANTOPRAZOLE SODIUM 40 MG: 40 TABLET, DELAYED RELEASE ORAL at 04:40

## 2021-02-08 ASSESSMENT — PAIN SCALES - GENERAL
PAINLEVEL_OUTOF10: 0
PAINLEVEL_OUTOF10: 1

## 2021-02-08 NOTE — PRE SEDATION
to proceed; the consent form was signed. MEDICAL HISTORY  [x]ASHD/ANGINA/MI/CHF   [x]Hypertension  []Diabetes  [x]Hyperlipidemia  []Smoking  []Family Hx of ASHD  [x]Additional information:       has a past medical history of Acquired hypothyroidism, CAD (coronary artery disease), Cancer ([facility] [address] DDD (degenerative disc disease), cervical, GERD (gastroesophageal reflux disease), Hyperlipidemia, and Hypertension. SURGICAL HISTORY   has a past surgical history that includes transthoracic echocardiogram (02/25/2010); cardiovascular stress test (02/25/2010); cardiovascular stress test (11/17/2006); cardiovascular stress test (08/18/2005); cardiovascular stress test (07/27/2004); cardiovascular stress test (03/18/2003); Cardiac catheterization (08/20/2004); Percutaneous Transluminal Coronary Angio (07/19/2006); Cardiac catheterization (03/01/2003); and Diagnostic Cardiac Cath Lab Procedure (08/18/2000).   Additional information:       ALLERGIES   Allergies as of 02/06/2021 - Review Complete 02/06/2021   Allergen Reaction Noted    Crestor [rosuvastatin]      Tape [place] tape] Rash 10/10/2013     Additional information:       MEDICATIONS   Aspirin  [x] 81 mg  [] 325 mg  [] None  Antiplatelet drug therapy use last 5 days  []No [x]Yes  Coumadin Use Last 5 Days [x]No []Yes  Other anticoagulant use last 5 days  [x]No []Yes    Current Facility-Administered Medications:     0.9 % sodium chloride infusion, , Intravenous, Continuous, Burak Glover MD, Last Rate: 75 mL/hr at 02/08/21 0439, New Bag at 02/08/21 0439    sodium chloride flush 0.9 % injection 10 mL, 10 mL, Intravenous, 2 times per day, Augusto Schwartz MD    sodium chloride flush 0.9 % injection 10 mL, 10 mL, Intravenous, PRN, Augusto Schwartz MD    nitroGLYCERIN 50 mg in dextrose 5% 250 mL infusion, 5-200 mcg/min, Intravenous, Continuous, Ramiro Valle MD, Last Rate: 1.5 mL/hr at 02/06/21 1144, 5 mcg/min at 02/06/21 1144    atenolol (TENORMIN) tablet 25 EXCEED A TOTAL OF 3 DOSES IN 15 MINUTES 2/1/21  Yes Nathaniel Carney MD   SYNTHROID 100 MCG tablet Take 1 tablet by mouth daily Take with water on an empty stomach- wait 30 minutes before eating or taking other meds. 1/18/21  Yes BASHIR Estrada - CNP   atenolol (TENORMIN) 25 MG tablet Take 1 tablet by mouth daily 1/11/21  Yes Nathaniel Carney MD   Evolocumab 140 MG/ML SOAJ Inject 140 mg into the skin every 14 days 1/7/21  Yes Nathaniel Carney MD   clopidogrel (PLAVIX) 75 MG tablet Take 1 tablet by mouth once daily 11/4/20  Yes Nathaniel Carney MD   apixaban (ELIQUIS) 5 MG TABS tablet TAKE 1 TABLET BY MOUTH EVERY 12 HOURS 11/4/20  Yes Nathaniel Carney MD   valsartan-hydrochlorothiazide (DIOVAN-HCT) 160-12.5 MG per tablet Take 1 tablet by mouth 2 times daily 9/8/20  Yes Kiana Vo MD   Multiple Vitamins-Minerals (THERAPEUTIC MULTIVITAMIN-MINERALS) tablet Take 1 tablet by mouth daily   Yes Historical Provider, MD   Ascorbic Acid (VITAMIN C PO) Take 1,000 mg by mouth daily    Yes Historical Provider, MD   Coenzyme Q10 100 MG CAPS Take 100 mg by mouth daily.      Yes Historical Provider, MD   tamsulosin (FLOMAX) 0.4 MG capsule Take 1 capsule by mouth nightly  Patient taking differently: Take 0.4 mg by mouth as needed  12/19/19   Kiana Vo MD   diphenhydrAMINE-APAP, sleep, (TYLENOL PM EXTRA STRENGTH)  MG tablet Take 2 tablets by mouth nightly as needed for Sleep    Historical Provider, MD   Omeprazole (PRILOSEC PO) Take 10 mg by mouth daily     Historical Provider, MD     Additional information:       VITAL SIGNS   Vitals:    02/08/21 0411   BP: 132/69   Pulse: 63   Resp: 16   Temp: 97 °F (36.1 °C)   SpO2: 94%       PHYSICAL:   General: No acute distress  HEENT:  Unremarkable for age  Neck: without increased JVD, carotid pulses 2+ bilaterally without bruits  Heart: RRR, S1 & S2 WNL, S4 gallop, without murmurs or rubs   NYHA: 2   Lungs: Clear to auscultation    Abdomen: BS present, without HSM, masses, or tenderness    Extremities: without C,C,E.  Pulses 2+ bilaterally  Mental Status: Alert & Oriented        PLANNED PROCEDURE   [x]Cath  [x]PCI                []Pacemaker/AICD  []VENKATESH             []Cardioversion []Peripheral angiography/PTA  []Other:      SEDATION  Planned agent:[x]Midazolam []Meperidine [x]Sublimaze []Morphine  []Diazepam  []Other:     ASA Classification:  []1 []2 [x]3 []4 []5  Class 1: A normal healthy patient  Class 2: Pt with mild to moderate systemic disease  Class 3: Severe systemic disease or disturbance  Class 4: Severe systemic disorders that are already life threatening. Class 5: Moribund pt with little chances of survival, for more than 24 hours. Mallampati I Airway Classification:   []1 [x]2 []3 []4    [x]Pre-procedure diagnostic studies complete and results available. Comment:    [x]Previous sedation/anesthesia experiences assessed. Comment:    [x]The patient is an appropriate candidate to undergo the planned procedure sedation and anesthesia. (Refer to nursing sedation/analgesia documentation record)  [x]Formulation and discussion of sedation/procedure plan, risks, and expectations with patient and/or responsible adult completed. [x]Patient examined immediately prior to the procedure.  (Refer to nursing sedation/analgesia documentation record)    Stephanie Mendoza MD   Electronically signed 2/8/2021 at 7:22 AM

## 2021-02-08 NOTE — PROGRESS NOTES
Hospitalist Progress Note      Patient:  Clayton Marrufo    Unit/Bed:8B-35/035-A  YOB: 1950  MRN: [id number]   Acct: [de-identified]   PCP: BASHIR Larry CNP  Date of Admission: 2/6/2021    Assessment/Plan:    1. Unstable Angina: Patient was admitted to the hospital for chest pain with a cardiac history of CAD, Afib, HTN, and HLD. Cardiology was consulted and Dr. Kadeem Hollingsworth (cardiology) assessed him. Patient had cath today with PCI of the RCAx1 performed by Dr. Brannon Marc (cardiology). Patient tolerated the procedure well. Continue Aspirin, Brilinta, statin, and BB. SI NTG should be given upon discharge from the hospital. 2-D echo should be performed before discharge. He should follow up with Dr. Shimon Enrique in two weeks for further care. 2. Atrial Fibrillation, paroxysmal: Pt is currently in NSR. Historically takes Eliquis. On hold for [facility] tomorrow. 3. HTN: BP has been relatively controlled, latest /55. Resumed Atenolol    4. HLD: Resumed statin. 5. Hypertriglyceridemia: Pt states that this has been an ongoing issue for him. He takes medications for it and follows with PCP. TGs 555. 6. Hypothryoidism: Resumed Synthroid   7. GERD: Resumed Protonix. Chief Complaint: Chest pain     Initial H and P:-    \"James Waters carlito 72 y. o. male who presents to the ED for evaluation of chest pain. Patient states that he's had 2 episodes of chest pain last night and 2 more episodes of chest pain at 7:30am and 9:30am today. He took SL nitro with each episode and reports that pain was completely resolved within 2 minutes of each occurrence. He states that the pain is dull and radiates to his jaw. Patient rated pain 5/10 prior to nitro, and 0/10 after nitro administration. He denies any history of MI, but has had several stents placed. He denies any back pain, headache, diaphoresis, SOB, abdominal pain, nausea, or vomiting.  Patient states that he is not currently experiencing any pain. \"       Subjective (past 24 hours):   Patient had cath with PCI of the RCAx1 performed today by Dr. Gila Armas (cardiology). Patient states that he is feeling well. He denies CP, SOB, HA, or abd pain. Past medical history, family history, social history and allergies reviewed again and is unchanged since admission. ROS : Denies CP, SOB, abd pain, HA     Medications:  Reviewed    Infusion Medications    sodium chloride 75 mL/hr at 02/08/21 0439    nitroGLYCERIN 5 mcg/min (02/06/21 1144)    heparin (PORCINE) Infusion 12 Units/kg/hr (02/08/21 0459)     Scheduled Medications    sodium chloride flush  10 mL Intravenous 2 times per day    sodium chloride flush  10 mL Intravenous 2 times per day    [START ON 2/9/2021] aspirin  81 mg Oral Daily    [START ON 2/9/2021] ticagrelor  90 mg Oral BID    atenolol  25 mg Oral Daily    levothyroxine  100 mcg Oral Daily    atorvastatin  80 mg Oral Nightly    pantoprazole  40 mg Oral QAM AC     PRN Meds: sodium chloride flush, sodium chloride flush, acetaminophen, nitroGLYCERIN, tamsulosin, promethazine **OR** ondansetron, acetaminophen **OR** acetaminophen, polyethylene glycol, acetaminophen **AND** diphenhydrAMINE, heparin (porcine), heparin (porcine)      Intake/Output Summary (Last 24 hours) at 2/8/2021 1247  Last data filed at 2/8/2021 1205  Gross per 24 hour   Intake 508.03 ml   Output 550 ml   Net -41.97 ml       Diet:  DIET CARDIAC; No Caffeine    Exam:  BP (!) 122/55   Pulse 65   Temp 97.9 °F (36.6 °C) (Oral)   Resp 16   Ht 5' 9\" (1.753 m)   Wt 220 lb (99.8 kg)   SpO2 96%   BMI 32.49 kg/m²   General appearance: No apparent distress, appears stated age and cooperative. HEENT: Pupils equal, round, and reactive to light. Conjunctivae/corneas clear. Neck: Supple, with full range of motion. No jugular venous distention. Trachea midline. Respiratory:  Normal respiratory effort on RA.  Clear to auscultation, bilaterally without Rales/Wheezes/Rhonchi. Cardiovascular: Regular rate and rhythm with normal S1/S2 without murmurs, rubs or gallops. Abdomen: Soft, non-tender, non-distended with normal bowel sounds. Musculoskeletal: passive and active ROM x 4 extremities. Skin: Skin color, texture, turgor normal.  No rashes or lesions. Neurologic:  Neurovascularly intact without any focal sensory/motor deficits. Cranial nerves: II-XII intact, grossly non-focal.  Psychiatric: Alert and oriented, thought content appropriate, normal insight  Capillary Refill: Brisk,< 3 seconds   Peripheral Pulses: +2 palpable, equal bilaterally     Labs:   Recent Labs     02/06/21  1139 02/07/21  0216 02/08/21  0329   WBC 7.3 7.8 7.2   HGB 15.9 15.9 15.5   HCT 45.8 46.6 45.0    143 141     Recent Labs     02/06/21  1139 02/08/21  0329    139   K 3.8 3.8    100   CO2 25 27   BUN 17 19   CREATININE 1.0 1.1   CALCIUM 9.1 9.3       Recent Labs     02/06/21  1139 02/08/21  0329   INR 1.19* 1.06       Urinalysis:      Lab Results   Component Value Date    NITRU Negative 07/02/2019    BLOODU Negative 07/02/2019    BLOODU NEGATIVE 06/19/2019    SPECGRAV 1.015 05/15/2019    GLUCOSEU Negative 07/02/2019       Radiology:  XR CHEST PORTABLE   Final Result      No acute intrathoracic process. **This report has been created using voice recognition software. It may contain minor errors which are inherent in voice recognition technology. **      Final report electronically signed by Dr. Valentino Rhymes on 2/6/2021 12:05 PM          Electronically signed by Hortensia Duverney on 2/8/2021 at 12:47 PM   Electronically signed by STACIE Contreras on 2/8/2021 at 4:44 PM

## 2021-02-08 NOTE — CARE COORDINATION
2/8/21, 8:22 AM EST  DISCHARGE PLANNING EVALUATION:    Tuan Phlegm       Admitted: 2/6/2021/ Wallace day: 2   Location: [address] [place] [address] Reason for admit: Unstable angina ([facility] [address] [I20.0]   PMH:  has a past medical history of Acquired hypothyroidism, CAD (coronary artery disease), Cancer ([facility] [address] DDD (degenerative disc disease), cervical, GERD (gastroesophageal reflux disease), Hyperlipidemia, and Hypertension. Procedure: 2-8-21 Cardiac Cath  Barriers to Discharge: To ER with CP. Hx stents. Cardiology consulted and Cardiac Cath planned for today. IVF at 75/hr. PCP: BASHIR Salazar CNP  Readmission Risk Score: 11%  Patient Goals/Plan/Treatment Preferences: Met with pt today. He is from home with spouse and they are self sufficient. No services or DME. He works, he drives and he denies issues getting meds. No discharge needs voiced at present. Transportation/Food Security/Housekeeping Addressed:  No issues identified.

## 2021-02-08 NOTE — BRIEF OP NOTE
[address] [address]  Sedation/Analgesia Post Sedation Record    Pt Name: Maria Luisa Sroiano  Account number: [de-identified]  MRN: [id number]  YOB: 1950  Procedure Performed By: Kaden Garza, [address]  Primary Care Physician: Joe Mcgill, APRN - CNP  Date: 2/8/2021    POST-PROCEDURE    Physicians/Assistants: LIZZ Camargo MD    Procedure Performed:Cath/ PCI      Sedation/Anesthesia: Versed/ Fentanyl and 2% xylocaine local anesthesia. Estimated Blood Loss: < 50 ml. Specimens Removed: None         Disposition of Specimen: N/A        Complications: No Immediate Complications.        Post-procedure Diagnosis/Findings:     PCI of the RCA x 1               LIZZ Arcos MD  Electronically signed 2/8/2021 at 8:03 AM  Interventional Cardiology

## 2021-02-08 NOTE — PROCEDURES
[address] [place] [address]                            CARDIAC CATHETERIZATION    PATIENT NAME: Elias Su                  :        1950  MED REC NO:   [id number]                           ROOM:       1400  ACCOUNT NO:   [de-identified]                           ADMIT DATE: 2021  PROVIDER:     Tessie Albarran MD    DATE OF PROCEDURE:  2021    INDICATION:  Unstable angina with history of multivessel CAD/stenting. DESCRIPTION OF PROCEDURE:  After informed consent was obtained from the  patient, he was brought to cardiac catheterization laboratory and  prepped in sterile fashion. Right radial artery was chosen as the  primary point of access. Preprocedure time-out was completed. After  infiltration of the right wrist with 2% lidocaine using micropuncture,  modified Seldinger technique under fluoroscopic guidance and ultrasound  guidance, I was able to insert a 6-[place] Slender sheath in the right  radial artery. Standard antispasmodic/antithrombotic cocktail was given  intraarterially. I then performed diagnostic coronary angiography using  a 5-[place] JL3.5 and 5-[place] JR4 catheters. CORONARY ANGIOGRAM:  LEFT MAIN:  Small, but patent. LAD:  Has diffuse disease proximally and then previously placed stent in  the proximal mid segment that has significant longitudinal in-stent  restenosis, but still has a patent vessel, approximately 2.5 to 3 mm in  diameter. Distal LAD has diffuse disease with possible small distal  myocardial bridge seen at the apex. LCX:  No significant obstruction, gives rise to OM1 and OM2 branches  that are patent. The OM2 branch has about 40% stenosis in the mid  segment. AV groove branch has no significant obstruction.     RCA:  90% to 95% stenosis proximally followed by multiple overlapping  stents seen in the mid section with diffuse disease distally,

## 2021-02-08 NOTE — PROGRESS NOTES
Inpatient Cardiac Rehabilitation Consult    Received consult for Phase II Cardiac Rehabilitation. Cardiac Rehab education completed with patient. Pt is not able to attend rehab d/t work schedule. Brochure given.

## 2021-02-08 NOTE — PROGRESS NOTES
Cardiology Progress Note      Patient:  Tena Xiong  YOB: 1950  MRN: [id number]   Acct: [de-identified]  Admit Date:  2/6/2021  Primary Cardiologist: Malaika Bonds MD    Note per dr Mia Aleman for Consultation:  Chest pain        History Of Present Illness:    72 y.o. pleasant male c hx of CAD s/p PCI, HTN, HLD who presented to the hospital with complaints of chest pain. The chest pain was midsternal, pressure like, non radiating, no aggravating factors, occurred while sitting down, alleviated by NTG. EKG shows SR with PVC, Echo from 10/2019 showed EF 65%, mild cLVH, mild AR, cath from 12/2017 showed patient underwent Cutting balloon angioplasty of mid LAD. Trops x 3 negative. \"    Subjective (Events in last 24 hours): pt awake and alert. NAD. No cp or sob.   No complaints      Objective:   BP (!) 122/55   Pulse 65   Temp 97.9 °F (36.6 °C) (Oral)   Resp 16   Ht 5' 9\" (1.753 m)   Wt 220 lb (99.8 kg)   SpO2 96%   BMI 32.49 kg/m²        TELEMETRY: nsr    Physical Exam:  General Appearance: alert and oriented to person, place and time, in no acute distress  Cardiovascular: normal rate, regular rhythm, normal S1 and S2, no murmurs, rubs, clicks, or gallops, distal pulses intact, no carotid bruits, no JVD  Pulmonary/Chest: clear to auscultation bilaterally- no wheezes, rales or rhonchi, normal air movement, no respiratory distress  Abdomen: soft, non-tender, non-distended, normal bowel sounds, no masses Extremities: no cyanosis, clubbing or edema, pulse   Skin: warm and dry, no rash or erythema  Head: normocephalic and atraumatic  Eyes: pupils equal, round, and reactive to light  Neck: supple and non-tender without mass, no thyromegaly   Musculoskeletal: normal range of motion, no joint swelling, deformity or tenderness  Neurological: alert, oriented, normal speech, no focal findings or movement disorder noted    Medications:    sodium chloride flush  10 mL Intravenous 2 times per day    sodium chloride flush  10 mL Intravenous 2 times per day    [START ON 2/9/2021] aspirin  81 mg Oral Daily    [START ON 2/9/2021] ticagrelor  90 mg Oral BID    atenolol  25 mg Oral Daily    levothyroxine  100 mcg Oral Daily    atorvastatin  80 mg Oral Nightly    pantoprazole  40 mg Oral QAM AC      sodium chloride 75 mL/hr at 02/08/21 0439    nitroGLYCERIN 5 mcg/min (02/06/21 1144)    heparin (PORCINE) Infusion 12 Units/kg/hr (02/08/21 0459)         sodium chloride flush, 10 mL, PRN      sodium chloride flush, 10 mL, PRN      acetaminophen, 650 mg, Q4H PRN      nitroGLYCERIN, 0.4 mg, Q5 Min PRN      tamsulosin, 0.4 mg, PRN      promethazine, 12.5 mg, Q6H PRN    Or      ondansetron, 4 mg, Q6H PRN      acetaminophen, 650 mg, Q6H PRN    Or      acetaminophen, 650 mg, Q6H PRN      polyethylene glycol, 17 g, Daily PRN      acetaminophen, 1,000 mg, Nightly PRN    And      diphenhydrAMINE, 50 mg, Nightly PRN      heparin (porcine), 4,000 Units, PRN      heparin (porcine), 2,000 Units, PRN        Diagnostics:  Cath 2/8/21  CORONARY ANGIOGRAM:  LEFT MAIN:  Small, but patent.     LAD:  Has diffuse disease proximally and then previously placed stent in  the proximal mid segment that has significant longitudinal in-stent  restenosis, but still has a patent vessel, approximately 2.5 to 3 mm in  diameter. Distal LAD has diffuse disease with possible small distal  myocardial bridge seen at the apex.     LCX:  No significant obstruction, gives rise to OM1 and OM2 branches  that are patent. The OM2 branch has about 40% stenosis in the mid  segment. AV groove branch has no significant obstruction.     RCA:  90% to 95% stenosis proximally followed by multiple overlapping  stents seen in the mid section with diffuse disease distally,  bifurcating into the PDA and the PLB, both without any significant  obstruction.   PDA is dominant.     INTERVENTION:  Given the findings and presentation, I elected to 12/02/2020    ALT 30 12/02/2020    AST 26 12/02/2020    PROT 7.5 12/02/2020    PROT 6.8 08/27/2013    BILITOT 1.2 12/02/2020    BILIDIR <0.2 12/02/2020    LABALBU 4.4 12/02/2020    LABALBU 4.5 10/13/2011       Magnesium:    Lab Results   Component Value Date    MG 2.0 10/10/2019       PT/INR:    Lab Results   Component Value Date    INR 1.06 02/08/2021       HgBA1c:  No results found for: LABA1C    FLP:    Lab Results   Component Value Date    TRIG 609 02/08/2021    HDL 31 02/08/2021    LDLCALC SEE BELOW 02/08/2021       TSH:    Lab Results   Component Value Date    TSH 2.690 09/09/2020         Assessment:    [place] - trop neg x3  S/p cath 2/8/21 - s/p ARTHUR RCA  Hx CAD and PCI  HTN  HLD  Hx afib    Plan:    Obtain 2d-echo  Cont asa/brilinta/statin/BB  Restart eliquis when ok with dr Mortimer [facility]  Sl ntg upon dc  F/up dr Funmilayo Howard 2 weeks         Electronically signed by Antonio Ridley PA-C on 2/8/2021 at 12:34 PM

## 2021-02-09 VITALS
WEIGHT: 220 LBS | HEIGHT: 69 IN | HEART RATE: 66 BPM | TEMPERATURE: 97.6 F | SYSTOLIC BLOOD PRESSURE: 148 MMHG | RESPIRATION RATE: 18 BRPM | OXYGEN SATURATION: 97 % | BODY MASS INDEX: 32.58 KG/M2 | DIASTOLIC BLOOD PRESSURE: 76 MMHG

## 2021-02-09 LAB — GLUCOSE BLD-MCNC: 121 MG/DL (ref 70–108)

## 2021-02-09 PROCEDURE — 6370000000 HC RX 637 (ALT 250 FOR IP): Performed by: INTERNAL MEDICINE

## 2021-02-09 PROCEDURE — 82948 REAGENT STRIP/BLOOD GLUCOSE: CPT

## 2021-02-09 PROCEDURE — 99232 SBSQ HOSP IP/OBS MODERATE 35: CPT | Performed by: NURSE PRACTITIONER

## 2021-02-09 PROCEDURE — 6370000000 HC RX 637 (ALT 250 FOR IP): Performed by: PHYSICIAN ASSISTANT

## 2021-02-09 PROCEDURE — 99239 HOSP IP/OBS DSCHRG MGMT >30: CPT | Performed by: PHYSICIAN ASSISTANT

## 2021-02-09 PROCEDURE — 2580000003 HC RX 258: Performed by: INTERNAL MEDICINE

## 2021-02-09 RX ORDER — CLOPIDOGREL BISULFATE 75 MG/1
75 TABLET ORAL DAILY
Status: DISCONTINUED | OUTPATIENT
Start: 2021-02-09 | End: 2021-02-09 | Stop reason: HOSPADM

## 2021-02-09 RX ORDER — ASPIRIN 81 MG/1
81 TABLET, CHEWABLE ORAL DAILY
Qty: 30 TABLET | Refills: 0 | COMMUNITY
Start: 2021-02-10 | End: 2022-02-10

## 2021-02-09 RX ORDER — FENOFIBRATE 160 MG/1
160 TABLET ORAL DAILY
Qty: 30 TABLET | Refills: 3 | Status: SHIPPED | OUTPATIENT
Start: 2021-02-09 | End: 2021-06-07

## 2021-02-09 RX ORDER — ATORVASTATIN CALCIUM 80 MG/1
80 TABLET, FILM COATED ORAL NIGHTLY
Qty: 30 TABLET | Refills: 3 | Status: SHIPPED | OUTPATIENT
Start: 2021-02-09 | End: 2021-02-26

## 2021-02-09 RX ORDER — FENOFIBRATE 160 MG/1
160 TABLET ORAL DAILY
Status: DISCONTINUED | OUTPATIENT
Start: 2021-02-09 | End: 2021-02-09 | Stop reason: HOSPADM

## 2021-02-09 RX ADMIN — CLOPIDOGREL BISULFATE 75 MG: 75 TABLET ORAL at 08:01

## 2021-02-09 RX ADMIN — ATENOLOL 25 MG: 25 TABLET ORAL at 08:00

## 2021-02-09 RX ADMIN — APIXABAN 5 MG: 2.5 TABLET, FILM COATED ORAL at 08:00

## 2021-02-09 RX ADMIN — LEVOTHYROXINE SODIUM 100 MCG: 100 TABLET ORAL at 06:07

## 2021-02-09 RX ADMIN — Medication 10 ML: at 08:01

## 2021-02-09 RX ADMIN — ASPIRIN 81 MG: 81 TABLET, CHEWABLE ORAL at 08:00

## 2021-02-09 RX ADMIN — PANTOPRAZOLE SODIUM 40 MG: 40 TABLET, DELAYED RELEASE ORAL at 06:07

## 2021-02-09 ASSESSMENT — PAIN DESCRIPTION - PROGRESSION: CLINICAL_PROGRESSION: NOT CHANGED

## 2021-02-09 ASSESSMENT — PAIN DESCRIPTION - DESCRIPTORS: DESCRIPTORS: ACHING;DULL

## 2021-02-09 ASSESSMENT — PAIN - FUNCTIONAL ASSESSMENT: PAIN_FUNCTIONAL_ASSESSMENT: ACTIVITIES ARE NOT PREVENTED

## 2021-02-09 NOTE — CARE COORDINATION
2/9/21, 10:42 AM EST    Patient goals/plan/ treatment preferences discussed by  and . Patient goals/plan/ treatment preferences reviewed with patient/ family. Patient/ family verbalize understanding of discharge plan and are in agreement with goal/plan/treatment preferences. Understanding was demonstrated using the teach back method. AVS provided by RN at time of discharge, which includes all necessary medical information pertaining to the patients current course of illness, treatment, post-discharge goals of care, and treatment preferences. IMM Letter  IMM Letter given to Patient/Family/Significant other/Guardian/POA/by[de-identified] Ynes BROWN Case Manager. IMM Letter date given[de-identified] 02/09/21  IMM Letter time given[de-identified] 4205       Revisit with pt and spouse this am.  They are both incredibly pleased with the care and kindness from the staff at [address] [place] [address]. They deny any discharge needs. Planning for discharge today.

## 2021-02-09 NOTE — DISCHARGE SUMMARY
Hospitalist Discharge Summary        Patient: Dalila Apley  YOB: 1950  MRN: [id number]   Acct: [de-identified]    Primary Care Physician: BASHIR Estrada CNP    Admit date  2/6/2021    Discharge date:  2/9/2021 12:34 PM    Chief Complaint on presentation :-  Chest pain     Discharge Assessment and Plan:-   1. Unstable Angina: Patient was admitted to the hospital for chest pain with a cardiac history of CAD, Afib, HTN, and HLD. Cardiology was consulted and Dr. Adrien Jeff (cardiology) assessed him. Patient had cath on 02/8 with PCI of the RCAx1 performed by Dr. Richard Alford (cardiology). Patient tolerated the procedure well. DAPT with Aspirin, Eliquis, and Plavix. Pt should stop Aspirin in one month but continue Eliquis and Plavix. SI NTG prescription given. Follow up with Dr. Roma Fregoso in two weeks for further care. 2. Atrial Fibrillation, paroxysmal: Pt in NSR upon discharge. Restart Eliquis. 3. HTN: Continue Atenolol    4. HLD: Continue statin.   5. Hypertriglyceridemia: Pt states that this has been an ongoing issue for him. He takes medications for it and follows with PCP. TGs 555.   6. Hypothryoidism: Continue Synthroid   7. GERD: Continue Protonix.     Initial H and P and Hospital course:-  \"James Waters carlito 72 y. o. male who presents to the ED for evaluation of chest pain. Patient states that he's had 2 episodes of chest pain last night and 2 more episodes of chest pain at 7:30am and 9:30am today. He took SL nitro with each episode and reports that pain was completely resolved within 2 minutes of each occurrence. He states that the pain is dull and radiates to his jaw. Patient rated pain 5/10 prior to nitro, and 0/10 after nitro administration. He denies any history of MI, but has had several stents placed. He denies any back pain, headache, diaphoresis, SOB, abdominal pain, nausea, or vomiting. Patient states that he is not currently experiencing any pain. \"    02/6: Pt was admitted to the hospital for unstable angina. Cardiology was consulted. Heparin and NTG drip were started. 02/7: Dr. Zoraida Henry (cardiology) assessed patient. Discussed plan for cath with possible stent. Continued Aspirin, plavix, and atenolol. Continue Heparin. Plan for catheterization on 2/8.     02/8: Patient had cath with PCI of the RCAx1 performed by Dr. Ashlyn Mota (cardiology). Patient tolerated the procedure well. Continue Aspirin, Brilinta, statin, and BB. Patient in stable condition and asymptomatic.     02/9: Patient doing well today and ready for discharge home. He notes that he has been asymptomatic through the night. Denied CP, SOB, abdominal pain, or HA. Discharged home with instructions for DAPT with Aspirin, Eliquis, and Plavix. Pt should stop Aspirin in one month but continue Eliquis and Plavix. SI NTG prescribed. Follow up with Dr. Miky Garibay in 2 weeks for reevaluation. Physical Exam:-  General appearance: No apparent distress, appears stated age and cooperative. HEENT: Pupils equal, round, and reactive to light. Conjunctivae/corneas clear. Neck: Supple, with full range of motion. No jugular venous distention. Trachea midline. Respiratory:  Normal respiratory effort on RA. Clear to auscultation, bilaterally without Rales/Wheezes/Rhonchi. Cardiovascular: Regular rate and rhythm with normal S1/S2 without murmurs, rubs or gallops. Abdomen: Soft, non-tender, non-distended with normal bowel sounds. Musculoskeletal: passive and active ROM x 4 extremities. Skin: Skin color, texture, turgor normal.  No rashes or lesions. Neurologic:  Neurovascularly intact without any focal sensory/motor deficits.  Cranial nerves: II-XII intact, grossly non-focal.  Psychiatric: Alert and oriented, thought content appropriate, normal insight  Capillary Refill: Brisk,< 3 seconds   Peripheral Pulses: +2 palpable, equal bilaterally       Vitals:   Patient Vitals for the past 24 hrs:   BP Temp Temp src Pulse Resp SpO2   02/09/21 0745 (!) 148/76 97.6 °F (36.4 °C) Oral 66 18 97 %   02/09/21 0336 124/71 97.9 °F (36.6 °C) Oral 64 18 97 %   02/08/21 2115 (!) 143/76 98 °F (36.7 °C) Oral 64 18 97 %     Weight:   Weight: 220 lb (99.8 kg)   24 hour intake/output:     Intake/Output Summary (Last 24 hours) at 2/9/2021 1234  Last data filed at 2/9/2021 2028  Gross per 24 hour   Intake 980 ml   Output 475 ml   Net 505 ml         Discharge Medications:-      Medication List      START taking these medications    aspirin 81 MG chewable tablet  Take 1 tablet by mouth daily Take for 30 days then stop  Start taking on: February 10, 2021     atorvastatin 80 MG tablet  Commonly known as: LIPITOR  Take 1 tablet by mouth nightly     fenofibrate 160 MG tablet  Commonly known as: TRIGLIDE  Take 1 tablet by mouth daily        CHANGE how you take these medications    tamsulosin 0.4 MG capsule  Commonly known as: FLOMAX  Take 1 capsule by mouth nightly  What changed:   · when to take this  · reasons to take this        CONTINUE taking these medications    apixaban 5 MG Tabs tablet  Commonly known as: Eliquis  TAKE 1 TABLET BY MOUTH EVERY 12 HOURS     atenolol 25 MG tablet  Commonly known as: TENORMIN  Take 1 tablet by mouth daily     clopidogrel 75 MG tablet  Commonly known as: PLAVIX  Take 1 tablet by mouth once daily     coenzyme Q10 100 MG Caps capsule     Evolocumab 140 MG/ML Soaj  Inject 140 mg into the skin every 14 days     nitroGLYCERIN 0.4 MG SL tablet  Commonly known as: NITROSTAT  DISSOLVE ONE TABLET UNDER THE TONGUE EVERY 5 MINUTES AS NEEDED FOR CHEST PAIN. DO NOT EXCEED A TOTAL OF 3 DOSES IN 15 MINUTES     PRILOSEC PO     Synthroid 100 MCG tablet  Generic drug: levothyroxine  Take 1 tablet by mouth daily Take with water on an empty stomach- wait 30 minutes before eating or taking other meds.      therapeutic multivitamin-minerals tablet     Tylenol PM Extra Strength  MG tablet  Generic drug: diphenhydrAMINE-APAP (sleep)     valsartan-hydrochlorothiazide 160-12.5 MG per tablet  Commonly known as: DIOVAN-HCT  Take 1 tablet by mouth 2 times daily     VITAMIN C PO           Where to Get Your Medications      These medications were sent to [address] [place] [address], [address]  [address] [facility] [facility],[address]    Phone: [phone]   · atorvastatin 80 MG tablet  · fenofibrate 160 MG tablet     You can get these medications from any pharmacy    You don't need a prescription for these medications  · aspirin 81 MG chewable tablet          Labs :-  Recent Results (from the past 72 hour(s))   Troponin    Collection Time: 02/06/21  3:41 PM   Result Value Ref Range    Troponin T < 0.010 ng/ml   APTT    Collection Time: 02/06/21  3:41 PM   Result Value Ref Range    aPTT 34.1 22.0 - 38.0 seconds   Troponin    Collection Time: 02/06/21  6:23 PM   Result Value Ref Range    Troponin T < 0.010 ng/ml   CBC    Collection Time: 02/07/21  2:16 AM   Result Value Ref Range    WBC 7.8 4.8 - 10.8 thou/mm3    RBC 5.12 4.70 - 6.10 mill/mm3    Hemoglobin 15.9 14.0 - 18.0 gm/dl    Hematocrit 46.6 42.0 - 52.0 %    MCV 91.0 80.0 - 94.0 fL    MCH 31.1 26.0 - 33.0 pg    MCHC 34.1 32.2 - 35.5 gm/dl    RDW-CV 12.9 11.5 - 14.5 %    RDW-SD 42.9 35.0 - 45.0 fL    Platelets 878 969 - 962 thou/mm3    MPV 11.1 9.4 - 12.4 fL   Lipid panel    Collection Time: 02/07/21  2:16 AM   Result Value Ref Range    Cholesterol, Total 184 100 - 199 mg/dL    Triglycerides 555 (H) 0 - 199 mg/dL    HDL 32 mg/dL    LDL Calculated SEE BELOW mg/dL   T4, Free    Collection Time: 02/07/21  2:16 AM   Result Value Ref Range    T4 Free 1.49 0.93 - 1.76 ng/dL   APTT    Collection Time: 02/07/21  2:16 AM   Result Value Ref Range    aPTT 62.2 (H) 22.0 - 38.0 seconds   EKG 12 lead    Collection Time: 02/07/21  5:22 AM   Result Value Ref Range    Ventricular Rate 60 BPM    Atrial Rate 60 BPM    P-R Interval 204 ms    QRS Duration 80 ms    Q-T Interval 442 ms    QTc Calculation ([place]) 442 ms    P Axis 45 degrees    R Axis -24 degrees    T Axis 8 degrees   TYPE AND SCREEN    Collection Time: 02/07/21  9:21 AM   Result Value Ref Range    ABO O     Rh Factor POS     Antibody Screen NEG    APTT    Collection Time: 02/07/21  9:23 AM   Result Value Ref Range    aPTT 55.7 (H) 22.0 - 38.0 seconds   APTT    Collection Time: 02/07/21  3:59 PM   Result Value Ref Range    aPTT 65.8 (H) 22.0 - 38.0 seconds   Electrocardiogram, 12-lead     Collection Time: 02/08/21  2:22 AM   Result Value Ref Range    Ventricular Rate 72 BPM    Atrial Rate 72 BPM    P-R Interval 176 ms    QRS Duration 90 ms    Q-T Interval 412 ms    QTc Calculation ([place]) 451 ms    P Axis 48 degrees    R Axis -14 degrees    T Axis 10 degrees   CBC    Collection Time: 02/08/21  3:29 AM   Result Value Ref Range    WBC 7.2 4.8 - 10.8 thou/mm3    RBC 4.97 4.70 - 6.10 mill/mm3    Hemoglobin 15.5 14.0 - 18.0 gm/dl    Hematocrit 45.0 42.0 - 52.0 %    MCV 90.5 80.0 - 94.0 fL    MCH 31.2 26.0 - 33.0 pg    MCHC 34.4 32.2 - 35.5 gm/dl    RDW-CV 12.9 11.5 - 14.5 %    RDW-SD 41.9 35.0 - 45.0 fL    Platelets 804 143 - 009 thou/mm3    MPV 11.1 9.4 - 12.4 fL   Basic Metabolic Panel w/ Reflex to MG    Collection Time: 02/08/21  3:29 AM   Result Value Ref Range    Sodium 139 135 - 145 meq/L    Potassium reflex Magnesium 3.8 3.5 - 5.2 meq/L    Chloride 100 98 - 111 meq/L    CO2 27 23 - 33 meq/L    Glucose 110 (H) 70 - 108 mg/dL    BUN 19 7 - 22 mg/dL    CREATININE 1.1 0.4 - 1.2 mg/dL    Calcium 9.3 8.5 - 10.5 mg/dL   Lipid panel    Collection Time: 02/08/21  3:29 AM   Result Value Ref Range    Cholesterol, Total 187 100 - 199 mg/dL    Triglycerides 609 (H) 0 - 199 mg/dL    HDL 31 mg/dL    LDL Calculated SEE BELOW mg/dL   Protime-INR    Collection Time: 02/08/21  3:29 AM   Result Value Ref Range    INR 1.06 0.85 - 1.13   APTT    Collection Time: 02/08/21  3:29 AM   Result Value Ref Range    aPTT 67.4 (H) 22.0 - 38.0 seconds   Anion Gap    Collection Time: 02/08/21  3:29 AM   Result Value Ref Range    Anion Gap 12.0 8.0 - 16.0 meq/L   Glomerular Filtration Rate, Estimated    Collection Time: 02/08/21  3:29 AM   Result Value Ref Range    Est, Glom Filt Rate 66 (A) ml/min/1.73m2   POCT activated clotting time    Collection Time: 02/08/21  7:57 AM   Result Value Ref Range    Activated Clotting Time 456 (H) 1 - 150 seconds   POCT Glucose    Collection Time: 02/08/21 12:02 PM   Result Value Ref Range    POC Glucose 124 (H) 70 - 108 mg/dl   POCT Glucose    Collection Time: 02/08/21  4:32 PM   Result Value Ref Range    POC Glucose 119 (H) 70 - 108 mg/dl   POCT Glucose    Collection Time: 02/08/21  8:14 PM   Result Value Ref Range    POC Glucose 104 70 - 108 mg/dl   POCT Glucose    Collection Time: 02/09/21  6:19 AM   Result Value Ref Range    POC Glucose 121 (H) 70 - 108 mg/dl      Urinalysis:      Lab Results   Component Value Date    NITRU Negative 07/02/2019    BLOODU Negative 07/02/2019    BLOODU NEGATIVE 06/19/2019    SPECGRAV 1.015 05/15/2019    GLUCOSEU Negative 07/02/2019     Follow-up scheduled after discharge :-    in the next few days with BASHIR Brady - CNP  in 2 weeks with Dr. Felice Shea for cardiology follow up     Consultations during this hospital stay:-  [] NONE [x] Cardiology  [] Nephrology  [] Hemo onco   [] GI   [] ID  [] Endocrine  [] Pulm    [] Neuro    [] Psych   [] Urology  [] ENT   [] G SURGERY   []Ortho    []CV surg    [] Palliative  [] Hospice [] Pain management   []    []TCU   [] PT/OT  OTHERS:-    Disposition: home  Condition at Discharge: Stable    Time Spent:- 50 minutes    Electronically signed by STACIE Jackson on 2/9/2021 at 12:34 PM  Discharging Hospitalist

## 2021-02-09 NOTE — PROGRESS NOTES
Cardiology Progress Note      Patient:  Hannah Awad  YOB: 1950  MRN: [id number]   Acct: [de-identified]  Admit Date:  2/6/2021  Primary Cardiologist: Dr. Mena Faulkner  Seen by Dr. Arsalan Chan    Per prior cardiology consult note-  Reason for Consultation:  Chest pain        History Of Present Illness:    72 y.o. pleasant male c hx of CAD s/p PCI, HTN, HLD who presented to the hospital with complaints of chest pain. The chest pain was midsternal, pressure like, non radiating, no aggravating factors, occurred while sitting down, alleviated by NTG. EKG shows SR with PVC, Echo from 10/2019 showed EF 65%, mild cLVH, mild AR, cath from 12/2017 showed patient underwent Cutting balloon angioplasty of mid LAD.   Trops x 3 negative      Subjective (Events in last 24 hours):     Pt up in chair     No chest pain or SOB     RT radial cath site - slight ecchymosis - no hematoma - neurovascular check WNL   Pulses present       Objective:   BP (!) 148/76   Pulse 66   Temp 97.6 °F (36.4 °C) (Oral)   Resp 18   Ht 5' 9\" (1.753 m)   Wt 220 lb (99.8 kg)   SpO2 97%   BMI 32.49 kg/m²        TELEMETRY: SR no ectopy    Physical Exam:  General Appearance: alert and oriented to person, place and time, in no acute distress  Cardiovascular: normal rate, regular rhythm, normal S1 and S2, no murmurs, rubs, clicks, or gallops, distal pulses intact,   Pulmonary/Chest: clear to auscultation bilaterally- no wheezes, rales or rhonchi, normal air movement, no respiratory distress  Abdomen: soft, non-tender, non-distended, normal bowel sounds, no masses Extremities: no cyanosis, clubbing or edema, pulses present    Skin: warm and dry, no rash or erythema  Head: normocephalic and atraumatic   Musculoskeletal: normal range of motion, no joint swelling, deformity or tenderness  Neurological: alert, oriented, normal speech, no focal findings or movement disorder noted    Medications:    apixaban  5 mg Oral BID    clopidogrel  75 mg Oral Daily  sodium chloride flush  10 mL Intravenous 2 times per day    sodium chloride flush  10 mL Intravenous 2 times per day    aspirin  81 mg Oral Daily    atenolol  25 mg Oral Daily    levothyroxine  100 mcg Oral Daily    atorvastatin  80 mg Oral Nightly    pantoprazole  40 mg Oral QAM AC      sodium chloride 75 mL/hr at 02/08/21 0439    nitroGLYCERIN 5 mcg/min (02/06/21 1144)         sodium chloride flush, 10 mL, PRN      sodium chloride flush, 10 mL, PRN      acetaminophen, 650 mg, Q4H PRN      nitroGLYCERIN, 0.4 mg, Q5 Min PRN      tamsulosin, 0.4 mg, PRN      promethazine, 12.5 mg, Q6H PRN    Or      ondansetron, 4 mg, Q6H PRN      acetaminophen, 650 mg, Q6H PRN    Or      acetaminophen, 650 mg, Q6H PRN      polyethylene glycol, 17 g, Daily PRN      acetaminophen, 1,000 mg, Nightly PRN    And      diphenhydrAMINE, 50 mg, Nightly PRN        Diagnostics:  EKG:  *  08-FEB-2021 02:22:20 [facility] ROUTINE RETRIEVAL  Normal sinus rhythm  Normal ECG  When compared with ECG of 07-FEB-2021 05:22,  No significant change was found  Confirmed by Alisa Callejas MD, Coco Stewart ([id number]) on 2/8/2021 8:28:42 PM    Echo:  Electronically signed by Adrian Mosqueda MD (Interpreting   physician) on 02/08/2021 at 02:40 PM   ----------------------------------------------------------------      Findings      Mitral Valve   The mitral valve structure was normal with normal leaflet separation. DOPPLER: The transmitral velocity was within the normal range with no   evidence for mitral stenosis. There was no evidence of mitral   regurgitation. Aortic Valve   The aortic valve leaflets were not well visualized. Aortic valve appears tricuspid. Aortic valve leaflets are somewhat thickened. Aortic valve leaflets are Mildly calcified. Trivial aortic regurgitation is noted. Tricuspid Valve   Mild tricuspid regurgitation. Pulmonic Valve   The pulmonic valve was not well visualized .    Trivial pulmonic regurgitation visualized. Left Atrium   Left atrial size was normal.      Left Ventricle   Ejection fraction is visually estimated at 55%. Overall left ventricular function is normal.      Right Atrium   Right atrial size was normal.      Right Ventricle   The right ventricular size was normal with normal systolic function and   wall thickness. Pericardial Effusion   The pericardium was normal in appearance with no evidence of a pericardial   effusion. Pleural Effusion   No evidence of pleural effusion. Aorta / Great Vessels   -Aortic root dimension within normal limits.   -The Pulmonary artery is within normal limits. -IVC size is within normal limits with normal respiratory phasic changes. Left Heart Cath:   CORONARY ANGIOGRAM:  LEFT MAIN:  Small, but patent.     LAD:  Has diffuse disease proximally and then previously placed stent in  the proximal mid segment that has significant longitudinal in-stent  restenosis, but still has a patent vessel, approximately 2.5 to 3 mm in  diameter.  Distal LAD has diffuse disease with possible small distal  myocardial bridge seen at the apex.     LCX:  No significant obstruction, gives rise to OM1 and OM2 branches  that are patent.  The OM2 branch has about 40% stenosis in the mid  segment.  AV groove branch has no significant obstruction.     RCA:  90% to 95% stenosis proximally followed by multiple overlapping  stents seen in the mid section with diffuse disease distally,  bifurcating into the PDA and the PLB, both without any significant  obstruction.  PDA is dominant.     INTERVENTION:  Given the findings and presentation, I elected to proceed  with intervention.  I exchanged out for a 6-Sammarinese JR4 guiding catheter  and cannulated the RCA without complication.  Heparin IV was given.   ACT  was confirmed to be above 250 seconds.  I wired the lesion using a  Runthrough wire.  I then predilated the lesion using a 3.5 NC balloon  and then stented the lesion using a 3.5 x 23 Xience Amy ARTHUR at 12  atmospheres and I postdilated the stent with a 4.0 x 15 NC balloon up to  20 atmospheres.  Post-PCI angiography demonstrated SHANNAN-3 flow without  any perforation, dissection, distal embolization, side branch loss, or  guide or guidewire-induced trauma.  All equipments were removed from the  patient.  The patient tolerated the procedure well.     IMMEDIATE COMPLICATIONS:  None.     MEDICATIONS:  See EMR.     ACCESS:  Vasc Band was used for hemostasis.     ESTIMATED BLOOD LOSS:  Less than 10 mL.     SUMMARY:  Successful PCI of the RCA with one drug-eluting stent.     PLAN:  1.  DAPT. 2.  Optimal medical therapy. 3.  Risk factor management. 4.  Routine access site care. 5.  Bedrest.  6.  IV fluids. 7.  Follow up with myself or primary cardiologist in one to two weeks  postprocedure.     All the above was explained to the patient and the patient's family. They were agreeable and amenable to the plan.           Lorena Cervantes MD    Lab Data:    Cardiac Enzymes:  No results for input(s): CKTOTAL, CKMB, CKMBINDEX, TROPONINI in the last 72 hours.     CBC:   Lab Results   Component Value Date    WBC 7.2 02/08/2021    RBC 4.97 02/08/2021    RBC 5.43 10/08/2015    HGB 15.5 02/08/2021    HCT 45.0 02/08/2021     02/08/2021       CMP:    Lab Results   Component Value Date     02/08/2021    K 3.8 02/08/2021     02/08/2021    CO2 27 02/08/2021    BUN 19 02/08/2021    CREATININE 1.1 02/08/2021    LABGLOM 66 02/08/2021    GLUCOSE 110 02/08/2021    CALCIUM 9.3 02/08/2021       Hepatic Function Panel:    Lab Results   Component Value Date    ALKPHOS 54 12/02/2020    ALT 30 12/02/2020    AST 26 12/02/2020    PROT 7.5 12/02/2020    PROT 6.8 08/27/2013    BILITOT 1.2 12/02/2020    BILIDIR <0.2 12/02/2020    LABALBU 4.4 12/02/2020    LABALBU 4.5 10/13/2011       Magnesium:    Lab Results   Component Value Date    MG 2.0 10/10/2019       PT/INR:    Lab Results Component Value Date    INR 1.06 02/08/2021       HgBA1c:  No results found for: LABA1C    FLP:    Lab Results   Component Value Date    TRIG 609 02/08/2021    HDL 31 02/08/2021    LDLCALC SEE BELOW 02/08/2021       TSH:    Lab Results   Component Value Date    TSH 2.690 09/09/2020         Assessment:    Pina    S\p cardiac cath 2/8/2021: s\p PCI RCA    Prior Hx CAD / PCTA    HTN  HLP - hypertrigs    Hx AFB - Currently SR    On eliquis as OP        Plan:  · Ok to DC   · TAPT w/ asa - eliquis- plavix   · Stop asa in 1 month then continue eliquis-plavix  · Follow as OP         Electronically signed by Constance Soulier, APRN - CNP on 2/9/2021 at 9:12 AM

## 2021-02-09 NOTE — PLAN OF CARE
Problem: Pain:  Goal: Pain level will decrease  Description: Pain level will decrease  Outcome: Completed  Goal: Control of acute pain  Description: Control of acute pain  Outcome: Completed  Goal: Control of chronic pain  Description: Control of chronic pain  Outcome: Completed   Care plan reviewed with patient and wife. Patient and wife verbalize understanding of the plan of care and contribute to goal setting.

## 2021-02-10 ENCOUNTER — TELEPHONE (OUTPATIENT)
Dept: FAMILY MEDICINE CLINIC | Age: 71
End: 2021-02-10

## 2021-02-10 NOTE — TELEPHONE ENCOUNTER
[facility] Transitions Initial Follow Up Call    Outreach made within 2 business days of discharge: Yes    Patient: Dalila Apley Patient : 1950   MRN: [id number]  Reason for Admission: There are no discharge diagnoses documented for the most recent discharge. Discharge Date: 21       Spoke with: attempted to contact pt LVM for him to call the office.      Discharge department/facility: [facility]       Scheduled appointment with PCP within 7-14 days    Follow Up  Future Appointments   Date Time Provider Dillon Jacome   2021 11:00 AM BASHIR Estrada   3/9/2021 10:00 AM Littie Cushing, APRN - [facility] [address]   2021  9:45 AM Nathaniel Carney MD N [facility] - [address] [facility] ([address] [place] [address])

## 2021-02-11 NOTE — TELEPHONE ENCOUNTER
[facility] Transitions Initial Follow Up Call    Outreach made within 2 business days of discharge: Yes    Patient: Ace Cheek Patient : 1950   MRN: [id number]  Reason for Admission: There are no discharge diagnoses documented for the most recent discharge. Discharge Date: 21       Spoke with: Patient      Discharge department/facility: [facility] Interactive Patient Contact:  Was patient able to fill all prescriptions: Yes  Was patient instructed to bring all medications to the follow-up visit: Yes  Is patient taking all medications as directed in the discharge summary?  Yes  Does patient understand their discharge instructions: Yes  Does patient have questions or concerns that need addressed prior to 7-14 day follow up office visit: no    Scheduled appointment with PCP within 7-14 days    Follow Up  Future Appointments   Date Time Provider Dillon Jacome   2021 11:00 AM Radha Stover, [address] [place] [address]   3/9/2021 10:00 AM BASHIR Cohen - CNP N [facility] - [facility]   2021  9:45 AM Jesus Pollard MD N [facility] - [address] [place] [address] ([address] [place] [address])

## 2021-02-16 ENCOUNTER — VIRTUAL VISIT (OUTPATIENT)
Dept: FAMILY MEDICINE CLINIC | Age: 71
End: 2021-02-16
Payer: MEDICARE

## 2021-02-16 DIAGNOSIS — I20.0 UNSTABLE ANGINA (HCC): Primary | ICD-10-CM

## 2021-02-16 DIAGNOSIS — I73.9 PAD (PERIPHERAL ARTERY DISEASE) (HCC): ICD-10-CM

## 2021-02-16 DIAGNOSIS — Z95.820 S/P ANGIOPLASTY WITH STENT: ICD-10-CM

## 2021-02-16 DIAGNOSIS — E78.5 DYSLIPIDEMIA: ICD-10-CM

## 2021-02-16 DIAGNOSIS — E78.1 HYPERTRIGLYCERIDEMIA: ICD-10-CM

## 2021-02-16 DIAGNOSIS — I25.110 CORONARY ARTERY DISEASE INVOLVING NATIVE CORONARY ARTERY OF NATIVE HEART WITH UNSTABLE ANGINA PECTORIS (HCC): ICD-10-CM

## 2021-02-16 DIAGNOSIS — I48.91 ATRIAL FIBRILLATION WITH RVR (HCC): ICD-10-CM

## 2021-02-16 PROCEDURE — 99496 TRANSJ CARE MGMT HIGH F2F 7D: CPT | Performed by: NURSE PRACTITIONER

## 2021-02-16 PROCEDURE — 1111F DSCHRG MED/CURRENT MED MERGE: CPT | Performed by: NURSE PRACTITIONER

## 2021-02-16 ASSESSMENT — ENCOUNTER SYMPTOMS
COUGH: 0
NAUSEA: 0
ABDOMINAL PAIN: 0
ALLERGIC/IMMUNOLOGIC NEGATIVE: 1
GASTROINTESTINAL NEGATIVE: 1
SHORTNESS OF BREATH: 0
RESPIRATORY NEGATIVE: 1

## 2021-02-16 NOTE — PROGRESS NOTES
Post-Discharge Transitional Care Management Services or Hospital Follow Up      Adriel Melo   YOB: 1950    Date of Office Visit:  2/16/2021  Date of Hospital Admission: 2/6/21  Date of Hospital Discharge: 2/9/21  Readmission Risk Score(high >=14%. Medium >=10%):Readmission Risk Score: 12      Care management risk score Rising risk (score 2-5) and Complex Care (Scores >=6): 1     Non face to face  following discharge, date last encounter closed (first attempt may have been earlier): 2/11/2021  8:25 AM 2/11/2021  8:25 AM    Call initiated 2 business days of discharge: Yes     Patient Active Problem List   Diagnosis    Coronary artery disease involving native coronary artery of native heart with unstable angina pectoris ([facility] [address]    Hypertension    Hyperlipidemia    Sleep apnea    Hemorrhoids    GERD (gastroesophageal reflux disease)    Pharyngitis, acute    Back pain    Urinary frequency    Hematuria, undiagnosed cause    OAB (overactive bladder)    BPH associated with nocturia    Tracheobronchitis    Cervical muscle pain    Acute cervical myofascial strain, secondary to fall    DDD (degenerative disc disease), cervical    Skin tag, buttocks.     Chest pain    Angina effort    Dyslipidemia    S/P angioplasty with stent    Erectile dysfunction due to arterial insufficiency    Primary insomnia    Medication monitoring encounter    Bilateral tinnitus    Acquired hypothyroidism    Atrial fibrillation with RVR (HCC)    Atrial flutter (HCC)    PAD (peripheral artery disease) (HCC)    Unstable angina (HCC)       Allergies   Allergen Reactions    Crestor [Rosuvastatin]      Muscle aches    Tape [Adhesive Tape] Rash     Blisters and red       Medications listed as ordered at the time of discharge from hospital   Daniela ARITA   Home Medication Instructions APRIL:    Printed on:02/16/21 1113   Medication Information                      apixaban (ELIQUIS) 5 MG TABS tablet TAKE 1 TABLET BY MOUTH EVERY 12 HOURS             Ascorbic Acid (VITAMIN C PO)  Take 1,000 mg by mouth daily              aspirin 81 MG chewable tablet  Take 1 tablet by mouth daily Take for 30 days then stop             atenolol (TENORMIN) 25 MG tablet  Take 1 tablet by mouth daily             atorvastatin (LIPITOR) 80 MG tablet  Take 1 tablet by mouth nightly             clopidogrel (PLAVIX) 75 MG tablet  Take 1 tablet by mouth once daily             Coenzyme Q10 100 MG CAPS  Take 100 mg by mouth daily. diphenhydrAMINE-APAP, sleep, (TYLENOL PM EXTRA STRENGTH)  MG tablet  Take 2 tablets by mouth nightly as needed for Sleep             Evolocumab 140 MG/ML SOAJ  Inject 140 mg into the skin every 14 days             fenofibrate (TRIGLIDE) 160 MG tablet  Take 1 tablet by mouth daily             Multiple Vitamins-Minerals (THERAPEUTIC MULTIVITAMIN-MINERALS) tablet  Take 1 tablet by mouth daily             nitroGLYCERIN (NITROSTAT) 0.4 MG SL tablet  DISSOLVE ONE TABLET UNDER THE TONGUE EVERY 5 MINUTES AS NEEDED FOR CHEST PAIN. DO NOT EXCEED A TOTAL OF 3 DOSES IN 15 MINUTES             Omeprazole (PRILOSEC PO)  Take 10 mg by mouth daily              SYNTHROID 100 MCG tablet  Take 1 tablet by mouth daily Take with water on an empty stomach- wait 30 minutes before eating or taking other meds. tamsulosin (FLOMAX) 0.4 MG capsule  Take 1 capsule by mouth nightly             valsartan-hydrochlorothiazide (DIOVAN-HCT) 160-12.5 MG per tablet  Take 1 tablet by mouth 2 times daily                   Medications marked \"taking\" at this time  No outpatient medications have been marked as taking for the 2/16/21 encounter (Virtual Visit) with BASHIR Ordoñez CNP.         Medications patient taking as of now reconciled against medications ordered at time of hospital discharge: Yes    Chief Complaint   Patient presents with    Follow-Up from Hospital       HPI Inpatient course: Discharge summary reviewed- see chart.       Admit date  2/6/2021               Discharge date:  2/9/2021 12:34 PM     Chief Complaint on presentation :-  Chest pain      Discharge Assessment and Plan:-   1. Unstable Angina: Patient was admitted to the hospital for chest pain with a cardiac history of CAD, Afib, HTN, and HLD. Cardiology was consulted and Dr. Aurora Sanchez (cardiology) assessed him. Patient had cath on 02/8 with PCI of the RCAx1 performed by Dr. Wally Gutierrez (cardiology). Patient tolerated the procedure well. DAPT with Aspirin, Eliquis, and Plavix. Pt should stop Aspirin in one month but continue Eliquis and Plavix. SI NTG prescription given. Follow up with Dr. Nanette Guerra in two weeks for further care. 2. Atrial Fibrillation, paroxysmal: Pt in NSR upon discharge. Restart Eliquis. 3. HTN: Continue Atenolol    4. HLD: Continue statin.   5. Hypertriglyceridemia: Pt states that this has been an ongoing issue for him. He takes medications for it and follows with PCP. TGs 555.   6. Hypothryoidism: Continue Synthroid   7. GERD: Continue Protonix.      Initial H and P and Hospital course:-  \"James Waters carlito 72 y. o. male who presents to the ED for evaluation of chest pain. Patient states that he's had 2 episodes of chest pain last night and 2 more episodes of chest pain at 7:30am and 9:30am today. He took SL nitro with each episode and reports that pain was completely resolved within 2 minutes of each occurrence. He states that the pain is dull and radiates to his jaw. Patient rated pain 5/10 prior to nitro, and 0/10 after nitro administration. He denies any history of MI, but has had several stents placed. He denies any back pain, headache, diaphoresis, SOB, abdominal pain, nausea, or vomiting. Patient states that he is not currently experiencing any pain. \"     02/6: Pt was admitted to the hospital for unstable angina. Cardiology was consulted.  Heparin and NTG drip were started.     02/7: Dr. Jevon Cuellar (cardiology) assessed patient. Discussed plan for cath with possible stent. Continued Aspirin, plavix, and atenolol. Continue Heparin. Plan for catheterization on 2/8.      02/8: Patient had cath with PCI of the RCAx1 performed by Dr. Leora Corado (cardiology). Patient tolerated the procedure well. Continue Aspirin, Brilinta, statin, and BB. Patient in stable condition and asymptomatic.      02/9: Patient doing well today and ready for discharge home. He notes that he has been asymptomatic through the night. Denied CP, SOB, abdominal pain, or HA. Discharged home with instructions for DAPT with Aspirin, Eliquis, and Plavix. Pt should stop Aspirin in one month but continue Eliquis and Plavix. SI NTG prescribed. Follow up with Dr. Landen Patel in 2 weeks for reevaluation. Review of Systems   Constitutional: Positive for fatigue. Negative for chills and fever. HENT: Negative. Respiratory: Negative. Negative for cough and shortness of breath. Cardiovascular: Negative. Negative for chest pain, palpitations and leg swelling. Gastrointestinal: Negative. Negative for abdominal pain and nausea. Endocrine: Negative for cold intolerance. Musculoskeletal: Negative. Skin: Negative for rash. Allergic/Immunologic: Negative. Neurological: Negative. Negative for dizziness, light-headedness and headaches. Psychiatric/Behavioral: Negative. All other systems reviewed and are negative. There were no vitals filed for this visit. There is no height or weight on file to calculate BMI. Wt Readings from Last 3 Encounters:   02/06/21 220 lb (99.8 kg)   11/04/20 217 lb 12.8 oz (98.8 kg)   12/30/19 218 lb 12.8 oz (99.2 kg)     BP Readings from Last 3 Encounters:   02/09/21 (!) 148/76   11/04/20 130/68   12/30/19 138/80       Physical Exam  Constitutional:       General: He is not in acute distress. Appearance: He is not ill-appearing.    Pulmonary: Effort: Pulmonary effort is normal. No respiratory distress. Neurological:      Mental Status: He is alert and oriented to person, place, and time. Psychiatric:         Mood and Affect: Mood normal.         Behavior: Behavior normal.         Assessment/Plan:  1. Unstable angina ([address]    2. S/P angioplasty with stent    3. Coronary artery disease involving native coronary artery of native heart with unstable angina pectoris ([address]    4. PAD (peripheral artery disease) ([facility] [address]    5. Atrial fibrillation with RVR ([address]    6. Dyslipidemia    7. Hypertriglyceridemia        MDM: Tolerating STATIN and FENOFIBRATE. TRIG > 600. NO issues with CATH site. Follow up with CARDIO 3/9/21. Repeat LIPID 3 months.   RTO PRN    Medical Decision Making: high complexity

## 2021-02-26 ENCOUNTER — TELEPHONE (OUTPATIENT)
Dept: FAMILY MEDICINE CLINIC | Age: 71
End: 2021-02-26

## 2021-02-26 DIAGNOSIS — I20.0 UNSTABLE ANGINA (HCC): Primary | ICD-10-CM

## 2021-02-26 RX ORDER — PRAVASTATIN SODIUM 80 MG/1
80 TABLET ORAL DAILY
Qty: 30 TABLET | Refills: 0 | Status: SHIPPED | OUTPATIENT
Start: 2021-02-26 | End: 2021-03-04

## 2021-03-09 ENCOUNTER — OFFICE VISIT (OUTPATIENT)
Dept: CARDIOLOGY CLINIC | Age: 71
End: 2021-03-09
Payer: MEDICARE

## 2021-03-09 VITALS
DIASTOLIC BLOOD PRESSURE: 62 MMHG | BODY MASS INDEX: 29.72 KG/M2 | WEIGHT: 207.6 LBS | HEIGHT: 70 IN | SYSTOLIC BLOOD PRESSURE: 122 MMHG | HEART RATE: 62 BPM

## 2021-03-09 DIAGNOSIS — I73.9 PAD (PERIPHERAL ARTERY DISEASE) (HCC): ICD-10-CM

## 2021-03-09 DIAGNOSIS — I48.0 PAF (PAROXYSMAL ATRIAL FIBRILLATION) (HCC): ICD-10-CM

## 2021-03-09 DIAGNOSIS — E78.01 FAMILIAL HYPERCHOLESTEROLEMIA: ICD-10-CM

## 2021-03-09 DIAGNOSIS — Z95.820 S/P ANGIOPLASTY WITH STENT: ICD-10-CM

## 2021-03-09 DIAGNOSIS — I25.10 CORONARY ARTERY DISEASE INVOLVING NATIVE CORONARY ARTERY OF NATIVE HEART WITHOUT ANGINA PECTORIS: ICD-10-CM

## 2021-03-09 DIAGNOSIS — I10 ESSENTIAL HYPERTENSION: ICD-10-CM

## 2021-03-09 PROCEDURE — 93000 ELECTROCARDIOGRAM COMPLETE: CPT | Performed by: NURSE PRACTITIONER

## 2021-03-09 PROCEDURE — G8427 DOCREV CUR MEDS BY ELIG CLIN: HCPCS | Performed by: NURSE PRACTITIONER

## 2021-03-09 PROCEDURE — 1111F DSCHRG MED/CURRENT MED MERGE: CPT | Performed by: NURSE PRACTITIONER

## 2021-03-09 PROCEDURE — 1036F TOBACCO NON-USER: CPT | Performed by: NURSE PRACTITIONER

## 2021-03-09 PROCEDURE — G8417 CALC BMI ABV UP PARAM F/U: HCPCS | Performed by: NURSE PRACTITIONER

## 2021-03-09 PROCEDURE — 3017F COLORECTAL CA SCREEN DOC REV: CPT | Performed by: NURSE PRACTITIONER

## 2021-03-09 PROCEDURE — 4040F PNEUMOC VAC/ADMIN/RCVD: CPT | Performed by: NURSE PRACTITIONER

## 2021-03-09 PROCEDURE — 1123F ACP DISCUSS/DSCN MKR DOCD: CPT | Performed by: NURSE PRACTITIONER

## 2021-03-09 PROCEDURE — G8484 FLU IMMUNIZE NO ADMIN: HCPCS | Performed by: NURSE PRACTITIONER

## 2021-03-09 PROCEDURE — 99213 OFFICE O/P EST LOW 20 MIN: CPT | Performed by: NURSE PRACTITIONER

## 2021-03-09 NOTE — PROGRESS NOTES
[facility] PROFESSIONAL SERVICES  [facility]   [address] [facility]   [address] [address]   Dept: [phone]   Dept Fax: [phone]   Loc: [phone]      Chief Complaint   Patient presents with    Coronary Artery Disease    Hypertension     F/U from chest pain with LHC/PCI/ARTHUR of RCA in 73 y/o male with history of CAD, AFB, HTN, dyslipidemia, GERD. Denies chest pain, palpitations, sob, JOLIE, lightheadedness, dizziness or syncope. No bleeding issues with DAPT. Cardiologist:  Dr. Barry Gomez:   No fever, no chills, No fatigue or weight loss  Pulmonary:    No dyspnea, no wheezing  Cardiac:    Denies recent chest pain   GI:     No nausea or vomiting, no abdominal pain  Neuro:    No dizziness or light headedness  Musculoskeletal:  No recent active issues  Extremities:   No edema, good peripheral pulses      Past Medical History:   Diagnosis Date    Acquired hypothyroidism 2/21/2019    CAD (coronary artery disease)     Cancer ([facility] [address]     SKIN    DDD (degenerative disc disease), cervical 4/10/2017    GERD (gastroesophageal reflux disease)     Hyperlipidemia     Hypertension        Allergies   Allergen Reactions    Crestor [Rosuvastatin]      Muscle aches    Tape [Adhesive Tape] Rash     Blisters and red       Current Outpatient Medications   Medication Sig Dispense Refill    aspirin 81 MG chewable tablet Take 1 tablet by mouth daily Take for 30 days then stop 30 tablet 0    fenofibrate (TRIGLIDE) 160 MG tablet Take 1 tablet by mouth daily 30 tablet 3    nitroGLYCERIN (NITROSTAT) 0.4 MG SL tablet DISSOLVE ONE TABLET UNDER THE TONGUE EVERY 5 MINUTES AS NEEDED FOR CHEST PAIN. DO NOT EXCEED A TOTAL OF 3 DOSES IN 15 MINUTES 25 tablet 3    SYNTHROID 100 MCG tablet Take 1 tablet by mouth daily Take with water on an empty stomach- wait 30 minutes before eating or taking other meds.  30 tablet 5    atenolol (TENORMIN) 25 MG tablet Take 1 tablet by mouth daily 90 tablet 1    Evolocumab 140 MG/ML SOAJ Inject 140 mg into the skin every 14 days 6 pen 3    clopidogrel (PLAVIX) 75 MG tablet Take 1 tablet by mouth once daily 90 tablet 3    apixaban (ELIQUIS) 5 MG TABS tablet TAKE 1 TABLET BY MOUTH EVERY 12 HOURS 180 tablet 3    valsartan-hydrochlorothiazide (DIOVAN-HCT) 160-12.5 MG per tablet Take 1 tablet by mouth 2 times daily 180 tablet 3    tamsulosin (FLOMAX) 0.4 MG capsule Take 1 capsule by mouth nightly (Patient taking differently: Take 0.4 mg by mouth as needed ) 30 capsule 5    diphenhydrAMINE-APAP, sleep, (TYLENOL PM EXTRA STRENGTH)  MG tablet Take 2 tablets by mouth nightly as needed for Sleep      Omeprazole (PRILOSEC PO) Take 10 mg by mouth daily       Multiple Vitamins-Minerals (THERAPEUTIC MULTIVITAMIN-MINERALS) tablet Take 1 tablet by mouth daily      Ascorbic Acid (VITAMIN C PO) Take 1,000 mg by mouth daily       Coenzyme Q10 100 MG CAPS Take 100 mg by mouth daily. No current facility-administered medications for this visit.         Social History     Socioeconomic History    Marital status:      Spouse name: Whitman Blizzard Number of children: 2    Years of education: None    Highest education level: None   Occupational History    None   Social Needs    Financial resource strain: None    Food insecurity     Worry: None     Inability: None    Transportation needs     Medical: None     Non-medical: None   Tobacco Use    Smoking status: Former Smoker     Packs/day: 2.00     Years: 20.00     Pack years: 40.00     Types: Cigarettes     Quit date: 1992     Years since quittin.8    Smokeless tobacco: Never Used   Substance and Sexual Activity    Alcohol use: Yes     Comment: glass red wine daily    Drug use: No    Sexual activity: None   Lifestyle    Physical activity     Days per week: None     Minutes per session: None    Stress: None   Relationships    Social connections     Talks on phone: None     Gets together: None Attends Evangelical service: None     Active member of club or organization: None     Attends meetings of clubs or organizations: None     Relationship status: None    Intimate partner violence     Fear of current or ex partner: None     Emotionally abused: None     Physically abused: None     Forced sexual activity: None   Other Topics Concern    None   Social History Narrative    None       Family History   Problem Relation Age of Onset    High Blood Pressure Mother     High Blood Pressure Father     Diabetes Sister     Heart Disease Maternal Grandmother     High Blood Pressure Maternal Grandmother     High Blood Pressure Maternal Grandfather     High Blood Pressure Paternal Grandmother     High Blood Pressure Paternal Grandfather     Diabetes Brother        Blood pressure 122/62, pulse 62, height 5' 9.5\" (1.765 m), weight 207 lb 9.6 oz (94.2 kg). General:   Well developed, well nourished  Lungs:   Clear to auscultation  Heart:    Normal S1 S2, No murmur, rubs, or gallops  Abdomen:   Soft, non tender, no organomegalies, positive bowel sounds  Extremities:   No edema, no cyanosis, good peripheral pulses  Neurological:   Awake, alert, oriented. No obvious focal deficits  Musculoskeletal:  No obvious deformities    EKG: 3/9/21  SR, nonspecific TW abnormality, no acute abnormalities    Echo: 2/8/21  Summary   Ejection fraction is visually estimated at 55%. Overall left ventricular function is normal.   The aortic valve leaflets were not well visualized. Aortic valve appears tricuspid. Aortic valve leaflets are somewhat thickened. Aortic valve leaflets are Mildly calcified. Trivial aortic regurgitation is noted.       Signature      ----------------------------------------------------------------   Electronically signed by Willis Thurman MD    [facility]: 2/8/21  CORONARY ANGIOGRAM:  LEFT MAIN:  Small, but patent.     LAD:  Has diffuse disease proximally and then previously placed stent in  the proximal mid segment that has significant longitudinal in-stent  restenosis, but still has a patent vessel, approximately 2.5 to 3 mm in  diameter. Distal LAD has diffuse disease with possible small distal  myocardial bridge seen at the apex.     LCX:  No significant obstruction, gives rise to OM1 and OM2 branches  that are patent. The OM2 branch has about 40% stenosis in the mid  segment. AV groove branch has no significant obstruction.     RCA:  90% to 95% stenosis proximally followed by multiple overlapping  stents seen in the mid section with diffuse disease distally,  bifurcating into the PDA and the PLB, both without any significant  obstruction. PDA is dominant.     INTERVENTION:  Given the findings and presentation, I elected to proceed  with intervention. I exchanged out for a 6-Turkmen JR4 guiding catheter  and cannulated the RCA without complication. Heparin IV was given. ACT  was confirmed to be above 250 seconds. I wired the lesion using a  Runthrough wire. I then predilated the lesion using a 3.5 NC balloon  and then stented the lesion using a 3.5 x 23 Xience Amy ARTHUR at 12  atmospheres and I postdilated the stent with a 4.0 x 15 NC balloon up to  20 atmospheres. Post-PCI angiography demonstrated SHANNAN-3 flow without  any perforation, dissection, distal embolization, side branch loss, or  guide or guidewire-induced trauma. All equipments were removed from the  patient. The patient tolerated the procedure well.     IMMEDIATE COMPLICATIONS:  None.     MEDICATIONS:  See EMR.     ACCESS:  Vasc Band was used for hemostasis.     ESTIMATED BLOOD LOSS:  Less than 10 mL.     SUMMARY:  Successful PCI of the RCA with one drug-eluting stent.     PLAN:  1. DAPT. 2.  Optimal medical therapy. 3.  Risk factor management. 4.  Routine access site care. 5.  Bedrest.  6.  IV fluids.   7.  Follow up with myself or primary cardiologist in one to two weeks  postprocedure.     All the above was explained to the patient and the patient's family. They were agreeable and amenable to the plan.           Shani Zhang MD     Diagnosis Orders   1. S/P angioplasty with stent  EKG 12 lead   2. Coronary artery disease involving native coronary artery of native heart without angina pectoris  EKG 12 lead   3. Essential hypertension  EKG 12 lead   4. PAD (peripheral artery disease) ([facility] [address]     5. Familial hypercholesterolemia     6. PAF (paroxysmal atrial fibrillation) ([facility] [address]  EKG 12 lead       Orders Placed This Encounter   Procedures    EKG 12 lead     Order Specific Question:   Reason for Exam?     Answer: Other   F/U [facility] Successful PCI of the RCA with one drug-eluting stent: Denies chest pain, palpitations, sob, JOLIE, lightheadedness, dizziness or syncope. Known CAD with prior PCI/stenting  EF  55  HTN: controlled  HLD  H/o AFL  Not taking asa with plavix and eliquis  Refusing cardiac rehab  On atenolol, plavix, eliquis, Diovan-HCT   Statin intolerance: on Evolocumab, triglide  Following with PCP for lipids and hepatic panel. Discussed use, benefit, and side effects of prescribed medications. All patient questions answered. Pt voiced understanding. Instructed to continue current medications, diet and exercise. Continue risk factor modification and medical management. Patient agreed with treatment plan. Follow up as directed.     Continue Dr Lonny Murray current treatment plan  Follow up with Dr Mark Flower as scheduled or sooner if needed

## 2021-03-17 ENCOUNTER — TELEPHONE (OUTPATIENT)
Dept: FAMILY MEDICINE CLINIC | Age: 71
End: 2021-03-17

## 2021-03-17 DIAGNOSIS — E78.1 HYPERTRIGLYCERIDEMIA: Primary | ICD-10-CM

## 2021-03-17 DIAGNOSIS — R73.01 IFG (IMPAIRED FASTING GLUCOSE): ICD-10-CM

## 2021-03-17 NOTE — TELEPHONE ENCOUNTER
Patient called in requesting lab orders Lipid Panel    What are the labs for: To check Triglycerides    Does the patient need orders faxed  Yes to [facility] lab    Does patient need a follow up phone call?   [phone]  Verified Phone Number: [phone]

## 2021-03-24 ENCOUNTER — HOSPITAL ENCOUNTER (OUTPATIENT)
Age: 71
Discharge: HOME | End: 2021-03-24
Payer: MEDICARE

## 2021-03-24 DIAGNOSIS — E78.1 HYPERTRIGLYCERIDEMIA: ICD-10-CM

## 2021-03-24 DIAGNOSIS — R73.01 IFG (IMPAIRED FASTING GLUCOSE): ICD-10-CM

## 2021-03-24 LAB
AVERAGE GLUCOSE: 117 MG/DL (ref 70–126)
CHOLESTEROL, TOTAL: 158 MG/DL (ref 100–199)
HBA1C MFR BLD: 5.9 % (ref 4.4–6.4)
HDLC SERPL-MCNC: 37 MG/DL
LDL CHOLESTEROL CALCULATED: 52 MG/DL
LDL CHOLESTEROL DIRECT: 76.54 MG/DL
TRIGL SERPL-MCNC: 344 MG/DL (ref 0–199)

## 2021-03-24 PROCEDURE — 36415 COLL VENOUS BLD VENIPUNCTURE: CPT

## 2021-03-24 PROCEDURE — 80061 LIPID PANEL: CPT

## 2021-03-24 PROCEDURE — 83036 HEMOGLOBIN GLYCOSYLATED A1C: CPT

## 2021-03-24 PROCEDURE — 83721 ASSAY OF BLOOD LIPOPROTEIN: CPT

## 2021-03-31 ENCOUNTER — VIRTUAL VISIT (OUTPATIENT)
Dept: FAMILY MEDICINE CLINIC | Age: 71
End: 2021-03-31
Payer: MEDICARE

## 2021-03-31 DIAGNOSIS — J01.40 ACUTE NON-RECURRENT PANSINUSITIS: Primary | ICD-10-CM

## 2021-03-31 PROCEDURE — 3017F COLORECTAL CA SCREEN DOC REV: CPT | Performed by: NURSE PRACTITIONER

## 2021-03-31 PROCEDURE — 1123F ACP DISCUSS/DSCN MKR DOCD: CPT | Performed by: NURSE PRACTITIONER

## 2021-03-31 PROCEDURE — 99213 OFFICE O/P EST LOW 20 MIN: CPT | Performed by: NURSE PRACTITIONER

## 2021-03-31 PROCEDURE — 4040F PNEUMOC VAC/ADMIN/RCVD: CPT | Performed by: NURSE PRACTITIONER

## 2021-03-31 PROCEDURE — G8428 CUR MEDS NOT DOCUMENT: HCPCS | Performed by: NURSE PRACTITIONER

## 2021-03-31 RX ORDER — AMOXICILLIN AND CLAVULANATE POTASSIUM 875; 125 MG/1; MG/1
1 TABLET, FILM COATED ORAL 2 TIMES DAILY WITH MEALS
Qty: 14 TABLET | Refills: 0 | Status: SHIPPED | OUTPATIENT
Start: 2021-03-31 | End: 2021-04-07

## 2021-03-31 ASSESSMENT — ENCOUNTER SYMPTOMS
COUGH: 0
SINUS PRESSURE: 1
ABDOMINAL PAIN: 0
RHINORRHEA: 1
SINUS PAIN: 1
TROUBLE SWALLOWING: 0
SORE THROAT: 0
SHORTNESS OF BREATH: 0
NAUSEA: 0

## 2021-03-31 NOTE — PROGRESS NOTES
Subjective:      Patient ID: Trenton Avina is a 80 y.o. male    HPI: Acute for sinuses    TELEHEALTH EVALUATION -- Audio/Visual (During YSVYT-02 public health emergency)    Patient identification was verified at the start of the visit: Yes    Services were provided through a video synchronous discussion virtually to substitute for in-person clinic visit. Patient and provider were located at their individual homes. Patient has requested an audio/video evaluation for the following concern(s):    Chief Complaint   Patient presents with    Sinus Problem       Onset of 1 week with sinus congestion, HA, PND. Sinus pressure. Runny nose. Bloody nose. Color drainage. Symptoms are not improving. No body aches, fever, chills or fatigue. No allergies. Recent CARDIAC STENT placed 2 months ago. Patient Active Problem List   Diagnosis    Coronary artery disease involving native coronary artery of native heart with unstable angina pectoris ([facility] [address]    Hypertension    Hyperlipidemia    Sleep apnea    Hemorrhoids    GERD (gastroesophageal reflux disease)    Pharyngitis, acute    Back pain    Urinary frequency    Hematuria, undiagnosed cause    OAB (overactive bladder)    BPH associated with nocturia    Tracheobronchitis    Cervical muscle pain    Acute cervical myofascial strain, secondary to fall    DDD (degenerative disc disease), cervical    Skin tag, buttocks.  Chest pain    Angina effort    Dyslipidemia    S/P angioplasty with stent    Erectile dysfunction due to arterial insufficiency    Primary insomnia    Medication monitoring encounter    Bilateral tinnitus    Acquired hypothyroidism    Atrial fibrillation with RVR (HCC)    Atrial flutter (HCC)    PAD (peripheral artery disease) (HCC)    Unstable angina (HCC)       Review of Systems   Constitutional: Negative for chills, fatigue and fever.    HENT: Positive for congestion, nosebleeds, postnasal drip, rhinorrhea, sinus pressure and sinus pain. Negative for sore throat and trouble swallowing. Respiratory: Negative for cough and shortness of breath. Cardiovascular: Negative for chest pain. Gastrointestinal: Negative for abdominal pain and nausea. Skin: Negative for rash. Allergic/Immunologic: Negative for environmental allergies. Neurological: Positive for headaches. Negative for dizziness and light-headedness. Psychiatric/Behavioral: Negative. Objective:   Physical Exam  Constitutional:       General: He is not in acute distress. Appearance: He is not ill-appearing. Pulmonary:      Effort: Pulmonary effort is normal. No respiratory distress. Neurological:      Mental Status: He is alert and oriented to person, place, and time. Psychiatric:         Mood and Affect: Mood normal.         Behavior: Behavior normal.         Assessment:       Diagnosis Orders   1. Acute non-recurrent pansinusitis  amoxicillin-clavulanate (AUGMENTIN) 875-125 MG per tablet       Plan:     Orders as above   Fluids and rest  RTO if symptoms worsen or stay the same             Due to this being a TeleHealth encounter ([facility]), evaluation of the following organ systems was limited: Vitals/Constitutional/EENT/Resp/CV/GI//MS/Neuro/Skin/Heme-Lymph-Imm. Pursuant to the emergency declaration under the [facility] [address] authority and the [employer] Act, this Virtual Visit was conducted with patient's (and/or legal guardian's) consent, to reduce the patient's risk of exposure to COVID-19 and provide necessary medical care. The patient (and/or legal guardian) has also been advised to contact this office for worsening conditions or problems, and seek emergency medical treatment and/or call 911 if deemed necessary.     --BASHIR Awad - CNP on 3/31/2021 at 11:31 AM    An electronic signature was used to authenticate this note.      3/31/2021

## 2021-05-03 ENCOUNTER — TELEPHONE (OUTPATIENT)
Dept: FAMILY MEDICINE CLINIC | Age: 71
End: 2021-05-03

## 2021-05-03 NOTE — TELEPHONE ENCOUNTER
Patient continue with symptoms or seen over the weekend? If symptoms and still needs treatment scheduled appt to obtain urine.

## 2021-05-03 NOTE — TELEPHONE ENCOUNTER
Spoke with patient and denies s/s at this time. He took some left over ATBs he had and Flomax OTW and s/s are gone.

## 2021-05-03 NOTE — TELEPHONE ENCOUNTER
----- Message from Milena July sent at 4/30/2021  1:16 PM EDT -----  Subject: Referral Request    QUESTIONS   Reason for referral request? Patient is having frequency/discomfort,   thinks he may have a bladder infection. He would like a prescription   called in for this. Did not meet symptoms to triage, christine green. Has the physician seen you for this condition before? No   Preferred Specialist (if applicable)? Do you already have an appointment scheduled? No  Additional Information for Provider? He would like it sent to [facility] in [address] [place] [address]  ---------------------------------------------------------------------------  --------------  [facility] [address]  What is the best way for the office to contact you? OK to leave message on   voicemail  Preferred Call Back Phone Number?  [phone]

## 2021-05-13 ENCOUNTER — TELEPHONE (OUTPATIENT)
Dept: FAMILY MEDICINE CLINIC | Age: 71
End: 2021-05-13

## 2021-05-13 NOTE — TELEPHONE ENCOUNTER
Pt called with a 4 day hx of a headache at its worst on Wednesday 5/12/21. Headache is constant all over the head, pt had memory concerns (not able to remember passwords, login's) DENIES dizziness or vision changes. Pt states the headache is better today BUT not gone, able to remember things better. Pt has been taking Tylenol prn. Pharmacy is [facility]. Pt would like a call back with further recommendations.

## 2021-05-13 NOTE — TELEPHONE ENCOUNTER
Based on this information would recommend reassurance and to continue with his conservative care - Tylenol, push fluids, rest.

## 2021-06-07 ENCOUNTER — OFFICE VISIT (OUTPATIENT)
Dept: CARDIOLOGY CLINIC | Age: 71
End: 2021-06-07
Payer: MEDICARE

## 2021-06-07 VITALS
SYSTOLIC BLOOD PRESSURE: 132 MMHG | DIASTOLIC BLOOD PRESSURE: 62 MMHG | HEART RATE: 65 BPM | BODY MASS INDEX: 31.16 KG/M2 | WEIGHT: 210.4 LBS | HEIGHT: 69 IN

## 2021-06-07 DIAGNOSIS — I25.10 CORONARY ARTERY DISEASE INVOLVING NATIVE CORONARY ARTERY OF NATIVE HEART WITHOUT ANGINA PECTORIS: Primary | ICD-10-CM

## 2021-06-07 DIAGNOSIS — E78.01 FAMILIAL HYPERCHOLESTEROLEMIA: ICD-10-CM

## 2021-06-07 DIAGNOSIS — I10 ESSENTIAL HYPERTENSION: ICD-10-CM

## 2021-06-07 PROCEDURE — 4040F PNEUMOC VAC/ADMIN/RCVD: CPT | Performed by: NUCLEAR MEDICINE

## 2021-06-07 PROCEDURE — 1036F TOBACCO NON-USER: CPT | Performed by: NUCLEAR MEDICINE

## 2021-06-07 PROCEDURE — G8427 DOCREV CUR MEDS BY ELIG CLIN: HCPCS | Performed by: NUCLEAR MEDICINE

## 2021-06-07 PROCEDURE — 99213 OFFICE O/P EST LOW 20 MIN: CPT | Performed by: NUCLEAR MEDICINE

## 2021-06-07 PROCEDURE — G8417 CALC BMI ABV UP PARAM F/U: HCPCS | Performed by: NUCLEAR MEDICINE

## 2021-06-07 PROCEDURE — 3017F COLORECTAL CA SCREEN DOC REV: CPT | Performed by: NUCLEAR MEDICINE

## 2021-06-07 PROCEDURE — 1123F ACP DISCUSS/DSCN MKR DOCD: CPT | Performed by: NUCLEAR MEDICINE

## 2021-06-07 NOTE — PROGRESS NOTES
[facility] [address] [place] [address] [address]  [address]  [facility] [address]  Dept: [phone]  Dept Fax: [phone]  Loc: [phone]    Visit Date: 6/7/2021    Marsha Mckeon is a 80 y.o. male who presents todayfor:  Chief Complaint   Patient presents with    Check-Up    Coronary Artery Disease    Hypertension    Hyperlipidemia   admitted for angina  Had a stent to the RCA   Did well  Started Praluant   Did well  No chest pain  No changes in breathing  Bp is stable  No dizziness  No syncope        HPI:  HPI  Past Medical History:   Diagnosis Date    Acquired hypothyroidism 2/21/2019    CAD (coronary artery disease)     Cancer ([facility] [address]     SKIN    DDD (degenerative disc disease), cervical 4/10/2017    GERD (gastroesophageal reflux disease)     Hyperlipidemia     Hypertension       Past Surgical History:   Procedure Laterality Date    CARDIAC CATHETERIZATION  08/20/2004    EF 60%    CARDIAC CATHETERIZATION  03/01/2003    EF 60%    CARDIOVASCULAR STRESS TEST  02/25/2010    EF 63%    CARDIOVASCULAR STRESS TEST  11/17/2006    EF 68%    CARDIOVASCULAR STRESS TEST  08/18/2005    EF 48%    CARDIOVASCULAR STRESS TEST  07/27/2004    EF 63%    CARDIOVASCULAR STRESS TEST  03/18/2003    EF 43%    DIAGNOSTIC CARDIAC CATH LAB PROCEDURE  08/18/2000    EF 70%    PTCA  07/19/2006    CYPHER STENT TO MID RCA    TRANSTHORACIC ECHOCARDIOGRAM  02/25/2010    EF 55-65%     Family History   Problem Relation Age of Onset    High Blood Pressure Mother     High Blood Pressure Father     Diabetes Sister     Heart Disease Maternal Grandmother     High Blood Pressure Maternal Grandmother     High Blood Pressure Maternal Grandfather     High Blood Pressure Paternal Grandmother     High Blood Pressure Paternal Grandfather     Diabetes Brother      Social History     Tobacco Use    Smoking status: Former Smoker     Packs/day: 2.00     Years: 20.00     Pack years: 40.00 Types: Cigarettes     Quit date: 1992     Years since quittin.0    Smokeless tobacco: Never Used   Substance Use Topics    Alcohol use: Yes     Comment: glass red wine daily      Current Outpatient Medications   Medication Sig Dispense Refill    aspirin 81 MG chewable tablet Take 1 tablet by mouth daily Take for 30 days then stop 30 tablet 0    nitroGLYCERIN (NITROSTAT) 0.4 MG SL tablet DISSOLVE ONE TABLET UNDER THE TONGUE EVERY 5 MINUTES AS NEEDED FOR CHEST PAIN. DO NOT EXCEED A TOTAL OF 3 DOSES IN 15 MINUTES 25 tablet 3    SYNTHROID 100 MCG tablet Take 1 tablet by mouth daily Take with water on an empty stomach- wait 30 minutes before eating or taking other meds. 30 tablet 5    atenolol (TENORMIN) 25 MG tablet Take 1 tablet by mouth daily 90 tablet 1    Evolocumab 140 MG/ML SOAJ Inject 140 mg into the skin every 14 days 6 pen 3    clopidogrel (PLAVIX) 75 MG tablet Take 1 tablet by mouth once daily 90 tablet 3    apixaban (ELIQUIS) 5 MG TABS tablet TAKE 1 TABLET BY MOUTH EVERY 12 HOURS 180 tablet 3    valsartan-hydrochlorothiazide (DIOVAN-HCT) 160-12.5 MG per tablet Take 1 tablet by mouth 2 times daily 180 tablet 3    diphenhydrAMINE-APAP, sleep, (TYLENOL PM EXTRA STRENGTH)  MG tablet Take 2 tablets by mouth nightly as needed for Sleep      Multiple Vitamins-Minerals (THERAPEUTIC MULTIVITAMIN-MINERALS) tablet Take 1 tablet by mouth daily      Ascorbic Acid (VITAMIN C PO) Take 1,000 mg by mouth daily       Coenzyme Q10 100 MG CAPS Take 100 mg by mouth daily. No current facility-administered medications for this visit.      Allergies   Allergen Reactions    Crestor [Rosuvastatin]      Muscle aches    Tape Tray Felicity Tape] Rash     Blisters and red     Health Maintenance   Topic Date Due    Hepatitis C screen  Never done    Colon cancer screen colonoscopy  Never done    Shingles Vaccine (2 of 2) 2018    TSH testing  2021    Potassium monitoring  2022    Creatinine monitoring  02/08/2022    A1C test (Diabetic or Prediabetic)  03/24/2022    DTaP/Tdap/Td vaccine (2 - Td or Tdap) 12/01/2023    Lipid screen  03/24/2026    Flu vaccine  Completed    Pneumococcal 65+ years Vaccine  Completed    COVID-19 Vaccine  Completed    AAA screen  Completed    Hepatitis A vaccine  Aged Out    Hepatitis B vaccine  Aged Out    Hib vaccine  Aged Out    Meningococcal (ACWY) vaccine  Aged Out       Subjective:  Review of Systems  General:   No fever, no chills, No fatigue or weight loss  Pulmonary:    No dyspnea, no wheezing  Cardiac:    Denies recent chest pain,   GI:     No nausea or vomiting, no abdominal pain  Neuro:    No dizziness or light headedness,   Musculoskeletal:  No recent active issues  Extremities:   No edema, no obvious claudication       Objective:  Physical Exam  /62   Pulse 65   Ht 5' 9\" (1.753 m)   Wt 210 lb 6.4 oz (95.4 kg)   BMI 31.07 kg/m²   General:   Well developed, well nourished  Lungs:   Clear to auscultation  Heart:    Normal S1 S2, Slight murmur. no rubs, no gallops  Abdomen:   Soft, non tender, no organomegalies, positive bowel sounds  Extremities:   No edema, no cyanosis, good peripheral pulses  Neurological:   Awake, alert, oriented. No obvious focal deficits  Musculoskelatal:  No obvious deformities    Assessment:      Diagnosis Orders   1. Coronary artery disease involving native coronary artery of native heart without angina pectoris     2. Essential hypertension     3. Familial hypercholesterolemia     as above  Cardiac seems stable      Plan:  No follow-ups on file. As above  Continue risk factor modification and medical management  Thank you for allowing me to participate in the care of your patient. Please don't hesitate to contact me regarding any further issues related to the patient care    Orders Placed:  No orders of the defined types were placed in this encounter.       Medications Prescribed:  No orders of the defined types were placed in this encounter. Discussed use, benefit, and side effects of prescribed medications. All patient questions answered. Pt voicedunderstanding. Instructed to continue current medications, diet and exercise. Continue risk factor modification and medical management. Patient agreed with treatment plan. Follow up as directed.     Electronically signedby Malik Ball MD on 6/7/2021 at 9:49 AM

## 2021-06-14 RX ORDER — FENOFIBRATE 160 MG/1
160 TABLET ORAL DAILY
Qty: 30 TABLET | Refills: 11 | Status: SHIPPED | OUTPATIENT
Start: 2021-06-14 | End: 2021-12-06

## 2021-06-14 NOTE — TELEPHONE ENCOUNTER
Patient calling and questioning if he is to continue with Fenofibrate or not. Pharmacy is [facility].   Please advise

## 2021-07-12 RX ORDER — ATENOLOL 25 MG/1
TABLET ORAL
Qty: 90 TABLET | Refills: 1 | Status: SHIPPED | OUTPATIENT
Start: 2021-07-12 | End: 2021-08-27 | Stop reason: DRUGHIGH

## 2021-08-25 ENCOUNTER — OFFICE VISIT (OUTPATIENT)
Dept: FAMILY MEDICINE CLINIC | Age: 71
End: 2021-08-25
Payer: MEDICARE

## 2021-08-25 VITALS
HEART RATE: 76 BPM | OXYGEN SATURATION: 98 % | BODY MASS INDEX: 31.28 KG/M2 | WEIGHT: 211.8 LBS | RESPIRATION RATE: 12 BRPM | DIASTOLIC BLOOD PRESSURE: 72 MMHG | SYSTOLIC BLOOD PRESSURE: 126 MMHG

## 2021-08-25 DIAGNOSIS — H91.92 HEARING LOSS OF LEFT EAR, UNSPECIFIED HEARING LOSS TYPE: Primary | ICD-10-CM

## 2021-08-25 PROCEDURE — 1123F ACP DISCUSS/DSCN MKR DOCD: CPT | Performed by: NURSE PRACTITIONER

## 2021-08-25 PROCEDURE — 99213 OFFICE O/P EST LOW 20 MIN: CPT | Performed by: NURSE PRACTITIONER

## 2021-08-25 PROCEDURE — 3017F COLORECTAL CA SCREEN DOC REV: CPT | Performed by: NURSE PRACTITIONER

## 2021-08-25 PROCEDURE — 1036F TOBACCO NON-USER: CPT | Performed by: NURSE PRACTITIONER

## 2021-08-25 PROCEDURE — G8427 DOCREV CUR MEDS BY ELIG CLIN: HCPCS | Performed by: NURSE PRACTITIONER

## 2021-08-25 PROCEDURE — G8417 CALC BMI ABV UP PARAM F/U: HCPCS | Performed by: NURSE PRACTITIONER

## 2021-08-25 PROCEDURE — 4040F PNEUMOC VAC/ADMIN/RCVD: CPT | Performed by: NURSE PRACTITIONER

## 2021-08-25 SDOH — ECONOMIC STABILITY: FOOD INSECURITY: WITHIN THE PAST 12 MONTHS, THE FOOD YOU BOUGHT JUST DIDN'T LAST AND YOU DIDN'T HAVE MONEY TO GET MORE.: NEVER TRUE

## 2021-08-25 SDOH — ECONOMIC STABILITY: FOOD INSECURITY: WITHIN THE PAST 12 MONTHS, YOU WORRIED THAT YOUR FOOD WOULD RUN OUT BEFORE YOU GOT MONEY TO BUY MORE.: NEVER TRUE

## 2021-08-25 ASSESSMENT — PATIENT HEALTH QUESTIONNAIRE - PHQ9
SUM OF ALL RESPONSES TO PHQ QUESTIONS 1-9: 0
SUM OF ALL RESPONSES TO PHQ QUESTIONS 1-9: 0
1. LITTLE INTEREST OR PLEASURE IN DOING THINGS: 0
2. FEELING DOWN, DEPRESSED OR HOPELESS: 0
SUM OF ALL RESPONSES TO PHQ QUESTIONS 1-9: 0
SUM OF ALL RESPONSES TO PHQ9 QUESTIONS 1 & 2: 0

## 2021-08-25 ASSESSMENT — ENCOUNTER SYMPTOMS: RESPIRATORY NEGATIVE: 1

## 2021-08-25 ASSESSMENT — SOCIAL DETERMINANTS OF HEALTH (SDOH): HOW HARD IS IT FOR YOU TO PAY FOR THE VERY BASICS LIKE FOOD, HOUSING, MEDICAL CARE, AND HEATING?: NOT HARD AT ALL

## 2021-08-25 NOTE — PROGRESS NOTES
Tripp Pisano (1950) 80 y.o. male here for evaluation of the following chief complaint(s):      HPI:  Chief Complaint   Patient presents with    Ear Fullness     left side- denies pain- \"wind tunnel\" sound in left ear        Onset of 1 day with left side hearing loss. Muffled hearing. Air rushing sound hearing in left ear. Denies ringing. Denies sinus congestion. No dizziness. No ear pain. Vitals:    08/25/21 1120   BP: 126/72   Pulse: 76   Resp: 12   SpO2: 98%       Patient Active Problem List   Diagnosis    Coronary artery disease involving native coronary artery of native heart with unstable angina pectoris ([facility] [address]    Hypertension    Hyperlipidemia    Sleep apnea    Hemorrhoids    GERD (gastroesophageal reflux disease)    Pharyngitis, acute    Back pain    Urinary frequency    Hematuria, undiagnosed cause    OAB (overactive bladder)    BPH associated with nocturia    Tracheobronchitis    Cervical muscle pain    Acute cervical myofascial strain, secondary to fall    DDD (degenerative disc disease), cervical    Skin tag, buttocks.  Chest pain    Angina effort    Dyslipidemia    S/P angioplasty with stent    Erectile dysfunction due to arterial insufficiency    Primary insomnia    Medication monitoring encounter    Bilateral tinnitus    Acquired hypothyroidism    Atrial fibrillation with RVR (HCC)    Atrial flutter (HCC)    PAD (peripheral artery disease) (HCC)    Unstable angina (HCC)       SUBJECTIVE/OBJECTIVE:  Review of Systems   Constitutional: Negative. HENT: Positive for hearing loss. Negative for congestion, ear discharge and ear pain. Respiratory: Negative. Cardiovascular: Negative. Allergic/Immunologic: Positive for environmental allergies. Physical Exam  Constitutional:       General: He is not in acute distress. Appearance: He is not ill-appearing. HENT:      Left Ear: Decreased hearing noted. No swelling.   No middle ear effusion. There is no impacted cerumen. Tympanic membrane is retracted. Nose: Nose normal. No congestion or rhinorrhea. Neurological:      Mental Status: He is alert. ASSESSMENT/PLAN:   Diagnosis Orders   1. Hearing loss of left ear, unspecified hearing loss type           MDM: Likely ETD related - exam benign   Recommend OTC Nasal Sprays   Reassurance      An electronic signature was used to authenticate this note.     --Timur Cody, BASHIR - CNP

## 2021-08-26 NOTE — TELEPHONE ENCOUNTER
Pt is asking about the dose of the tenormin, and valsartan. Pt has been taking Tenormin 2 -25 mg bid, and one valsartan daily. But scripts say other wise. Recommendations?

## 2021-08-27 RX ORDER — VALSARTAN AND HYDROCHLOROTHIAZIDE 160; 12.5 MG/1; MG/1
1 TABLET, FILM COATED ORAL DAILY
Qty: 90 TABLET | Refills: 0 | Status: SHIPPED | OUTPATIENT
Start: 2021-08-27 | End: 2021-12-06 | Stop reason: SDUPTHER

## 2021-08-27 RX ORDER — ATENOLOL 25 MG/1
25 TABLET ORAL 2 TIMES DAILY
Qty: 180 TABLET | Refills: 0 | Status: SHIPPED | OUTPATIENT
Start: 2021-08-27 | End: 2021-12-13

## 2021-08-27 RX ORDER — ATENOLOL 25 MG/1
25 TABLET ORAL 2 TIMES DAILY
COMMUNITY
End: 2021-08-27 | Stop reason: SDUPTHER

## 2021-08-27 RX ORDER — VALSARTAN AND HYDROCHLOROTHIAZIDE 160; 12.5 MG/1; MG/1
1 TABLET, FILM COATED ORAL DAILY
COMMUNITY
End: 2021-08-27 | Stop reason: SDUPTHER

## 2021-08-27 NOTE — TELEPHONE ENCOUNTER
Spoke with pt he states he is feeling great, and vitals are very good with the dose he has been on, its it ok to stay with what he is on?    Tenormin 2-25 mg BID  Valsartan-hyd 160/12.5 1 daily

## 2021-11-10 RX ORDER — CLOPIDOGREL BISULFATE 75 MG/1
TABLET ORAL
Qty: 90 TABLET | Refills: 0 | Status: SHIPPED | OUTPATIENT
Start: 2021-11-10 | End: 2021-12-06 | Stop reason: SDUPTHER

## 2021-12-06 ENCOUNTER — OFFICE VISIT (OUTPATIENT)
Dept: CARDIOLOGY CLINIC | Age: 71
End: 2021-12-06
Payer: MEDICARE

## 2021-12-06 VITALS
WEIGHT: 219 LBS | SYSTOLIC BLOOD PRESSURE: 134 MMHG | DIASTOLIC BLOOD PRESSURE: 70 MMHG | BODY MASS INDEX: 32.44 KG/M2 | HEIGHT: 69 IN | HEART RATE: 62 BPM

## 2021-12-06 DIAGNOSIS — E78.01 FAMILIAL HYPERCHOLESTEROLEMIA: ICD-10-CM

## 2021-12-06 DIAGNOSIS — R00.2 PALPITATION: ICD-10-CM

## 2021-12-06 DIAGNOSIS — I10 PRIMARY HYPERTENSION: ICD-10-CM

## 2021-12-06 DIAGNOSIS — I73.9 CLAUDICATION (HCC): ICD-10-CM

## 2021-12-06 DIAGNOSIS — I25.10 CORONARY ARTERY DISEASE INVOLVING NATIVE CORONARY ARTERY OF NATIVE HEART WITHOUT ANGINA PECTORIS: Primary | ICD-10-CM

## 2021-12-06 PROCEDURE — 3017F COLORECTAL CA SCREEN DOC REV: CPT | Performed by: NUCLEAR MEDICINE

## 2021-12-06 PROCEDURE — 1123F ACP DISCUSS/DSCN MKR DOCD: CPT | Performed by: NUCLEAR MEDICINE

## 2021-12-06 PROCEDURE — G8484 FLU IMMUNIZE NO ADMIN: HCPCS | Performed by: NUCLEAR MEDICINE

## 2021-12-06 PROCEDURE — 4040F PNEUMOC VAC/ADMIN/RCVD: CPT | Performed by: NUCLEAR MEDICINE

## 2021-12-06 PROCEDURE — 1036F TOBACCO NON-USER: CPT | Performed by: NUCLEAR MEDICINE

## 2021-12-06 PROCEDURE — G8417 CALC BMI ABV UP PARAM F/U: HCPCS | Performed by: NUCLEAR MEDICINE

## 2021-12-06 PROCEDURE — 99214 OFFICE O/P EST MOD 30 MIN: CPT | Performed by: NUCLEAR MEDICINE

## 2021-12-06 PROCEDURE — G8427 DOCREV CUR MEDS BY ELIG CLIN: HCPCS | Performed by: NUCLEAR MEDICINE

## 2021-12-06 RX ORDER — CLOPIDOGREL BISULFATE 75 MG/1
TABLET ORAL
Qty: 90 TABLET | Refills: 0 | Status: SHIPPED | OUTPATIENT
Start: 2021-12-06 | End: 2022-05-16

## 2021-12-06 RX ORDER — VALSARTAN AND HYDROCHLOROTHIAZIDE 160; 12.5 MG/1; MG/1
1 TABLET, FILM COATED ORAL DAILY
Qty: 90 TABLET | Refills: 0 | Status: SHIPPED | OUTPATIENT
Start: 2021-12-06 | End: 2022-02-08

## 2021-12-06 RX ORDER — ROSUVASTATIN CALCIUM 20 MG/1
20 TABLET, COATED ORAL DAILY
COMMUNITY
End: 2022-02-10

## 2021-12-06 NOTE — PROGRESS NOTES
[id number] [facility] .  [address] [place] [id number]  Dept: [phone]  Dept Fax: [phone]  Loc: [phone]    Visit Date: 12/6/2021    Catalina Persaud is a 80 y.o. male who presents todayfor:  Chief Complaint   Patient presents with    Follow-up    Coronary Artery Disease    Hypertension    Hyperlipidemia   has had palpitation   Intermittent in nature  Not lasting long   No associated dizziness  No syncope  Known CAD and stents  No chest pain   No use nitro   He is active   No changes clinically   Bp is stable   No dizziness  No syncope  On praluant for hyperlipidemia  Issues with statins   3      HPI:  HPI  Past Medical History:   Diagnosis Date    Acquired hypothyroidism 2/21/2019    CAD (coronary artery disease)     Cancer ([address]     SKIN    DDD (degenerative disc disease), cervical 4/10/2017    GERD (gastroesophageal reflux disease)     Hyperlipidemia     Hypertension       Past Surgical History:   Procedure Laterality Date    CARDIAC CATHETERIZATION  08/20/2004    EF 60%    CARDIAC CATHETERIZATION  03/01/2003    EF 60%    CARDIOVASCULAR STRESS TEST  02/25/2010    EF 63%    CARDIOVASCULAR STRESS TEST  11/17/2006    EF 68%    CARDIOVASCULAR STRESS TEST  08/18/2005    EF 48%    CARDIOVASCULAR STRESS TEST  07/27/2004    EF 63%    CARDIOVASCULAR STRESS TEST  03/18/2003    EF 43%    DIAGNOSTIC CARDIAC CATH LAB PROCEDURE  08/18/2000    EF 70%    PTCA  07/19/2006    CYPHER STENT TO MID RCA    TRANSTHORACIC ECHOCARDIOGRAM  02/25/2010    EF 55-65%     Family History   Problem Relation Age of Onset    High Blood Pressure Mother     High Blood Pressure Father     Diabetes Sister     Heart Disease Maternal Grandmother     High Blood Pressure Maternal Grandmother     High Blood Pressure Maternal Grandfather     High Blood Pressure Paternal Grandmother     High Blood Pressure Paternal Grandfather     Diabetes Brother Social History     Tobacco Use    Smoking status: Former Smoker     Packs/day: 2.00     Years: 20.00     Pack years: 40.00     Types: Cigarettes     Quit date: 1992     Years since quittin.5    Smokeless tobacco: Never Used   Substance Use Topics    Alcohol use: Yes     Comment: glass red wine daily      Current Outpatient Medications   Medication Sig Dispense Refill    rosuvastatin (CRESTOR) 20 MG tablet Take 20 mg by mouth daily      Evolocumab 140 MG/ML SOAJ Inject 140 mg into the skin every 14 days 6 pen 3    clopidogrel (PLAVIX) 75 MG tablet Take 1 tablet by mouth once daily 90 tablet 0    SYNTHROID 100 MCG tablet TAKE 1 TABLET BY MOUTH ONCE DAILY WITH  WATER  ON  AN  EMPTY  STOMACH. WAIT  30  MINUTES  BEFORE  EATING  OR  TAKING  OTHER  MEDS. 30 tablet 11    valsartan-hydroCHLOROthiazide (DIOVAN-HCT) 160-12.5 MG per tablet Take 1 tablet by mouth daily (Patient taking differently: Take 1 tablet by mouth 2 times daily ) 90 tablet 0    atenolol (TENORMIN) 25 MG tablet Take 1 tablet by mouth 2 times daily 180 tablet 0    aspirin 81 MG chewable tablet Take 1 tablet by mouth daily Take for 30 days then stop 30 tablet 0    nitroGLYCERIN (NITROSTAT) 0.4 MG SL tablet DISSOLVE ONE TABLET UNDER THE TONGUE EVERY 5 MINUTES AS NEEDED FOR CHEST PAIN. DO NOT EXCEED A TOTAL OF 3 DOSES IN 15 MINUTES 25 tablet 3    apixaban (ELIQUIS) 5 MG TABS tablet TAKE 1 TABLET BY MOUTH EVERY 12 HOURS 180 tablet 3    diphenhydrAMINE-APAP, sleep, (TYLENOL PM EXTRA STRENGTH)  MG tablet Take 2 tablets by mouth nightly as needed for Sleep      Multiple Vitamins-Minerals (THERAPEUTIC MULTIVITAMIN-MINERALS) tablet Take 1 tablet by mouth daily      Ascorbic Acid (VITAMIN C PO) Take 1,000 mg by mouth daily       Coenzyme Q10 100 MG CAPS Take 100 mg by mouth daily. No current facility-administered medications for this visit.      Allergies   Allergen Reactions    Crestor [Rosuvastatin]      Muscle aches    Tape [place] Tape] Rash     Blisters and red     Health Maintenance   Topic Date Due    Hepatitis C screen  Never done    Colon cancer screen colonoscopy  Never done    Shingles Vaccine (2 of 2) 06/19/2018    TSH testing  09/09/2021    COVID-19 Vaccine (3 - Booster for Moderna series) 09/10/2021    Annual Wellness Visit (AWV)  09/24/2021    Potassium monitoring  02/08/2022    Creatinine monitoring  02/08/2022    A1C test (Diabetic or Prediabetic)  03/24/2022    DTaP/Tdap/Td vaccine (2 - Td or Tdap) 12/01/2023    Lipid screen  03/24/2026    Flu vaccine  Completed    Pneumococcal 65+ years Vaccine  Completed    AAA screen  Completed    Hepatitis A vaccine  Aged Out    Hepatitis B vaccine  Aged Out    Hib vaccine  Aged Out    Meningococcal (ACWY) vaccine  Aged Out       Subjective:  Review of Systems  General:   No fever, no chills, some fatigue or weight loss  Pulmonary:    some dyspnea, no wheezing  Cardiac:    Denies recent chest pain,   GI:     No nausea or vomiting, no abdominal pain  Neuro:    No dizziness or light headedness,   Musculoskeletal:  No recent active issues  Extremities:   No edema, ?/ claudication       Objective:  Physical Exam  /70   Pulse 62   Ht 5' 9\" (1.753 m)   Wt 219 lb (99.3 kg)   BMI 32.34 kg/m²   General:   Well developed, well nourished  Lungs:   Clear to auscultation  Heart:    Normal S1 S2, Slight murmur. no rubs, no gallops  Abdomen:   Soft, non tender, no organomegalies, positive bowel sounds  Extremities:   No edema, no cyanosis, good peripheral pulses  Neurological:   Awake, alert, oriented. No obvious focal deficits  Musculoskelatal:  No obvious deformities    Assessment:      Diagnosis Orders   1. Coronary artery disease involving native coronary artery of native heart without angina pectoris     2. Primary hypertension     3. Familial hypercholesterolemia     4. Palpitation     as above  ? ?  Arrhythmia   High caffeine intake  Cardiac other wise seems stable   ? ? PVD and leg issues     Plan:  No follow-ups on file. Discussed  Cut back on caffeine   Consider a monitor if more palpitation   Monitor symptoms  Check TAMELA for PVD   Seems stable other wise  Continue risk factor modification and medical management  Thank you for allowing me to participate in the care of your patient. Please don't hesitate to contact me regarding any further issues related to the patient care    Orders Placed:  No orders of the defined types were placed in this encounter. Medications Prescribed:  No orders of the defined types were placed in this encounter. Discussed use, benefit, and side effects of prescribed medications. All patient questions answered. Pt voicedunderstanding. Instructed to continue current medications, diet and exercise. Continue risk factor modification and medical management. Patient agreed with treatment plan. Follow up as directed.     Electronically signedby Colin Sethi MD on 12/6/2021 at 9:26 AM

## 2021-12-10 ENCOUNTER — HOSPITAL ENCOUNTER (OUTPATIENT)
Dept: INTERVENTIONAL RADIOLOGY/VASCULAR | Age: 71
Discharge: HOME | End: 2021-12-10
Payer: MEDICARE

## 2021-12-10 DIAGNOSIS — E78.01 FAMILIAL HYPERCHOLESTEROLEMIA: ICD-10-CM

## 2021-12-10 DIAGNOSIS — R00.2 PALPITATION: ICD-10-CM

## 2021-12-10 DIAGNOSIS — I25.10 CORONARY ARTERY DISEASE INVOLVING NATIVE CORONARY ARTERY OF NATIVE HEART WITHOUT ANGINA PECTORIS: ICD-10-CM

## 2021-12-10 DIAGNOSIS — I10 PRIMARY HYPERTENSION: ICD-10-CM

## 2021-12-10 DIAGNOSIS — I73.9 CLAUDICATION (HCC): ICD-10-CM

## 2021-12-10 PROCEDURE — 93922 UPR/L XTREMITY ART 2 LEVELS: CPT

## 2021-12-13 RX ORDER — ATENOLOL 25 MG/1
TABLET ORAL
Qty: 180 TABLET | Refills: 3 | Status: SHIPPED | OUTPATIENT
Start: 2021-12-13

## 2022-02-08 RX ORDER — VALSARTAN AND HYDROCHLOROTHIAZIDE 160; 12.5 MG/1; MG/1
TABLET, FILM COATED ORAL
Qty: 90 TABLET | Refills: 3 | Status: SHIPPED | OUTPATIENT
Start: 2022-02-08 | End: 2022-03-28 | Stop reason: SDUPTHER

## 2022-02-10 ENCOUNTER — OFFICE VISIT (OUTPATIENT)
Dept: FAMILY MEDICINE CLINIC | Age: 72
End: 2022-02-10
Payer: MEDICARE

## 2022-02-10 VITALS
WEIGHT: 218.5 LBS | RESPIRATION RATE: 12 BRPM | HEART RATE: 60 BPM | BODY MASS INDEX: 32.27 KG/M2 | SYSTOLIC BLOOD PRESSURE: 118 MMHG | DIASTOLIC BLOOD PRESSURE: 70 MMHG

## 2022-02-10 DIAGNOSIS — I20.8 EXERCISE-INDUCED ANGINA (HCC): ICD-10-CM

## 2022-02-10 DIAGNOSIS — E78.5 DYSLIPIDEMIA: ICD-10-CM

## 2022-02-10 DIAGNOSIS — M25.50 ARTHRALGIA OF MULTIPLE JOINTS: Primary | ICD-10-CM

## 2022-02-10 DIAGNOSIS — I73.9 PAD (PERIPHERAL ARTERY DISEASE) (HCC): ICD-10-CM

## 2022-02-10 DIAGNOSIS — I48.0 PAF (PAROXYSMAL ATRIAL FIBRILLATION) (HCC): ICD-10-CM

## 2022-02-10 DIAGNOSIS — I25.110 CORONARY ARTERY DISEASE INVOLVING NATIVE CORONARY ARTERY OF NATIVE HEART WITH UNSTABLE ANGINA PECTORIS (HCC): ICD-10-CM

## 2022-02-10 PROCEDURE — G8427 DOCREV CUR MEDS BY ELIG CLIN: HCPCS | Performed by: NURSE PRACTITIONER

## 2022-02-10 PROCEDURE — 4040F PNEUMOC VAC/ADMIN/RCVD: CPT | Performed by: NURSE PRACTITIONER

## 2022-02-10 PROCEDURE — 1123F ACP DISCUSS/DSCN MKR DOCD: CPT | Performed by: NURSE PRACTITIONER

## 2022-02-10 PROCEDURE — 3017F COLORECTAL CA SCREEN DOC REV: CPT | Performed by: NURSE PRACTITIONER

## 2022-02-10 PROCEDURE — G8484 FLU IMMUNIZE NO ADMIN: HCPCS | Performed by: NURSE PRACTITIONER

## 2022-02-10 PROCEDURE — 99214 OFFICE O/P EST MOD 30 MIN: CPT | Performed by: NURSE PRACTITIONER

## 2022-02-10 PROCEDURE — G8417 CALC BMI ABV UP PARAM F/U: HCPCS | Performed by: NURSE PRACTITIONER

## 2022-02-10 PROCEDURE — 1036F TOBACCO NON-USER: CPT | Performed by: NURSE PRACTITIONER

## 2022-02-10 ASSESSMENT — ENCOUNTER SYMPTOMS
SHORTNESS OF BREATH: 0
BACK PAIN: 0
COUGH: 0
ABDOMINAL PAIN: 0
NAUSEA: 0

## 2022-02-10 NOTE — PROGRESS NOTES
Subjective:      Patient ID: Alicja Alexander 1950 is a 80 y.o. male here for evaluation. Chief Complaint   Patient presents with    Hip Pain     x 6 weeks, bilateral    Leg Pain     bilateral       Started out with feet, traveled to ankles and knee and then hips. Bilateral joint pain. Denies stiffness or joint swelling. Soreness in joints. No limitations with activity. Denies shooting pain. Denies muscle pains. Denies back pain. TAMELA December 2021 were NEG. Hx of PAD. Patient Active Problem List   Diagnosis    Coronary artery disease involving native coronary artery of native heart with unstable angina pectoris ([facility] [address]    Hypertension    Hyperlipidemia    Sleep apnea    Hemorrhoids    GERD (gastroesophageal reflux disease)    Pharyngitis, acute    Back pain    Urinary frequency    Hematuria, undiagnosed cause    OAB (overactive bladder)    BPH associated with nocturia    Tracheobronchitis    Cervical muscle pain    Acute cervical myofascial strain, secondary to fall    DDD (degenerative disc disease), cervical    Skin tag, buttocks.  Chest pain    Angina effort    Dyslipidemia    S/P angioplasty with stent    Erectile dysfunction due to arterial insufficiency    Primary insomnia    Medication monitoring encounter    Bilateral tinnitus    Acquired hypothyroidism    Atrial fibrillation with RVR (HCC)    Atrial flutter (HCC)    PAD (peripheral artery disease) ([facility] [address]    Unstable angina (HCC)       COLONOSCOPY - due    CARDIO - Dr. Susan Bond    Denies CP, SOB or chest tightness. CAD. On Plavix and Eliquis for AFIB. Atenolol 25 mg BID    Vitals:    02/10/22 0751   BP: 118/70   Pulse: 60   Resp: 12        BP wnl. ON Diovan .12.5    BP Readings from Last 3 Encounters:   02/10/22 118/70   12/06/21 134/70   08/25/21 126/72         On PRALUENT INJ for Cholesterol.      Synthroid 100 mcg Daily    Lab Results   Component Value Date    LABA1C 5.9 03/24/2021     No results found for: EAG    No components found for: CHLPL  Lab Results   Component Value Date    TRIG 344 (H) 03/24/2021    TRIG 609 (H) 02/08/2021    TRIG 555 (H) 02/07/2021     Lab Results   Component Value Date    HDL 37 03/24/2021    HDL 31 02/08/2021    HDL 32 02/07/2021     Lab Results   Component Value Date    LDLCALC 52 03/24/2021    LDLCALC SEE BELOW 02/08/2021    LDLCALC SEE BELOW 02/07/2021     No results found for: LABVLDL      Chemistry        Component Value Date/Time     02/08/2021 0329    K 3.8 02/08/2021 0329     02/08/2021 0329    CO2 27 02/08/2021 0329    BUN 19 02/08/2021 0329    CREATININE 1.1 02/08/2021 0329        Component Value Date/Time    CALCIUM 9.3 02/08/2021 0329    ALKPHOS 54 12/02/2020 1234    AST 26 12/02/2020 1234    ALT 30 12/02/2020 1234    BILITOT 1.2 12/02/2020 1234            Lab Results   Component Value Date    TSH 2.690 09/09/2020       Lab Results   Component Value Date    WBC 7.2 02/08/2021    HGB 15.5 02/08/2021    HCT 45.0 02/08/2021    MCV 90.5 02/08/2021     02/08/2021         Health Maintenance   Topic Date Due    Hepatitis C screen  Never done    Colon cancer screen colonoscopy  Never done    Shingles Vaccine (2 of 2) 06/19/2018    TSH testing  09/09/2021    Annual Wellness Visit (AWV)  09/24/2021    Potassium monitoring  02/08/2022    Creatinine monitoring  02/08/2022    A1C test (Diabetic or Prediabetic)  03/24/2022    Lipid screen  03/24/2022    Depression Screen  08/25/2022    DTaP/Tdap/Td vaccine (2 - Td or Tdap) 12/01/2023    Flu vaccine  Completed    Pneumococcal 65+ years Vaccine  Completed    COVID-19 Vaccine  Completed    AAA screen  Completed    Hepatitis A vaccine  Aged Out    Hepatitis B vaccine  Aged Out    Hib vaccine  Aged Out    Meningococcal (ACWY) vaccine  Aged [employer] History   Administered Date(s) Administered    COVID-19, Zaid [place], Primary or Immunocompromised, PF, 100mcg/0.5mL 02/10/2021, 03/10/2021, 10/30/2021    Hepatitis A Adult (Havrix, Vaqta) 02/17/2014    Influenza Virus Vaccine 10/10/2017    Influenza, High Dose (Fluzone 65 yrs and older) 10/07/2018    Influenza, Quadv, adjuvanted, 65 yrs +, IM, PF (Fluad) 10/11/2020, 10/10/2021    Influenza, Triv, inactivated, subunit, adjuvanted, IM (Fluad 65 yrs and older) 10/07/2018, 10/06/2019    Pneumococcal Conjugate 13-valent ([id number]) 10/02/2016, 10/10/2017, 10/06/2019    Pneumococcal Polysaccharide ([id number]) 10/14/2016, 10/13/2017    Tdap (Boostrix, Adacel) 12/01/2013    Typhoid Vi capsular polysaccharide (Typhim VI) 02/17/2014    Zoster Recombinant (Shingrix) 04/24/2018       Review of Systems   Constitutional: Negative for chills and fever. HENT: Negative. Respiratory: Negative for cough and shortness of breath. Cardiovascular: Negative for chest pain. Gastrointestinal: Negative for abdominal pain and nausea. Musculoskeletal: Positive for arthralgias. Negative for back pain, gait problem, joint swelling and myalgias. Skin: Negative for rash. Neurological: Negative for dizziness, light-headedness and headaches. Psychiatric/Behavioral: Negative. Objective:   Physical Exam  Constitutional:       General: He is not in acute distress. Eyes:      Pupils: Pupils are equal, round, and reactive to light. Cardiovascular:      Rate and Rhythm: Normal rate and regular rhythm. Heart sounds: No murmur heard. Pulmonary:      Effort: Pulmonary effort is normal.      Breath sounds: Normal breath sounds. No wheezing. Abdominal:      General: Bowel sounds are normal. There is no distension. Palpations: Abdomen is soft. Tenderness: There is no abdominal tenderness. Musculoskeletal:         General: No tenderness. Normal range of motion. Cervical back: Normal range of motion and neck supple. Skin:     General: Skin is warm and dry. Findings: No rash.           Assessment:       Diagnosis Orders   1. Arthralgia of multiple joints     2. PAF (paroxysmal atrial fibrillation) ([facility] [address]     3. Exercise-induced angina (HCC)     4. PAD (peripheral artery disease) ([facility] [address]     5. Coronary artery disease involving native coronary artery of native heart with [facility]     6. Dyslipidemia             Plan:      No stiffness or swelling to suggest inflammatory  No neurologic complaints to suggest lumbar stenosis  TAMELA NEG  SE related to Pralunent INJ? Hold next 2 doses   If no change will workup autoimmune arthritis  RTO if symptoms worsen or stay the same      Current Outpatient Medications   Medication Sig Dispense Refill    valsartan-hydroCHLOROthiazide (DIOVAN-HCT) 160-12.5 MG per tablet Take 1 tablet by mouth once daily 90 tablet 3    apixaban (ELIQUIS) 5 MG TABS tablet TAKE 1 TABLET BY MOUTH EVERY 12 HOURS 180 tablet 3    atenolol (TENORMIN) 25 MG tablet Take 1 tablet by mouth twice daily 180 tablet 3    clopidogrel (PLAVIX) 75 MG tablet Take 1 tablet by mouth once daily 90 tablet 0    Evolocumab 140 MG/ML SOAJ Inject 140 mg into the skin every 14 days 6 pen 3    SYNTHROID 100 MCG tablet TAKE 1 TABLET BY MOUTH ONCE DAILY WITH  WATER  ON  AN  EMPTY  STOMACH. WAIT  30  MINUTES  BEFORE  EATING  OR  TAKING  OTHER  MEDS. 30 tablet 11    nitroGLYCERIN (NITROSTAT) 0.4 MG SL tablet DISSOLVE ONE TABLET UNDER THE TONGUE EVERY 5 MINUTES AS NEEDED FOR CHEST PAIN. DO NOT EXCEED A TOTAL OF 3 DOSES IN 15 MINUTES 25 tablet 3    Multiple Vitamins-Minerals (THERAPEUTIC MULTIVITAMIN-MINERALS) tablet Take 1 tablet by mouth daily      Ascorbic Acid (VITAMIN C PO) Take 1,000 mg by mouth daily       Coenzyme Q10 100 MG CAPS Take 100 mg by mouth daily. No current facility-administered medications for this visit.

## 2022-03-09 RX ORDER — NITROGLYCERIN 0.4 MG/1
TABLET SUBLINGUAL
Qty: 25 TABLET | Refills: 3 | Status: SHIPPED | OUTPATIENT
Start: 2022-03-09

## 2022-03-28 RX ORDER — VALSARTAN AND HYDROCHLOROTHIAZIDE 160; 12.5 MG/1; MG/1
TABLET, FILM COATED ORAL
Qty: 90 TABLET | Refills: 2 | Status: SHIPPED | OUTPATIENT
Start: 2022-03-28 | End: 2022-03-31 | Stop reason: SDUPTHER

## 2022-03-28 NOTE — TELEPHONE ENCOUNTER
Anna Khanna called requesting a refill on the following medications:  Requested Prescriptions     Pending Prescriptions Disp Refills    valsartan-hydroCHLOROthiazide (DIOVAN-HCT) 160-12.5 MG per tablet 90 tablet 3     Pharmacy verified: [facility] on [address] [facility]  . pv      Date of last visit: 12/06/2021  Date of next visit (if applicable): Visit date not found    Pt requests refill be for 180 ct.

## 2022-03-30 NOTE — TELEPHONE ENCOUNTER
Pt needs a refill on diovan but pt states he has been taking 2 daily     Please advise if ok to fill for two daily. ?    [facility] [address]

## 2022-03-31 RX ORDER — VALSARTAN AND HYDROCHLOROTHIAZIDE 160; 12.5 MG/1; MG/1
TABLET, FILM COATED ORAL
Qty: 180 TABLET | Refills: 2 | Status: SHIPPED | OUTPATIENT
Start: 2022-03-31 | End: 2022-10-25

## 2022-04-26 ENCOUNTER — OFFICE VISIT (OUTPATIENT)
Dept: FAMILY MEDICINE CLINIC | Age: 72
End: 2022-04-26
Payer: MEDICARE

## 2022-04-26 VITALS
DIASTOLIC BLOOD PRESSURE: 68 MMHG | HEART RATE: 64 BPM | WEIGHT: 218.3 LBS | RESPIRATION RATE: 16 BRPM | BODY MASS INDEX: 32.24 KG/M2 | SYSTOLIC BLOOD PRESSURE: 128 MMHG

## 2022-04-26 DIAGNOSIS — E03.9 ACQUIRED HYPOTHYROIDISM: ICD-10-CM

## 2022-04-26 DIAGNOSIS — M25.50 ARTHRALGIA OF MULTIPLE JOINTS: Primary | ICD-10-CM

## 2022-04-26 DIAGNOSIS — R73.01 IFG (IMPAIRED FASTING GLUCOSE): ICD-10-CM

## 2022-04-26 DIAGNOSIS — Z95.820 S/P ANGIOPLASTY WITH STENT: ICD-10-CM

## 2022-04-26 DIAGNOSIS — I25.110 CORONARY ARTERY DISEASE INVOLVING NATIVE CORONARY ARTERY OF NATIVE HEART WITH UNSTABLE ANGINA PECTORIS (HCC): ICD-10-CM

## 2022-04-26 DIAGNOSIS — E78.1 HYPERTRIGLYCERIDEMIA: ICD-10-CM

## 2022-04-26 DIAGNOSIS — I48.0 PAF (PAROXYSMAL ATRIAL FIBRILLATION) (HCC): ICD-10-CM

## 2022-04-26 DIAGNOSIS — I73.9 PAD (PERIPHERAL ARTERY DISEASE) (HCC): ICD-10-CM

## 2022-04-26 DIAGNOSIS — E78.5 DYSLIPIDEMIA: ICD-10-CM

## 2022-04-26 DIAGNOSIS — J01.40 ACUTE NON-RECURRENT PANSINUSITIS: ICD-10-CM

## 2022-04-26 PROCEDURE — G8427 DOCREV CUR MEDS BY ELIG CLIN: HCPCS | Performed by: NURSE PRACTITIONER

## 2022-04-26 PROCEDURE — 3017F COLORECTAL CA SCREEN DOC REV: CPT | Performed by: NURSE PRACTITIONER

## 2022-04-26 PROCEDURE — 99214 OFFICE O/P EST MOD 30 MIN: CPT | Performed by: NURSE PRACTITIONER

## 2022-04-26 PROCEDURE — 4040F PNEUMOC VAC/ADMIN/RCVD: CPT | Performed by: NURSE PRACTITIONER

## 2022-04-26 PROCEDURE — 1036F TOBACCO NON-USER: CPT | Performed by: NURSE PRACTITIONER

## 2022-04-26 PROCEDURE — 1123F ACP DISCUSS/DSCN MKR DOCD: CPT | Performed by: NURSE PRACTITIONER

## 2022-04-26 PROCEDURE — G8417 CALC BMI ABV UP PARAM F/U: HCPCS | Performed by: NURSE PRACTITIONER

## 2022-04-26 ASSESSMENT — ENCOUNTER SYMPTOMS
ABDOMINAL PAIN: 0
COUGH: 0
BACK PAIN: 0
NAUSEA: 0
SHORTNESS OF BREATH: 0

## 2022-04-26 NOTE — PROGRESS NOTES
Subjective:      Patient ID: Galo Stanley 1950 is a 71 y.o. male here for evaluation. Chief Complaint   Patient presents with    Knee Pain     most pain is bilateral knees    Foot Pain     pain is somewhat better    Ankle Pain     pain is somewhat better       Focused on the knees with pain. Constant ache. Bilateral.   Able to walk it off. Minor stiffness but denies swelling. STATIN intolerance. On Praulant every 14 days    Pain started out with feet, traveled to ankles and knee and then hips. Bilateral joint pain. Denies stiffness or joint swelling. Soreness in joints. No limitations with activity. Denies shooting pain. Denies muscle pains. Denies back pain. TAMELA December 2021 were NEG. Hx of PAD. Patient Active Problem List   Diagnosis    Coronary artery disease involving native coronary artery of native heart with unstable angina pectoris ([address]    Hypertension    Hyperlipidemia    Sleep apnea    Hemorrhoids    GERD (gastroesophageal reflux disease)    Pharyngitis, acute    Back pain    Urinary frequency    Hematuria, undiagnosed cause    OAB (overactive bladder)    BPH associated with nocturia    Tracheobronchitis    Cervical muscle pain    Acute cervical myofascial strain, secondary to fall    DDD (degenerative disc disease), cervical    Skin tag, buttocks.  Chest pain    Angina effort    Dyslipidemia    S/P angioplasty with stent    Erectile dysfunction due to arterial insufficiency    Primary insomnia    Medication monitoring encounter    Bilateral tinnitus    Acquired hypothyroidism    Atrial fibrillation with RVR (HCC)    Atrial flutter (HCC)    PAD (peripheral artery disease) ([address]    Unstable angina (HCC)       COLONOSCOPY - due    CARDIO - Dr. Sunshine Corley    Denies CP, SOB or chest tightness. CAD. On Plavix and Eliquis for AFIB. Atenolol 25 mg BID    Vitals:    04/26/22 0956   BP: 128/68   Pulse: 64   Resp: 16        BP wnl.   ON Diovan .12.5    BP Readings from Last 3 Encounters:   04/26/22 128/68   02/10/22 118/70   12/06/21 134/70         On PRALUENT INJ for Cholesterol.      Synthroid 100 mcg Daily    Lab Results   Component Value Date    LABA1C 5.9 03/24/2021     No results found for: EAG    No components found for: CHLPL  Lab Results   Component Value Date    TRIG 344 (H) 03/24/2021    TRIG 609 (H) 02/08/2021    TRIG 555 (H) 02/07/2021     Lab Results   Component Value Date    HDL 37 03/24/2021    HDL 31 02/08/2021    HDL 32 02/07/2021     Lab Results   Component Value Date    LDLCALC 52 03/24/2021    LDLCALC SEE BELOW 02/08/2021    LDLCALC SEE BELOW 02/07/2021     No results found for: LABVLDL      Chemistry        Component Value Date/Time     02/08/2021 0329    K 3.8 02/08/2021 0329     02/08/2021 0329    CO2 27 02/08/2021 0329    BUN 19 02/08/2021 0329    CREATININE 1.1 02/08/2021 0329        Component Value Date/Time    CALCIUM 9.3 02/08/2021 0329    ALKPHOS 54 12/02/2020 1234    AST 26 12/02/2020 1234    ALT 30 12/02/2020 1234    BILITOT 1.2 12/02/2020 1234            Lab Results   Component Value Date    TSH 2.690 09/09/2020       Lab Results   Component Value Date    WBC 7.2 02/08/2021    HGB 15.5 02/08/2021    HCT 45.0 02/08/2021    MCV 90.5 02/08/2021     02/08/2021         Health Maintenance   Topic Date Due    Hepatitis C screen  Never done    Colorectal Cancer Screen  Never done    Shingles Vaccine (2 of 2) 06/19/2018    TSH  09/09/2021    Annual Wellness Visit (AWV)  09/24/2021    Potassium  02/08/2022    Creatinine  02/08/2022    A1C test (Diabetic or Prediabetic)  03/24/2022    Depression Screen  08/25/2022    DTaP/Tdap/Td vaccine (2 - Td or Tdap) 12/01/2023    Lipids  03/24/2026    Flu vaccine  Completed    Pneumococcal 65+ years Vaccine  Completed    COVID-19 Vaccine  Completed    AAA screen  Completed    Hepatitis A vaccine  Aged Out    Hepatitis B vaccine  Aged Out    Hib vaccine is no abdominal tenderness. Musculoskeletal:         General: No tenderness. Normal range of motion. Cervical back: Normal range of motion and neck supple. Skin:     General: Skin is warm and dry. Findings: No rash. Assessment:       Diagnosis Orders   1. Arthralgia of multiple joints     2. Dyslipidemia  Lipid Panel    Vitamin D 25 Hydroxy   3. Acute non-recurrent pansinusitis     4. IFG (impaired fasting glucose)  CBC    Comprehensive Metabolic Panel    Hemoglobin A1C   5. Hypertriglyceridemia     6. S/P angioplasty with stent     7. Acquired hypothyroidism  TSH with Reflex   8. PAD (peripheral artery disease) ([facility]     9. PAF (paroxysmal atrial fibrillation) ([facility]     10. Coronary artery disease involving native coronary artery of native heart with unstable angina pectoris ([facility]             Plan:      No stiffness or swelling to suggest inflammatory  No neurologic complaints to suggest lumbar stenosis  TAMELA NEG  SE related to Pralunent INJ? Last visit did not hold Pralunent - hold next 2 doses   Vit D Deficiency? Chronic conditions stable  Labs as above  Refills as above  Follow up with Specialists  CRS options discussed - colonoscopy card given  Immunizations discussed  RTO if symptoms worsen or stay the same      Current Outpatient Medications   Medication Sig Dispense Refill    valsartan-hydroCHLOROthiazide (DIOVAN-HCT) 160-12.5 MG per tablet Take 1 tab bid 180 tablet 2    nitroGLYCERIN (NITROSTAT) 0.4 MG SL tablet DISSOLVE ONE TABLET UNDER THE TONGUE EVERY 5 MINUTES AS NEEDED FOR CHEST PAIN.   DO NOT EXCEED A TOTAL OF 3 DOSES IN 15 MINUTES 25 tablet 3    apixaban (ELIQUIS) 5 MG TABS tablet TAKE 1 TABLET BY MOUTH EVERY 12 HOURS 180 tablet 3    atenolol (TENORMIN) 25 MG tablet Take 1 tablet by mouth twice daily 180 tablet 3    clopidogrel (PLAVIX) 75 MG tablet Take 1 tablet by mouth once daily 90 tablet 0    Evolocumab 140 MG/ML SOAJ Inject 140 mg into the skin every 14 days 6 pen 3    SYNTHROID 100 MCG tablet TAKE 1 TABLET BY MOUTH ONCE DAILY WITH  WATER  ON  AN  EMPTY  STOMACH. WAIT  30  MINUTES  BEFORE  EATING  OR  TAKING  OTHER  MEDS. 30 tablet 11    Multiple Vitamins-Minerals (THERAPEUTIC MULTIVITAMIN-MINERALS) tablet Take 1 tablet by mouth daily      Ascorbic Acid (VITAMIN C PO) Take 1,000 mg by mouth daily       Coenzyme Q10 100 MG CAPS Take 100 mg by mouth daily. No current facility-administered medications for this visit.

## 2022-05-15 LAB
ABSOLUTE BASO #: 0 X10E9/L (ref 0–0.2)
ABSOLUTE EOS #: 0.3 X10E9/L (ref 0–0.4)
ABSOLUTE LYMPH #: 2.6 X10E9/L (ref 1–3.5)
ABSOLUTE MONO #: 0.6 X10E9/L (ref 0–0.9)
ABSOLUTE NEUT #: 3.6 X10E9/L (ref 1.5–6.6)
ALBUMIN SERPL-MCNC: 4.5 G/DL (ref 3.2–5.3)
ALK PHOSPHATASE: 53 U/L (ref 39–130)
ALT SERPL-CCNC: 28 U/L (ref 0–40)
ANION GAP SERPL CALCULATED.3IONS-SCNC: 11 MMOL/L (ref 5–15)
AST SERPL-CCNC: 24 U/L (ref 0–41)
AVERAGE GLUCOSE: 143 MG/DL
BASOPHILS RELATIVE PERCENT: 0.2 %
BILIRUB SERPL-MCNC: 1 MG/DL (ref 0.3–1.2)
BUN BLDV-MCNC: 18 MG/DL (ref 5–27)
CALCIUM SERPL-MCNC: 9.7 MG/DL (ref 8.5–10.5)
CHLORIDE BLD-SCNC: 102 MMOL/L (ref 98–109)
CHOLESTEROL/HDL RATIO: 7 (ref 1–5)
CHOLESTEROL: 238 MG/DL (ref 150–200)
CO2: 29 MMOL/L (ref 22–32)
CREAT SERPL-MCNC: 1.25 MG/DL (ref 0.6–1.3)
EGFR AFRICAN AMERICAN: >60 ML/MIN/1.73SQ.M
EGFR IF NONAFRICAN AMERICAN: 57 ML/MIN/1.73SQ.M
EOSINOPHILS RELATIVE PERCENT: 4.3 %
GLUCOSE: 124 MG/DL (ref 65–99)
HBA1C MFR BLD: 6.6 % (ref 4.4–6.4)
HCT VFR BLD CALC: 48.2 % (ref 39–49)
HDLC SERPL-MCNC: 34 MG/DL
HEMOGLOBIN: 16.8 G/DL (ref 13–17)
LDL CHOLESTEROL CALCULATED: ABNORMAL MG/DL
LDL CHOLESTEROL DIRECT: 105 MG/DL
LDL/HDL RATIO: ABNORMAL
LYMPHOCYTE %: 36.3 %
MCH RBC QN AUTO: 31.6 PG (ref 27–34)
MCHC RBC AUTO-ENTMCNC: 35 G/DL (ref 32–36)
MCV RBC AUTO: 90 FL (ref 80–100)
MONOCYTES # BLD: 8.8 %
NEUTROPHILS RELATIVE PERCENT: 50.4 %
PDW BLD-RTO: 13.6 % (ref 11.5–15)
PLATELETS: 170 X10E9/L (ref 150–450)
PMV BLD AUTO: 10.1 FL (ref 7–12)
POTASSIUM SERPL-SCNC: 4.2 MMOL/L (ref 3.5–5)
RBC: 5.33 X10E12/L (ref 4.1–5.7)
SODIUM BLD-SCNC: 142 MMOL/L (ref 134–146)
TOTAL PROTEIN: 7.1 G/DL (ref 6–8)
TRIGL SERPL-MCNC: 597 MG/DL (ref 27–150)
TSH SERPL DL<=0.05 MIU/L-ACNC: 2.32 UIU/ML (ref 0.49–4.67)
VITAMIN D 25-HYDROXY: 26.1 NG/ML (ref 30–100)
VLDLC SERPL CALC-MCNC: 119 MG/DL (ref 0–30)
WBC: 7 X10E9/L (ref 4–11)

## 2022-05-16 RX ORDER — CLOPIDOGREL BISULFATE 75 MG/1
TABLET ORAL
Qty: 90 TABLET | Refills: 2 | Status: SHIPPED | OUTPATIENT
Start: 2022-05-16

## 2022-05-18 ENCOUNTER — OFFICE VISIT (OUTPATIENT)
Dept: FAMILY MEDICINE CLINIC | Age: 72
End: 2022-05-18
Payer: MEDICARE

## 2022-05-18 VITALS
RESPIRATION RATE: 13 BRPM | DIASTOLIC BLOOD PRESSURE: 64 MMHG | SYSTOLIC BLOOD PRESSURE: 116 MMHG | WEIGHT: 216.2 LBS | HEART RATE: 61 BPM | BODY MASS INDEX: 31.93 KG/M2 | OXYGEN SATURATION: 97 %

## 2022-05-18 DIAGNOSIS — I73.9 PAD (PERIPHERAL ARTERY DISEASE) (HCC): ICD-10-CM

## 2022-05-18 DIAGNOSIS — I25.110 CORONARY ARTERY DISEASE INVOLVING NATIVE CORONARY ARTERY OF NATIVE HEART WITH UNSTABLE ANGINA PECTORIS (HCC): ICD-10-CM

## 2022-05-18 DIAGNOSIS — M25.50 ARTHRALGIA OF MULTIPLE JOINTS: Primary | ICD-10-CM

## 2022-05-18 DIAGNOSIS — E78.2 MIXED HYPERLIPIDEMIA: ICD-10-CM

## 2022-05-18 DIAGNOSIS — I48.0 PAF (PAROXYSMAL ATRIAL FIBRILLATION) (HCC): ICD-10-CM

## 2022-05-18 DIAGNOSIS — Z95.820 S/P ANGIOPLASTY WITH STENT: ICD-10-CM

## 2022-05-18 DIAGNOSIS — I10 ESSENTIAL HYPERTENSION: ICD-10-CM

## 2022-05-18 DIAGNOSIS — E55.9 VITAMIN D DEFICIENCY: ICD-10-CM

## 2022-05-18 DIAGNOSIS — E78.1 HYPERTRIGLYCERIDEMIA: ICD-10-CM

## 2022-05-18 DIAGNOSIS — E03.9 ACQUIRED HYPOTHYROIDISM: ICD-10-CM

## 2022-05-18 PROCEDURE — 99214 OFFICE O/P EST MOD 30 MIN: CPT | Performed by: NURSE PRACTITIONER

## 2022-05-18 PROCEDURE — 4040F PNEUMOC VAC/ADMIN/RCVD: CPT | Performed by: NURSE PRACTITIONER

## 2022-05-18 PROCEDURE — G8417 CALC BMI ABV UP PARAM F/U: HCPCS | Performed by: NURSE PRACTITIONER

## 2022-05-18 PROCEDURE — 1036F TOBACCO NON-USER: CPT | Performed by: NURSE PRACTITIONER

## 2022-05-18 PROCEDURE — 3017F COLORECTAL CA SCREEN DOC REV: CPT | Performed by: NURSE PRACTITIONER

## 2022-05-18 PROCEDURE — G8427 DOCREV CUR MEDS BY ELIG CLIN: HCPCS | Performed by: NURSE PRACTITIONER

## 2022-05-18 PROCEDURE — 1123F ACP DISCUSS/DSCN MKR DOCD: CPT | Performed by: NURSE PRACTITIONER

## 2022-05-18 RX ORDER — FENOFIBRATE 160 MG/1
160 TABLET ORAL DAILY
Qty: 90 TABLET | Refills: 1 | Status: SHIPPED | OUTPATIENT
Start: 2022-05-18

## 2022-05-18 ASSESSMENT — ENCOUNTER SYMPTOMS
SHORTNESS OF BREATH: 0
NAUSEA: 0
ABDOMINAL PAIN: 0
BACK PAIN: 0
COUGH: 0

## 2022-05-18 NOTE — PROGRESS NOTES
Subjective:      Patient ID: Antionette Loera 1950 is a 71 y.o. male here for evaluation. Chief Complaint   Patient presents with   [address] Colonoscopy     discuss      Focused on the bilateral knees with pain. Constant ache. Bilateral.   Able to walk it off. Minor stiffness but denies swelling. STATIN intolerance. On Praulant every 14 days - no change in knee pain with being off injections x 1 month    Pain started out with feet, traveled to ankles and knee and then hips. Bilateral joint pain. Suddenness of pain in February. Denies stiffness or joint swelling. Soreness in joints. No limitations with activity. Denies shooting pain. Denies muscle pains. Denies back pain. TAMELA December 2021 were NEG. Hx of PAD. On Plavix and Eliquis    Cholesterol, Total (mg/dL)   Date Value   03/24/2021 158     Cholesterol (mg/dL)   Date Value   05/14/2022 238 (H)     HDL (mg/dL)   Date Value   05/14/2022 34 (L)     Triglycerides (mg/dL)   Date Value   05/14/2022 597 (H)         Patient Active Problem List   Diagnosis    Coronary artery disease involving native coronary artery of native heart with unstable angina pectoris ([facility]    Hypertension    Hyperlipidemia    Sleep apnea    Hemorrhoids    GERD (gastroesophageal reflux disease)    Pharyngitis, acute    Back pain    Urinary frequency    Hematuria, undiagnosed cause    OAB (overactive bladder)    BPH associated with nocturia    Tracheobronchitis    Cervical muscle pain    Acute cervical myofascial strain, secondary to fall    DDD (degenerative disc disease), cervical    Skin tag, buttocks.     Chest pain    Angina effort    Dyslipidemia    S/P angioplasty with stent    Erectile dysfunction due to arterial insufficiency    Primary insomnia    Medication monitoring encounter    Bilateral tinnitus    Acquired hypothyroidism    Atrial fibrillation with RVR (HCC)    Atrial flutter (HCC)    PAD (peripheral artery disease) ([facility]    Unstable angina ([facility]       COLONOSCOPY - due    [facility] Cam[facility] - Dr. Tommy Lala    Denies CP, SOB or chest tightness. CAD. On Plavix and Eliquis for AFIB. Atenolol 25 mg BID    Vitals:    05/18/22 0930   BP: 116/64   Pulse: 61   Resp: 13   SpO2: 97%        BP wnl. ON Diovan .12.5    BP Readings from Last 3 Encounters:   05/18/22 116/64   04/26/22 128/68   02/10/22 118/70         On PRALUENT INJ for Cholesterol.      Synthroid 100 mcg Daily    Lab Results   Component Value Date    LABA1C 6.6 (H) 05/14/2022    LABA1C 5.9 03/24/2021     No results found for: EAG    No components found for: CHLPL  Lab Results   Component Value Date    TRIG 597 (H) 05/14/2022    TRIG 344 (H) 03/24/2021    TRIG 609 (H) 02/08/2021     Lab Results   Component Value Date    HDL 34 (L) 05/14/2022    HDL 37 03/24/2021    HDL 31 02/08/2021     Lab Results   Component Value Date    LDLCALC See Note 05/14/2022    LDLCALC 52 03/24/2021    LDLCALC SEE BELOW 02/08/2021     Lab Results   Component Value Date    LABVLDL 119 (H) 05/14/2022         Chemistry        Component Value Date/Time     05/14/2022 0815    K 4.2 05/14/2022 0815    K 3.8 02/08/2021 0329     05/14/2022 0815    CO2 29 05/14/2022 0815    BUN 18 05/14/2022 0815    CREATININE 1.25 05/14/2022 0815        Component Value Date/Time    CALCIUM 9.7 05/14/2022 0815    ALKPHOS 53 05/14/2022 0815    ALKPHOS 54 12/02/2020 1234    AST 24 05/14/2022 0815    ALT 28 05/14/2022 0815    BILITOT 1.0 05/14/2022 0815            Lab Results   Component Value Date    TSH 2.32 05/14/2022       Lab Results   Component Value Date    WBC 7.0 05/14/2022    HGB 16.8 05/14/2022    HCT 48.2 05/14/2022    MCV 90 05/14/2022     05/14/2022         Health Maintenance   Topic Date Due    Hepatitis C screen  Never done    Colorectal Cancer Screen  Never done    Shingles vaccine (2 of 2) 06/19/2018    Annual Wellness Visit (AWV)  09/24/2021    A1C test (Diabetic or Prediabetic) 08/14/2022    Depression Screen  08/25/2022    DTaP/Tdap/Td vaccine (2 - Td or Tdap) 12/01/2023    Lipids  05/14/2027    Flu vaccine  Completed    Pneumococcal 65+ years Vaccine  Completed    COVID-19 Vaccine  Completed    AAA screen  Completed    Hepatitis A vaccine  Aged Out    Hepatitis B vaccine  Aged Out    Hib vaccine  Aged Out    Meningococcal (ACWY) vaccine  Aged [employer] History   Administered Date(s) Administered    COVID-19, Moderna, Primary or Immunocompromised, PF, 100mcg/0.5mL 02/10/2021, 03/10/2021, 10/30/2021, 04/24/2022    Hepatitis A Adult (Havrix, Vaqta) 02/17/2014    Influenza Virus Vaccine 10/10/2017    Influenza, High Dose (Fluzone 65 yrs and older) 10/07/2018    Influenza, Quadv, adjuvanted, 65 yrs +, IM, PF (Fluad) 10/11/2020, 10/10/2021    Influenza, Triv, inactivated, subunit, adjuvanted, IM (Fluad 65 yrs and older) 10/07/2018, 10/06/2019    Pneumococcal Conjugate 13-valent (Oxktozu20) 10/02/2016, 10/10/2017, 10/06/2019    Pneumococcal Polysaccharide (Eyzxfrcco31) 10/14/2016, 10/13/2017    Pneumococcal conjugate PCV20, PF (Prevnar 20) 04/24/2022    Tdap (Boostrix, Adacel) 12/01/2013    Typhoid Vi capsular polysaccharide (Typhim VI) 02/17/2014    Zoster Recombinant (Shingrix) 04/24/2018       Review of Systems   Constitutional: Negative for chills and fever. HENT: Negative. Respiratory: Negative for cough and shortness of breath. Cardiovascular: Negative for chest pain. Gastrointestinal: Negative for abdominal pain and nausea. Musculoskeletal: Positive for arthralgias. Negative for back pain, gait problem, joint swelling and myalgias. Skin: Negative for rash. Neurological: Negative for dizziness, light-headedness and headaches. Psychiatric/Behavioral: Negative. Objective:   Physical Exam  Constitutional:       General: He is not in acute distress. Eyes:      Pupils: Pupils are equal, round, and reactive to light. Cardiovascular:      Rate and Rhythm: Normal rate and regular rhythm. Heart sounds: No murmur heard. Pulmonary:      Effort: Pulmonary effort is normal.      Breath sounds: Normal breath sounds. No wheezing. Abdominal:      General: Bowel sounds are normal. There is no distension. Palpations: Abdomen is soft. Tenderness: There is no abdominal tenderness. Musculoskeletal:         General: No tenderness. Normal range of motion. Cervical back: Normal range of motion and neck supple. Skin:     General: Skin is warm and dry. Findings: No rash. Assessment:       Diagnosis Orders   1. Arthralgia of multiple joints  Sedimentation Rate    C-reactive protein    Uric Acid    JUAN    Rheumatoid Factor    CCP Antibodies, IGG/IGA   2. Hypertriglyceridemia  fenofibrate (TRIGLIDE) 160 MG tablet   3. Mixed hyperlipidemia     4. Coronary artery disease involving native coronary artery of native heart with unstable angina pectoris ([facility]     5. S/P angioplasty with stent     6. PAF (paroxysmal atrial fibrillation) ([facility]     7. PAD (peripheral artery disease) ([facility]     8. Acquired hypothyroidism     9. Essential hypertension     10.  Vitamin D deficiency             Plan:      Arthritic pain - unable NSAID due to CARDIO and blood thinners  No change with holding Pralunent INJ  Arthritis Panel labs  Joint Supplement and Tumeric  Stay active  Chronic conditions stable  Labs as above   - new onset DM will focus on dietary changes  Refills as above   - Vit D 5000 units   - fenofibrate daily  Follow up with Specialists  Immunizations discussed  RTO in 6 months      Current Outpatient Medications   Medication Sig Dispense Refill    fenofibrate (TRIGLIDE) 160 MG tablet Take 1 tablet by mouth daily 90 tablet 1    clopidogrel (PLAVIX) 75 MG tablet Take 1 tablet by mouth once daily 90 tablet 2    valsartan-hydroCHLOROthiazide (DIOVAN-HCT) 160-12.5 MG per tablet Take 1 tab bid 180 tablet 2    nitroGLYCERIN (NITROSTAT) 0.4 MG SL tablet DISSOLVE ONE TABLET UNDER THE TONGUE EVERY 5 MINUTES AS NEEDED FOR CHEST PAIN. DO NOT EXCEED A TOTAL OF 3 DOSES IN 15 MINUTES 25 tablet 3    apixaban (ELIQUIS) 5 MG TABS tablet TAKE 1 TABLET BY MOUTH EVERY 12 HOURS 180 tablet 3    atenolol (TENORMIN) 25 MG tablet Take 1 tablet by mouth twice daily 180 tablet 3    Evolocumab 140 MG/ML SOAJ Inject 140 mg into the skin every 14 days 6 pen 3    SYNTHROID 100 MCG tablet TAKE 1 TABLET BY MOUTH ONCE DAILY WITH  WATER  ON  AN  EMPTY  STOMACH. WAIT  30  MINUTES  BEFORE  EATING  OR  TAKING  OTHER  MEDS. 30 tablet 11    Multiple Vitamins-Minerals (THERAPEUTIC MULTIVITAMIN-MINERALS) tablet Take 1 tablet by mouth daily      Ascorbic Acid (VITAMIN C PO) Take 1,000 mg by mouth daily       Coenzyme Q10 100 MG CAPS Take 100 mg by mouth daily. No current facility-administered medications for this visit.

## 2022-05-19 LAB
ANTI-NUCLEAR ANTIBODY (ANA): NEGATIVE
C-REACTIVE PROTEIN: <0.1 MG/DL (ref 0–0.74)
CCP IGG ANTIBODIES: <0.5 U/ML
RHEUMATOID FACTOR: <10 IU/ML
SEDIMENTATION RATE, ERYTHROCYTE: 12 MM/H (ref 0–20)
URIC ACID: 10.8 MG/DL (ref 2.6–7.2)

## 2022-09-05 DIAGNOSIS — E03.9 ACQUIRED HYPOTHYROIDISM: ICD-10-CM

## 2022-09-06 RX ORDER — LEVOTHYROXINE SODIUM 100 MCG
TABLET ORAL
Qty: 30 TABLET | Refills: 11 | Status: SHIPPED | OUTPATIENT
Start: 2022-09-06

## 2022-10-20 NOTE — PROGRESS NOTES
Echocardiogram completed bedside, Gave to RONNELL White Pt asking for a letter stating she is still being seen for her rib fx and should stay on restrictions until her surgery as she is having a hard time bending . States has follow up for pre op for surgery and a follow up for her ribs.     Please advise.

## 2022-10-25 RX ORDER — VALSARTAN AND HYDROCHLOROTHIAZIDE 160; 12.5 MG/1; MG/1
TABLET, FILM COATED ORAL
Qty: 180 TABLET | Refills: 0 | Status: SHIPPED | OUTPATIENT
Start: 2022-10-25 | End: 2022-11-16 | Stop reason: SDUPTHER

## 2022-11-10 ENCOUNTER — OFFICE VISIT (OUTPATIENT)
Dept: FAMILY MEDICINE CLINIC | Age: 72
End: 2022-11-10
Payer: MEDICARE

## 2022-11-10 VITALS
WEIGHT: 215.6 LBS | BODY MASS INDEX: 31.84 KG/M2 | DIASTOLIC BLOOD PRESSURE: 74 MMHG | SYSTOLIC BLOOD PRESSURE: 124 MMHG | HEART RATE: 60 BPM | RESPIRATION RATE: 12 BRPM

## 2022-11-10 DIAGNOSIS — G44.40 REBOUND HEADACHE: Primary | ICD-10-CM

## 2022-11-10 PROCEDURE — 1036F TOBACCO NON-USER: CPT | Performed by: NURSE PRACTITIONER

## 2022-11-10 PROCEDURE — 3078F DIAST BP <80 MM HG: CPT | Performed by: NURSE PRACTITIONER

## 2022-11-10 PROCEDURE — 3017F COLORECTAL CA SCREEN DOC REV: CPT | Performed by: NURSE PRACTITIONER

## 2022-11-10 PROCEDURE — 1123F ACP DISCUSS/DSCN MKR DOCD: CPT | Performed by: NURSE PRACTITIONER

## 2022-11-10 PROCEDURE — G8484 FLU IMMUNIZE NO ADMIN: HCPCS | Performed by: NURSE PRACTITIONER

## 2022-11-10 PROCEDURE — 99213 OFFICE O/P EST LOW 20 MIN: CPT | Performed by: NURSE PRACTITIONER

## 2022-11-10 PROCEDURE — G8427 DOCREV CUR MEDS BY ELIG CLIN: HCPCS | Performed by: NURSE PRACTITIONER

## 2022-11-10 PROCEDURE — 3074F SYST BP LT 130 MM HG: CPT | Performed by: NURSE PRACTITIONER

## 2022-11-10 PROCEDURE — G8417 CALC BMI ABV UP PARAM F/U: HCPCS | Performed by: NURSE PRACTITIONER

## 2022-11-10 RX ORDER — NAPROXEN 500 MG/1
500 TABLET ORAL 2 TIMES DAILY PRN
Qty: 8 TABLET | Refills: 0 | Status: SHIPPED | OUTPATIENT
Start: 2022-11-10 | End: 2022-11-14

## 2022-11-10 SDOH — ECONOMIC STABILITY: FOOD INSECURITY: WITHIN THE PAST 12 MONTHS, THE FOOD YOU BOUGHT JUST DIDN'T LAST AND YOU DIDN'T HAVE MONEY TO GET MORE.: NEVER TRUE

## 2022-11-10 SDOH — ECONOMIC STABILITY: FOOD INSECURITY: WITHIN THE PAST 12 MONTHS, YOU WORRIED THAT YOUR FOOD WOULD RUN OUT BEFORE YOU GOT MONEY TO BUY MORE.: NEVER TRUE

## 2022-11-10 ASSESSMENT — PATIENT HEALTH QUESTIONNAIRE - PHQ9
SUM OF ALL RESPONSES TO PHQ QUESTIONS 1-9: 0
SUM OF ALL RESPONSES TO PHQ QUESTIONS 1-9: 0
2. FEELING DOWN, DEPRESSED OR HOPELESS: 0
SUM OF ALL RESPONSES TO PHQ QUESTIONS 1-9: 0
SUM OF ALL RESPONSES TO PHQ QUESTIONS 1-9: 0
1. LITTLE INTEREST OR PLEASURE IN DOING THINGS: 0
SUM OF ALL RESPONSES TO PHQ9 QUESTIONS 1 & 2: 0

## 2022-11-10 ASSESSMENT — ENCOUNTER SYMPTOMS
SHORTNESS OF BREATH: 0
COUGH: 0
VOMITING: 0
NAUSEA: 0
ABDOMINAL PAIN: 0

## 2022-11-10 ASSESSMENT — SOCIAL DETERMINANTS OF HEALTH (SDOH): HOW HARD IS IT FOR YOU TO PAY FOR THE VERY BASICS LIKE FOOD, HOUSING, MEDICAL CARE, AND HEATING?: NOT HARD AT ALL

## 2022-11-10 NOTE — PROGRESS NOTES
Fabiano Hurtado (1950) 71 y.o. male here for evaluation of the following chief complaint(s):      HPI:  Chief Complaint   Patient presents with    Headache     Began last week, some relief with OTC Tylenol Extra Strength     Acute for HA. 1 week duration. Bilateral head pain. Comes and goes. Denies visual changes. Denies extremity weakness. No worsening of HA. Denies waking up from sleep. Denies worse HA of life    Denies URI symptoms. Denies fatigue. No increased stressed. Relief with Tylenol. On Eliquis 5 mg BID. No recent head trauma or fall. Vitals:    11/10/22 1344   BP: 124/74   Pulse: 60   Resp: 12       Patient Active Problem List   Diagnosis    Coronary artery disease involving native coronary artery of native heart with unstable angina pectoris (HCC)    Hypertension    Hyperlipidemia    Sleep apnea    Hemorrhoids    GERD (gastroesophageal reflux disease)    Pharyngitis, acute    Back pain    Urinary frequency    Hematuria, undiagnosed cause    OAB (overactive bladder)    BPH associated with nocturia    Tracheobronchitis    Cervical muscle pain    Acute cervical myofascial strain, secondary to fall    DDD (degenerative disc disease), cervical    Skin tag, buttocks. Chest pain    Angina effort    Dyslipidemia    S/P angioplasty with stent    Erectile dysfunction due to arterial insufficiency    Primary insomnia    Medication monitoring encounter    Bilateral tinnitus    Acquired hypothyroidism    Atrial fibrillation with RVR (HCC)    Atrial flutter (HCC)    PAD (peripheral artery disease) (HCC)    Unstable angina (HCC)       SUBJECTIVE/OBJECTIVE:  Review of Systems   Constitutional:  Negative for chills and fever. HENT: Negative. Eyes:  Negative for visual disturbance. Respiratory:  Negative for cough and shortness of breath. Cardiovascular:  Negative for chest pain. Gastrointestinal:  Negative for abdominal pain, nausea and vomiting.    Skin:  Negative for rash.   Neurological:  Positive for headaches. Negative for dizziness, speech difficulty, weakness, light-headedness and numbness. Psychiatric/Behavioral: Negative. Physical Exam  Constitutional:       General: He is not in acute distress. Eyes:      Pupils: Pupils are equal, round, and reactive to light. Cardiovascular:      Rate and Rhythm: Normal rate and regular rhythm. Heart sounds: No murmur heard. Pulmonary:      Effort: Pulmonary effort is normal.      Breath sounds: Normal breath sounds. No wheezing. Abdominal:      General: Bowel sounds are normal. There is no distension. Palpations: Abdomen is soft. Tenderness: There is no abdominal tenderness. Musculoskeletal:         General: No tenderness. Normal range of motion. Cervical back: Normal range of motion and neck supple. Skin:     General: Skin is warm and dry. Findings: No rash. Neurological:      Cranial Nerves: Cranial nerves 2-12 are intact. Motor: No pronator drift. Coordination: Coordination is intact. Gait: Gait normal.         ASSESSMENT/PLAN:   Diagnosis Orders   1. Rebound headache  naproxen (NAPROSYN) 500 MG tablet            MDM: No Red Flags   Stop Tylenol    Naproxen BID x 4 days - take with food   Discussion on symptoms warrant immediate ED   RTO PRN      An electronic signature was used to authenticate this note.     --Young May, APRN - CNP

## 2022-11-16 ENCOUNTER — OFFICE VISIT (OUTPATIENT)
Dept: FAMILY MEDICINE CLINIC | Age: 72
End: 2022-11-16
Payer: MEDICARE

## 2022-11-16 VITALS
HEART RATE: 60 BPM | HEIGHT: 69 IN | SYSTOLIC BLOOD PRESSURE: 128 MMHG | DIASTOLIC BLOOD PRESSURE: 56 MMHG | BODY MASS INDEX: 31.43 KG/M2 | WEIGHT: 212.2 LBS | RESPIRATION RATE: 18 BRPM

## 2022-11-16 DIAGNOSIS — E55.9 VITAMIN D DEFICIENCY: ICD-10-CM

## 2022-11-16 DIAGNOSIS — E78.2 MIXED HYPERLIPIDEMIA: ICD-10-CM

## 2022-11-16 DIAGNOSIS — I25.110 CORONARY ARTERY DISEASE INVOLVING NATIVE CORONARY ARTERY OF NATIVE HEART WITH UNSTABLE ANGINA PECTORIS (HCC): ICD-10-CM

## 2022-11-16 DIAGNOSIS — R73.01 IFG (IMPAIRED FASTING GLUCOSE): ICD-10-CM

## 2022-11-16 DIAGNOSIS — I48.0 PAF (PAROXYSMAL ATRIAL FIBRILLATION) (HCC): ICD-10-CM

## 2022-11-16 DIAGNOSIS — I10 ESSENTIAL HYPERTENSION: ICD-10-CM

## 2022-11-16 DIAGNOSIS — I73.9 PAD (PERIPHERAL ARTERY DISEASE) (HCC): ICD-10-CM

## 2022-11-16 DIAGNOSIS — Z00.00 MEDICARE ANNUAL WELLNESS VISIT, SUBSEQUENT: Primary | ICD-10-CM

## 2022-11-16 DIAGNOSIS — Z95.820 S/P ANGIOPLASTY WITH STENT: ICD-10-CM

## 2022-11-16 DIAGNOSIS — E78.1 HYPERTRIGLYCERIDEMIA: ICD-10-CM

## 2022-11-16 PROBLEM — I49.1 PREMATURE ATRIAL COMPLEXES: Status: ACTIVE | Noted: 2019-02-14

## 2022-11-16 PROBLEM — E66.811 OBESITY (BMI 30.0-34.9): Status: ACTIVE | Noted: 2019-02-14

## 2022-11-16 PROBLEM — Z95.5 STENTED CORONARY ARTERY: Status: ACTIVE | Noted: 2019-02-14

## 2022-11-16 PROBLEM — E66.9 OBESITY (BMI 30.0-34.9): Status: ACTIVE | Noted: 2019-02-14

## 2022-11-16 PROCEDURE — 3074F SYST BP LT 130 MM HG: CPT | Performed by: NURSE PRACTITIONER

## 2022-11-16 PROCEDURE — 3017F COLORECTAL CA SCREEN DOC REV: CPT | Performed by: NURSE PRACTITIONER

## 2022-11-16 PROCEDURE — 1123F ACP DISCUSS/DSCN MKR DOCD: CPT | Performed by: NURSE PRACTITIONER

## 2022-11-16 PROCEDURE — G8484 FLU IMMUNIZE NO ADMIN: HCPCS | Performed by: NURSE PRACTITIONER

## 2022-11-16 PROCEDURE — 3078F DIAST BP <80 MM HG: CPT | Performed by: NURSE PRACTITIONER

## 2022-11-16 PROCEDURE — G0439 PPPS, SUBSEQ VISIT: HCPCS | Performed by: NURSE PRACTITIONER

## 2022-11-16 RX ORDER — ATENOLOL 25 MG/1
TABLET ORAL
Qty: 180 TABLET | Refills: 3 | Status: SHIPPED | OUTPATIENT
Start: 2022-11-16

## 2022-11-16 RX ORDER — FENOFIBRATE 160 MG/1
160 TABLET ORAL DAILY
Qty: 90 TABLET | Refills: 3 | Status: SHIPPED | OUTPATIENT
Start: 2022-11-16

## 2022-11-16 RX ORDER — VALSARTAN AND HYDROCHLOROTHIAZIDE 160; 12.5 MG/1; MG/1
TABLET, FILM COATED ORAL
Qty: 180 TABLET | Refills: 3 | Status: SHIPPED | OUTPATIENT
Start: 2022-11-16

## 2022-11-16 ASSESSMENT — PATIENT HEALTH QUESTIONNAIRE - PHQ9
2. FEELING DOWN, DEPRESSED OR HOPELESS: 0
SUM OF ALL RESPONSES TO PHQ QUESTIONS 1-9: 0
SUM OF ALL RESPONSES TO PHQ9 QUESTIONS 1 & 2: 0
1. LITTLE INTEREST OR PLEASURE IN DOING THINGS: 0

## 2022-11-16 ASSESSMENT — ENCOUNTER SYMPTOMS
BACK PAIN: 0
NAUSEA: 0
COUGH: 0
ABDOMINAL PAIN: 0
SHORTNESS OF BREATH: 0

## 2022-11-16 ASSESSMENT — LIFESTYLE VARIABLES
HOW MANY STANDARD DRINKS CONTAINING ALCOHOL DO YOU HAVE ON A TYPICAL DAY: 1 OR 2
HOW OFTEN DO YOU HAVE A DRINK CONTAINING ALCOHOL: 2-3 TIMES A WEEK

## 2022-11-16 NOTE — PATIENT INSTRUCTIONS
You may receive a survey regarding the care you received during your visit. Your input is valuable to us. We encourage you to complete and return your survey. We hope you will choose us in the future for your healthcare needs. Personalized Preventive Plan for Thomas Haynes - 11/16/2022  Medicare offers a range of preventive health benefits. Some of the tests and screenings are paid in full while other may be subject to a deductible, co-insurance, and/or copay. Some of these benefits include a comprehensive review of your medical history including lifestyle, illnesses that may run in your family, and various assessments and screenings as appropriate. After reviewing your medical record and screening and assessments performed today your provider may have ordered immunizations, labs, imaging, and/or referrals for you. A list of these orders (if applicable) as well as your Preventive Care list are included within your After Visit Summary for your review. Other Preventive Recommendations:    A preventive eye exam performed by an eye specialist is recommended every 1-2 years to screen for glaucoma; cataracts, macular degeneration, and other eye disorders. A preventive dental visit is recommended every 6 months. Try to get at least 150 minutes of exercise per week or 10,000 steps per day on a pedometer . Order or download the FREE \"Exercise & Physical Activity: Your Everyday Guide\" from [employer]. Call 7-967.522.1878 or search [employer] online. You need 8931-0716 mg of calcium and 4738-9524 IU of vitamin D per day. It is possible to meet your calcium requirement with diet alone, but a vitamin D supplement is usually necessary to meet this goal.  When exposed to the sun, use a sunscreen that protects against both UVA and UVB radiation with an SPF of 30 or greater. Reapply every 2 to 3 hours or after sweating, drying off with a towel, or swimming.   Always wear a seat belt when traveling in a car. Always wear a helmet when riding a bicycle or motorcycle.

## 2022-11-16 NOTE — PROGRESS NOTES
Subjective:      Patient ID: Angi Mccallum 1950 is a 71 y.o. male here for evaluation. Chief Complaint   Patient presents with    Medicare AWV    [address] Maintenance     Needs a colonoscopy       Patient Active Problem List   Diagnosis    Coronary artery disease involving native coronary artery of native heart with unstable angina pectoris (HCC)    Hypertension    Hyperlipidemia    Sleep apnea    Hemorrhoids    GERD (gastroesophageal reflux disease)    Pharyngitis, acute    Back pain    Urinary frequency    Hematuria, undiagnosed cause    OAB (overactive bladder)    BPH associated with nocturia    Tracheobronchitis    Cervical muscle pain    Acute cervical myofascial strain, secondary to fall    DDD (degenerative disc disease), cervical    Skin tag, buttocks. Chest pain    Angina effort    Dyslipidemia    S/P angioplasty with stent    Erectile dysfunction due to arterial insufficiency    Primary insomnia    Medication monitoring encounter    Bilateral tinnitus    Acquired hypothyroidism    Atrial fibrillation with RVR ([facility])    Atrial flutter (HCC)    PAD (peripheral artery disease) ([facility])    Unstable angina (HCC)    Obesity (BMI 30.0-34. 9)    Stented coronary artery    Premature atrial complexes       COLONOSCOPY - due    CARDIO - Dr. Jaxon Uriarte    Denies CP, SOB or chest tightness. CAD. On Plavix and Eliquis for AFIB. TAMELA December 2021 were NEG. Hx of PAD. Focused on the bilateral knees with pain. Constant ache. Bilateral.   Able to walk it off. Minor stiffness but denies swelling. Headache improved from last visit. Less use of tylenol. Atenolol 25 mg BID    Vitals:    11/16/22 0939   BP: (!) 128/56   Pulse: 60   Resp: 18        BP wnl. ON Diovan .12.5    BP Readings from Last 3 Encounters:   11/16/22 (!) 128/56   11/10/22 124/74   05/18/22 116/64        STATIN intolerance.   On Praulant every 14 days    Synthroid 100 mcg Daily    Lab Results   Component Value Date    LABA1C 6.6 (H) 05/14/2022    LABA1C 5.9 03/24/2021     No results found for: EAG    No components found for: CHLPL  Lab Results   Component Value Date    TRIG 597 (H) 05/14/2022    TRIG 344 (H) 03/24/2021    TRIG 609 (H) 02/08/2021     Lab Results   Component Value Date    HDL 34 (L) 05/14/2022    HDL 37 03/24/2021    HDL 31 02/08/2021     Lab Results   Component Value Date    LDLCALC See Note 05/14/2022    LDLCALC 52 03/24/2021    LDLCALC SEE BELOW 02/08/2021     Lab Results   Component Value Date    LABVLDL 119 (H) 05/14/2022         Chemistry        Component Value Date/Time     05/14/2022 0815    K 4.2 05/14/2022 0815    K 3.8 02/08/2021 0329     05/14/2022 0815    CO2 29 05/14/2022 0815    BUN 18 05/14/2022 0815    CREATININE 1.25 05/14/2022 0815        Component Value Date/Time    CALCIUM 9.7 05/14/2022 0815    ALKPHOS 53 05/14/2022 0815    ALKPHOS 54 12/02/2020 1234    AST 24 05/14/2022 0815    ALT 28 05/14/2022 0815    BILITOT 1.0 05/14/2022 0815            Lab Results   Component Value Date    TSH 2.32 05/14/2022       Lab Results   Component Value Date    WBC 7.0 05/14/2022    HGB 16.8 05/14/2022    HCT 48.2 05/14/2022    MCV 90 05/14/2022     05/14/2022         Health Maintenance   Topic Date Due    Hepatitis C screen  Never done    Colorectal Cancer Screen  Never done    Shingles vaccine (2 of 2) 06/19/2018    A1C test (Diabetic or Prediabetic)  08/14/2022    Depression Screen  11/10/2023    Annual Wellness Visit (AWV)  11/17/2023    DTaP/Tdap/Td vaccine (2 - Td or Tdap) 12/01/2023    Lipids  05/14/2027    Flu vaccine  Completed    Pneumococcal 65+ years Vaccine  Completed    COVID-19 Vaccine  Completed    AAA screen  Completed    Hepatitis A vaccine  Aged Out    Hib vaccine  Aged Out    Meningococcal (ACWY) vaccine  Aged Out       Immunization History   Administered Date(s) Administered    COVID-19, MODERNA BLUE border, Primary or Immunocompromised, (age 12y+), IM, 100 mcg/0.5mL 02/10/2021, 03/10/2021, 10/30/2021, 04/24/2022    COVID-19, MODERNA Bivalent BOOSTER, (age 12y+), IM, 50 mcg/0.5 mL 10/09/2022    Hepatitis A Adult (Havrix, Vaqta) 02/17/2014    Influenza Virus Vaccine 10/10/2017    Influenza, FLUAD, (age 72 y+), Adjuvanted, 0.5mL 10/11/2020, 10/10/2021, 10/02/2022    Influenza, High Dose (Fluzone 65 yrs and older) 10/07/2018    Influenza, Triv, inactivated, subunit, adjuvanted, IM (Fluad 65 yrs and older) 10/07/2018, 10/06/2019    Pneumococcal Conjugate 13-valent (Gnlpzps32) 10/02/2016, 10/10/2017, 10/06/2019    Pneumococcal Polysaccharide (Xobkjwrjn21) 10/14/2016, 10/13/2017    Pneumococcal conjugate PCV20, PF (Prevnar 20) 04/24/2022    Tdap (Boostrix, Adacel) 12/01/2013    Typhoid Vi capsular polysaccharide (Typhim VI) 02/17/2014    Zoster Recombinant (Shingrix) 04/24/2018       Review of Systems   Constitutional:  Negative for chills and fever. HENT: Negative. Respiratory:  Negative for cough and shortness of breath. Cardiovascular:  Negative for chest pain. Gastrointestinal:  Negative for abdominal pain and nausea. Musculoskeletal:  Positive for arthralgias. Negative for back pain, gait problem, joint swelling and myalgias. Skin:  Negative for rash. Neurological:  Negative for dizziness, light-headedness and headaches. Psychiatric/Behavioral: Negative. Objective:   Physical Exam  Constitutional:       General: He is not in acute distress. Eyes:      Pupils: Pupils are equal, round, and reactive to light. Cardiovascular:      Rate and Rhythm: Normal rate and regular rhythm. Heart sounds: No murmur heard. Pulmonary:      Effort: Pulmonary effort is normal.      Breath sounds: Normal breath sounds. No wheezing. Abdominal:      General: Bowel sounds are normal. There is no distension. Palpations: Abdomen is soft. Tenderness: There is no abdominal tenderness. Musculoskeletal:         General: No tenderness.  Normal range of motion. Cervical back: Normal range of motion and neck supple. Skin:     General: Skin is warm and dry. Findings: No rash. Assessment:       Diagnosis Orders   1. Medicare annual wellness visit, subsequent        2. Hypertriglyceridemia  fenofibrate (TRIGLIDE) 160 MG tablet      3. Mixed hyperlipidemia  Evolocumab 140 MG/ML SOAJ    Lipid Panel      4. Coronary artery disease involving native coronary artery of native heart with unstable angina pectoris (HCC)  atenolol (TENORMIN) 25 MG tablet      5. S/P angioplasty with stent        6. PAF (paroxysmal atrial fibrillation) ([facility])  apixaban (ELIQUIS) 5 MG TABS tablet      7. PAD (peripheral artery disease) ([facility])  apixaban (ELIQUIS) 5 MG TABS tablet      8. Essential hypertension  valsartan-hydroCHLOROthiazide (DIOVAN-HCT) 160-12.5 MG per tablet      9. Vitamin D deficiency  Vitamin D 25 Hydroxy      10. IFG (impaired fasting glucose)  Hemoglobin A1C              Plan:      Chronic conditions stable  Labs as above to monitor  Refills as above  Follow up with Specialists  Immunizations discussed  CRS options discussed  Stay active  RTO in 6 months      Current Outpatient Medications   Medication Sig Dispense Refill    apixaban (ELIQUIS) 5 MG TABS tablet TAKE 1 TABLET BY MOUTH EVERY 12 HOURS 180 tablet 3    atenolol (TENORMIN) 25 MG tablet Take 1 tablet by mouth twice daily 180 tablet 3    Evolocumab 140 MG/ML SOAJ Inject 140 mg into the skin every 14 days 6 Adjustable Dose Pre-filled Pen Syringe 3    fenofibrate (TRIGLIDE) 160 MG tablet Take 1 tablet by mouth daily 90 tablet 3    valsartan-hydroCHLOROthiazide (DIOVAN-HCT) 160-12.5 MG per tablet Take 1 tablet by mouth twice daily 180 tablet 3    SYNTHROID 100 MCG tablet TAKE 1 TABLET BY MOUTH ONCE DAILY WITH  WATER  ON  AN  EMPTY  STOMACH.   WAIT  30  MINUTES  BEFORE  EATING  OR  TAKING  OTHER  MEDS 30 tablet 11    clopidogrel (PLAVIX) 75 MG tablet Take 1 tablet by mouth once daily 90 tablet 2 nitroGLYCERIN (NITROSTAT) 0.4 MG SL tablet DISSOLVE ONE TABLET UNDER THE TONGUE EVERY 5 MINUTES AS NEEDED FOR CHEST PAIN. DO NOT EXCEED A TOTAL OF 3 DOSES IN 15 MINUTES 25 tablet 3    Multiple Vitamins-Minerals (THERAPEUTIC MULTIVITAMIN-MINERALS) tablet Take 1 tablet by mouth daily      Ascorbic Acid (VITAMIN C PO) Take 1,000 mg by mouth daily       Coenzyme Q10 100 MG CAPS Take 100 mg by mouth daily. naproxen (NAPROSYN) 500 MG tablet Take 1 tablet by mouth 2 times daily as needed for Pain 8 tablet 0     No current facility-administered medications for this visit.

## 2022-11-16 NOTE — PROGRESS NOTES
Medicare Annual Wellness Visit    Fernando Sears is here for Medicare AWV, 6 Month Follow-Up, and Health Maintenance (Needs a colonoscopy)    Assessment & Plan   Hypertriglyceridemia  The following orders have not been finalized:  -     fenofibrate (TRIGLIDE) 160 MG tablet    Recommendations for Preventive Services Due: see orders and patient instructions/AVS.  Recommended screening schedule for the next 5-10 years is provided to the patient in written form: see Patient Instructions/AVS.     No follow-ups on file. Subjective     Patient's complete Health Risk Assessment and screening values have been reviewed and are found in Flowsheets. The following problems were reviewed today and where indicated follow up appointments were made and/or referrals ordered. Positive Risk Factor Screenings with Interventions:     Cognitive: Words recalled: 2 Words Recalled  Clock Drawing Test (CDT): (!) Abnormal  Total Score Interpretation: Abnormal Mini-Cog  Did the patient refuse to take the cognition test?: No  Cognitive Impairment Interventions:  Patient declines any further evaluation/treatment for cognitive impairment            Health Habits/Nutrition:  Physical Activity: Inactive    Days of Exercise per Week: 0 days    Minutes of Exercise per Session: 0 min     Have you lost any weight without trying in the past 3 months?: No  Body mass index: (!) 31.33  Have you seen the dentist within the past year?: Yes  Health Habits/Nutrition Interventions:  Nutritional issues:  educational materials for healthy, well-balanced diet provided    Hearing/Vision:  Do you or your family notice any trouble with your hearing that hasn't been managed with hearing aids?: No  Do you have difficulty driving, watching TV, or doing any of your daily activities because of your eyesight?: No  Have you had an eye exam within the past year?: (!) No  No results found.   Hearing/Vision Interventions:  Vision concerns:  patient encouraged to make appointment with his/her eye specialist            Objective   Vitals:    11/16/22 0939   BP: (!) 128/56   Site: Right Upper Arm   Position: Sitting   Cuff Size: Medium Adult   Pulse: 60   Resp: 18   Weight: 212 lb 3.2 oz (96.3 kg)   Height: 5' 9\" (1.753 m)      Body mass index is 31.34 kg/m². Allergies   Allergen Reactions    Crestor [Rosuvastatin]      Muscle aches    Tape [Adhesive Tape] Rash     Blisters and red       Prior to Visit Medications    Medication Sig Taking? Authorizing Provider   valsartan-hydroCHLOROthiazide (DIOVAN-HCT) 160-12.5 MG per tablet Take 1 tablet by mouth twice daily Yes [address], MD   SYNTHROID 100 MCG tablet TAKE 1 TABLET BY MOUTH ONCE DAILY WITH  WATER  ON  AN  EMPTY  STOMACH. WAIT  30  MINUTES  BEFORE  EATING  OR  TAKING  OTHER  MEDS Yes BASHIR Ramachandran CNP   fenofibrate (TRIGLIDE) 160 MG tablet Take 1 tablet by mouth daily Yes BASHIR Ramachandran CNP   clopidogrel (PLAVIX) 75 MG tablet Take 1 tablet by mouth once daily Yes [address], MD   nitroGLYCERIN (NITROSTAT) 0.4 MG SL tablet DISSOLVE ONE TABLET UNDER THE TONGUE EVERY 5 MINUTES AS NEEDED FOR CHEST PAIN. DO NOT EXCEED A TOTAL OF 3 DOSES IN 15 MINUTES Yes [address], MD   apixaban (ELIQUIS) 5 MG TABS tablet TAKE 1 TABLET BY MOUTH EVERY 12 HOURS Yes [address], MD   atenolol (TENORMIN) 25 MG tablet Take 1 tablet by mouth twice daily Yes [address], MD   Evolocumab 140 MG/ML SOAJ Inject 140 mg into the skin every 14 days Yes [address], MD   Multiple Vitamins-Minerals (THERAPEUTIC MULTIVITAMIN-MINERALS) tablet Take 1 tablet by mouth daily Yes Historical Provider, MD   Ascorbic Acid (VITAMIN C PO) Take 1,000 mg by mouth daily  Yes Historical Provider, MD   Coenzyme Q10 100 MG CAPS Take 100 mg by mouth daily.    Yes Historical Provider, MD   naproxen (NAPROSYN) 500 MG tablet Take 1 tablet by mouth 2 times daily as needed for Pain  BASHIR Ramachandran CNP CareTeam (Including outside providers/suppliers regularly involved in providing care):   Patient Care Team:  BASHIR Santos CNP as PCP - General (Family Nurse Practitioner)  BASHIR Santos CNP as PCP - [facility] Empaneled Provider  [address], MD as Consulting Physician (Cardiology)     Reviewed and updated this visit:  Tobacco  Allergies  Meds  Problems  Med Hx  Surg Hx  Soc Hx  Fam Hx

## 2022-11-27 LAB
CHOLESTEROL/HDL RATIO: 6 RATIO
CHOLESTEROL: 203 MG/DL
HDLC SERPL-MCNC: 34 MG/DL
LDL CHOLESTEROL CALCULATED: 90 MG/DL
LDL/HDL RATIO: 2.6 RATIO
TRIGL SERPL-MCNC: 397 MG/DL
VLDLC SERPL CALC-MCNC: 79 MG/DL

## 2022-12-05 ENCOUNTER — OFFICE VISIT (OUTPATIENT)
Dept: CARDIOLOGY CLINIC | Age: 72
End: 2022-12-05
Payer: MEDICARE

## 2022-12-05 VITALS
DIASTOLIC BLOOD PRESSURE: 68 MMHG | HEART RATE: 60 BPM | SYSTOLIC BLOOD PRESSURE: 134 MMHG | HEIGHT: 69 IN | BODY MASS INDEX: 31.34 KG/M2 | WEIGHT: 211.6 LBS

## 2022-12-05 DIAGNOSIS — E78.01 FAMILIAL HYPERCHOLESTEROLEMIA: ICD-10-CM

## 2022-12-05 DIAGNOSIS — I25.10 CORONARY ARTERY DISEASE INVOLVING NATIVE CORONARY ARTERY OF NATIVE HEART WITHOUT ANGINA PECTORIS: Primary | ICD-10-CM

## 2022-12-05 DIAGNOSIS — I10 PRIMARY HYPERTENSION: ICD-10-CM

## 2022-12-05 PROCEDURE — G8417 CALC BMI ABV UP PARAM F/U: HCPCS | Performed by: NUCLEAR MEDICINE

## 2022-12-05 PROCEDURE — 3078F DIAST BP <80 MM HG: CPT | Performed by: NUCLEAR MEDICINE

## 2022-12-05 PROCEDURE — 93000 ELECTROCARDIOGRAM COMPLETE: CPT | Performed by: NUCLEAR MEDICINE

## 2022-12-05 PROCEDURE — 1123F ACP DISCUSS/DSCN MKR DOCD: CPT | Performed by: NUCLEAR MEDICINE

## 2022-12-05 PROCEDURE — 1036F TOBACCO NON-USER: CPT | Performed by: NUCLEAR MEDICINE

## 2022-12-05 PROCEDURE — G8484 FLU IMMUNIZE NO ADMIN: HCPCS | Performed by: NUCLEAR MEDICINE

## 2022-12-05 PROCEDURE — 3074F SYST BP LT 130 MM HG: CPT | Performed by: NUCLEAR MEDICINE

## 2022-12-05 PROCEDURE — G8427 DOCREV CUR MEDS BY ELIG CLIN: HCPCS | Performed by: NUCLEAR MEDICINE

## 2022-12-05 PROCEDURE — 3017F COLORECTAL CA SCREEN DOC REV: CPT | Performed by: NUCLEAR MEDICINE

## 2022-12-05 PROCEDURE — 99213 OFFICE O/P EST LOW 20 MIN: CPT | Performed by: NUCLEAR MEDICINE

## 2022-12-05 RX ORDER — ICOSAPENT ETHYL 1000 MG/1
2 CAPSULE ORAL 2 TIMES DAILY
Qty: 120 CAPSULE | Refills: 11 | Status: SHIPPED | OUTPATIENT
Start: 2022-12-05

## 2022-12-05 RX ORDER — ICOSAPENT ETHYL 1000 MG/1
2 CAPSULE ORAL 2 TIMES DAILY
COMMUNITY
End: 2022-12-05 | Stop reason: SDUPTHER

## 2022-12-05 NOTE — PROGRESS NOTES
[id number] [facility] [employer] .  [address] [place] [id number]  Dept: [phone]  Dept Fax: [phone]  Loc: [phone]    Visit Date: 12/5/2022    Car Elam is a 71 y.o. male who presents todayfor:  Chief Complaint   Patient presents with    Check-Up    Coronary Artery Disease    Hypertension    Hyperlipidemia   No chest pain   No changes in breathing  Know CAd and risk for CAD  No new symptoms  No dizziness  No syncope  Bp is stable         HPI:  HPI  Past Medical History:   Diagnosis Date    Acquired hypothyroidism 2/21/2019    CAD (coronary artery disease)     Cancer (HCC)     SKIN    DDD (degenerative disc disease), cervical 4/10/2017    GERD (gastroesophageal reflux disease)     Hyperlipidemia     Hypertension       Past Surgical History:   Procedure Laterality Date    CARDIAC CATHETERIZATION  08/20/2004    EF 60%    CARDIAC CATHETERIZATION  03/01/2003    EF 60%    CARDIOVASCULAR STRESS TEST  02/25/2010    EF 63%    CARDIOVASCULAR STRESS TEST  11/17/2006    EF 68%    CARDIOVASCULAR STRESS TEST  08/18/2005    EF 48%    CARDIOVASCULAR STRESS TEST  07/27/2004    EF 63%    CARDIOVASCULAR STRESS TEST  03/18/2003    EF 43%    DIAGNOSTIC CARDIAC CATH LAB PROCEDURE  08/18/2000    EF 70%    PTCA  07/19/2006    CYPHER STENT TO MID RCA    TRANSTHORACIC ECHOCARDIOGRAM  02/25/2010    EF 55-65%     Family History   Problem Relation Age of Onset    High Blood Pressure Mother     High Blood Pressure Father     Diabetes Sister     Heart Disease Maternal Grandmother     High Blood Pressure Maternal Grandmother     High Blood Pressure Maternal Grandfather     High Blood Pressure Paternal Grandmother     High Blood Pressure Paternal Grandfather     Diabetes Brother      Social History     Tobacco Use    Smoking status: Former     Packs/day: 2.00     Years: 20.00     Pack years: 40.00     Types: Cigarettes     Quit date: 5/26/1992     Years since quitting: 30.5    Smokeless tobacco: Never   Substance Use Topics    Alcohol use: Yes     Comment: glass red wine daily      Current Outpatient Medications   Medication Sig Dispense Refill    apixaban (ELIQUIS) 5 MG TABS tablet TAKE 1 TABLET BY MOUTH EVERY 12 HOURS 180 tablet 3    atenolol (TENORMIN) 25 MG tablet Take 1 tablet by mouth twice daily 180 tablet 3    Evolocumab 140 MG/ML SOAJ Inject 140 mg into the skin every 14 days 6 Adjustable Dose Pre-filled Pen Syringe 3    fenofibrate (TRIGLIDE) 160 MG tablet Take 1 tablet by mouth daily 90 tablet 3    valsartan-hydroCHLOROthiazide (DIOVAN-HCT) 160-12.5 MG per tablet Take 1 tablet by mouth twice daily 180 tablet 3    SYNTHROID 100 MCG tablet TAKE 1 TABLET BY MOUTH ONCE DAILY WITH  WATER  ON  AN  EMPTY  STOMACH. WAIT  30  MINUTES  BEFORE  EATING  OR  TAKING  OTHER  MEDS 30 tablet 11    clopidogrel (PLAVIX) 75 MG tablet Take 1 tablet by mouth once daily 90 tablet 2    nitroGLYCERIN (NITROSTAT) 0.4 MG SL tablet DISSOLVE ONE TABLET UNDER THE TONGUE EVERY 5 MINUTES AS NEEDED FOR CHEST PAIN. DO NOT EXCEED A TOTAL OF 3 DOSES IN 15 MINUTES 25 tablet 3    Multiple Vitamins-Minerals (THERAPEUTIC MULTIVITAMIN-MINERALS) tablet Take 1 tablet by mouth daily      Ascorbic Acid (VITAMIN C PO) Take 1,000 mg by mouth daily       Coenzyme Q10 100 MG CAPS Take 100 mg by mouth daily. naproxen (NAPROSYN) 500 MG tablet Take 1 tablet by mouth 2 times daily as needed for Pain 8 tablet 0     No current facility-administered medications for this visit.      Allergies   Allergen Reactions    Crestor [Rosuvastatin]      Muscle aches    Tape Shai Alpers Tape] Rash     Blisters and red     Health Maintenance   Topic Date Due    Diabetic foot exam  Never done    Diabetic microalbuminuria test  Never done    Diabetic retinal exam  Never done    Hepatitis C screen  Never done    Colorectal Cancer Screen  Never done    Shingles vaccine (2 of 2) 06/19/2018    Depression Screen  11/16/2023    Annual Wellness Visit (AWV)  11/17/2023    A1C test (Diabetic or Prediabetic)  11/26/2023    Lipids  11/26/2023    DTaP/Tdap/Td vaccine (2 - Td or Tdap) 12/01/2023    Flu vaccine  Completed    Pneumococcal 65+ years Vaccine  Completed    COVID-19 Vaccine  Completed    AAA screen  Completed    Hepatitis A vaccine  Aged Out    Hib vaccine  Aged Out    Meningococcal (ACWY) vaccine  Aged Out       Subjective:  Review of Systems  General:   No fever, no chills, No fatigue or weight loss  Pulmonary:    No dyspnea, no wheezing  Cardiac:    Denies recent chest pain,   GI:     No nausea or vomiting, no abdominal pain  Neuro:    No dizziness or light headedness,   Musculoskeletal:  No recent active issues  Extremities:   No edema, no obvious claudication     Objective:  Physical Exam  /68   Pulse 60   Ht 5' 9\" (1.753 m)   Wt 211 lb 9.6 oz (96 kg)   BMI 31.25 kg/m²   General:   Well developed, well nourished  Lungs:   Clear to auscultation  Heart:    Normal S1 S2, Slight murmur. no rubs, no gallops  Abdomen:   Soft, non tender, no organomegalies, positive bowel sounds  Extremities:   No edema, no cyanosis, good peripheral pulses  Neurological:   Awake, alert, oriented. No obvious focal deficits  Musculoskelatal:  No obvious deformities    Assessment:      Diagnosis Orders   1. Coronary artery disease involving native coronary artery of native heart without angina pectoris  EKG 12 Lead      2. Primary hypertension        3. Familial hypercholesterolemia        As above  Cardiac fair for now   Still high TG   ECG in office was done today. I reviewed the ECG. No acute findings    Plan:  No follow-ups on file. Discussed  Try vascepa  Continue risk factor modification and medical management  Thank you for allowing me to participate in the care of your patient.  Please don't hesitate to contact me regarding any further issues related to the patient care    Orders Placed:  Orders Placed This Encounter   Procedures    EKG 12 Lead Order Specific Question:   Reason for Exam?     Answer: Other       Medications Prescribed:  No orders of the defined types were placed in this encounter. Discussed use, benefit, and side effects of prescribed medications. All patient questions answered. Pt voicedunderstanding. Instructed to continue current medications, diet and exercise. Continue risk factor modification and medical management. Patient agreed with treatment plan. Follow up as directed.     Electronically signedby Nafisa Ayon MD on 12/5/2022 at 8:27 AM

## 2022-12-15 ENCOUNTER — TELEPHONE (OUTPATIENT)
Dept: FAMILY MEDICINE CLINIC | Age: 72
End: 2022-12-15

## 2022-12-15 DIAGNOSIS — G44.40 REBOUND HEADACHE: ICD-10-CM

## 2022-12-15 RX ORDER — NAPROXEN 500 MG/1
500 TABLET ORAL 2 TIMES DAILY PRN
Qty: 10 TABLET | Refills: 0 | Status: SHIPPED | OUTPATIENT
Start: 2022-12-15 | End: 2022-12-20

## 2022-12-15 NOTE — TELEPHONE ENCOUNTER
Pt called office back stating this pain he gets in the ball joint of his right foot has come and gone over the years. He will try the Naproxen as prescribed, if no better pt will call back to see what the next step is.

## 2022-12-15 NOTE — TELEPHONE ENCOUNTER
----- Message from [facility] sent at 12/15/2022  9:26 AM EST -----  Subject: Message to Provider    QUESTIONS  Information for Provider? Pt stated that he has been seen by PCP for joint   pain in the ball of his right foot. He said he is currently having the   pain again and is asking if PCP can send in medication to pharmacy that he   took before for the pain. He said the meds helped.   ---------------------------------------------------------------------------  --------------  Jose Harrell INFO  [phone]; OK to leave message on voicemail  ---------------------------------------------------------------------------  --------------  SCRIPT ANSWERS  Relationship to Patient?  Self

## 2023-01-10 RX ORDER — CLOPIDOGREL BISULFATE 75 MG/1
TABLET ORAL
Qty: 90 TABLET | Refills: 3 | Status: SHIPPED | OUTPATIENT
Start: 2023-01-10

## 2023-02-17 ENCOUNTER — TELEPHONE (OUTPATIENT)
Dept: FAMILY MEDICINE CLINIC | Age: 73
End: 2023-02-17

## 2023-02-17 DIAGNOSIS — E78.2 MIXED HYPERLIPIDEMIA: Primary | ICD-10-CM

## 2023-02-17 DIAGNOSIS — I25.110 CORONARY ARTERY DISEASE INVOLVING NATIVE CORONARY ARTERY OF NATIVE HEART WITH UNSTABLE ANGINA PECTORIS (HCC): ICD-10-CM

## 2023-02-17 DIAGNOSIS — Z78.9 STATIN INTOLERANCE: ICD-10-CM

## 2023-02-17 RX ORDER — ALIROCUMAB 150 MG/ML
1 INJECTION, SOLUTION SUBCUTANEOUS
Qty: 3 ML | Refills: 3 | Status: SHIPPED | OUTPATIENT
Start: 2023-02-17

## 2023-02-17 NOTE — TELEPHONE ENCOUNTER
Fax received from [employer] stating that the patient has new insurance and Praluent is preferred on the patients plan and are requesting a new script for 90 day supply be sent to his pharmacy. Please advise.

## 2023-02-21 ENCOUNTER — TELEPHONE (OUTPATIENT)
Dept: FAMILY MEDICINE CLINIC | Age: 73
End: 2023-02-21

## 2023-02-21 NOTE — TELEPHONE ENCOUNTER
PA request received from [employer] for Praluent 150mg/ml #3ml for 90 days.  PA submitted online at [email or web address] and pending review.

## 2023-02-23 ENCOUNTER — OFFICE VISIT (OUTPATIENT)
Dept: FAMILY MEDICINE CLINIC | Age: 73
End: 2023-02-23

## 2023-02-23 VITALS
HEART RATE: 60 BPM | DIASTOLIC BLOOD PRESSURE: 56 MMHG | SYSTOLIC BLOOD PRESSURE: 118 MMHG | BODY MASS INDEX: 30.44 KG/M2 | RESPIRATION RATE: 16 BRPM | WEIGHT: 206.1 LBS

## 2023-02-23 DIAGNOSIS — E11.9 TYPE 2 DIABETES MELLITUS WITHOUT COMPLICATION, WITHOUT LONG-TERM CURRENT USE OF INSULIN (HCC): ICD-10-CM

## 2023-02-23 DIAGNOSIS — R30.0 DYSURIA: ICD-10-CM

## 2023-02-23 DIAGNOSIS — I20.8 EXERCISE-INDUCED ANGINA (HCC): ICD-10-CM

## 2023-02-23 DIAGNOSIS — N41.0 ACUTE PROSTATITIS: Primary | ICD-10-CM

## 2023-02-23 DIAGNOSIS — I48.0 PAF (PAROXYSMAL ATRIAL FIBRILLATION) (HCC): ICD-10-CM

## 2023-02-23 DIAGNOSIS — I73.9 PAD (PERIPHERAL ARTERY DISEASE) (HCC): ICD-10-CM

## 2023-02-23 RX ORDER — CIPROFLOXACIN 500 MG/1
500 TABLET, FILM COATED ORAL 2 TIMES DAILY
Qty: 20 TABLET | Refills: 0 | Status: SHIPPED | OUTPATIENT
Start: 2023-02-23 | End: 2023-03-05

## 2023-02-23 SDOH — ECONOMIC STABILITY: HOUSING INSECURITY
IN THE LAST 12 MONTHS, WAS THERE A TIME WHEN YOU DID NOT HAVE A STEADY PLACE TO SLEEP OR SLEPT IN A SHELTER (INCLUDING NOW)?: NO

## 2023-02-23 SDOH — ECONOMIC STABILITY: INCOME INSECURITY: HOW HARD IS IT FOR YOU TO PAY FOR THE VERY BASICS LIKE FOOD, HOUSING, MEDICAL CARE, AND HEATING?: NOT HARD AT ALL

## 2023-02-23 SDOH — ECONOMIC STABILITY: FOOD INSECURITY: WITHIN THE PAST 12 MONTHS, YOU WORRIED THAT YOUR FOOD WOULD RUN OUT BEFORE YOU GOT MONEY TO BUY MORE.: NEVER TRUE

## 2023-02-23 SDOH — ECONOMIC STABILITY: FOOD INSECURITY: WITHIN THE PAST 12 MONTHS, THE FOOD YOU BOUGHT JUST DIDN'T LAST AND YOU DIDN'T HAVE MONEY TO GET MORE.: NEVER TRUE

## 2023-02-23 ASSESSMENT — ENCOUNTER SYMPTOMS
NAUSEA: 0
ABDOMINAL PAIN: 0
COUGH: 0
BACK PAIN: 0
SHORTNESS OF BREATH: 0

## 2023-02-23 ASSESSMENT — PATIENT HEALTH QUESTIONNAIRE - PHQ9
SUM OF ALL RESPONSES TO PHQ QUESTIONS 1-9: 0
SUM OF ALL RESPONSES TO PHQ9 QUESTIONS 1 & 2: 0
SUM OF ALL RESPONSES TO PHQ QUESTIONS 1-9: 0
2. FEELING DOWN, DEPRESSED OR HOPELESS: 0
SUM OF ALL RESPONSES TO PHQ QUESTIONS 1-9: 0
SUM OF ALL RESPONSES TO PHQ QUESTIONS 1-9: 0
1. LITTLE INTEREST OR PLEASURE IN DOING THINGS: 0

## 2023-02-23 NOTE — PROGRESS NOTES
Subjective:      Patient ID: Kalpesh Coronado 1950 is a 71 y.o. male here for evaluation. Chief Complaint   Patient presents with    Dysuria    Urinary Frequency       Onset of 3 days with burning and urinary frequency. Weak stream.  Hard to urinate. Nocturia. Denies fever or chills. No abdominal pressure. Issues in the past with similar symptoms. Patient Active Problem List   Diagnosis    Coronary artery disease involving native coronary artery of native heart with unstable angina pectoris ([facility])    Hypertension    Hyperlipidemia    Sleep apnea    Hemorrhoids    GERD (gastroesophageal reflux disease)    Pharyngitis, acute    Back pain    Urinary frequency    Hematuria, undiagnosed cause    OAB (overactive bladder)    BPH associated with nocturia    Tracheobronchitis    Cervical muscle pain    Acute cervical myofascial strain, secondary to fall    DDD (degenerative disc disease), cervical    Skin tag, buttocks. Chest pain    Angina effort    Dyslipidemia    S/P angioplasty with stent    Erectile dysfunction due to arterial insufficiency    Primary insomnia    Medication monitoring encounter    Bilateral tinnitus    Acquired hypothyroidism    Atrial fibrillation with RVR ([facility])    Atrial flutter ([facility])    PAD (peripheral artery disease) ([facility])    Unstable angina ([facility])    Obesity (BMI 30.0-34. 9)    Stented coronary artery    Premature atrial complexes    Type 2 diabetes mellitus       COLONOSCOPY - due    CARDIO - Dr. Alex Valadez    Denies CP, SOB or chest tightness. CAD. On Plavix and Eliquis for AFIB. TAMELA December 2021 were NEG. Hx of PAD. Focused on the bilateral knees with pain. Constant ache. Bilateral.   Able to walk it off. Minor stiffness but denies swelling. Headache improved from last visit. Less use of tylenol. Atenolol 25 mg BID    Vitals:    02/23/23 0934   BP: (!) 118/56   Pulse: 60   Resp: 16        BP wnl.   ON Diovan .12.5    BP Readings from Last 3 Encounters:   02/23/23 (!) 118/56   12/05/22 134/68   11/16/22 (!) 128/56        STATIN intolerance.   On Praulant every 14 days    Synthroid 100 mcg Daily    Lab Results   Component Value Date    LABA1C 6.5 (H) 11/26/2022    LABA1C 6.6 (H) 05/14/2022    LABA1C 5.9 03/24/2021     No results found for: EAG    No components found for: CHLPL  Lab Results   Component Value Date    TRIG 397 (H) 11/26/2022    TRIG 597 (H) 05/14/2022    TRIG 344 (H) 03/24/2021     Lab Results   Component Value Date    HDL 34 (L) 11/26/2022    HDL 34 (L) 05/14/2022    HDL 37 03/24/2021     Lab Results   Component Value Date    LDLCALC 90 11/26/2022    LDLCALC See Note 05/14/2022    LDLCALC 52 03/24/2021     Lab Results   Component Value Date    LABVLDL 79 (H) 11/26/2022    LABVLDL 119 (H) 05/14/2022         Chemistry        Component Value Date/Time     05/14/2022 0815    K 4.2 05/14/2022 0815    K 3.8 02/08/2021 0329     05/14/2022 0815    CO2 29 05/14/2022 0815    BUN 18 05/14/2022 0815    CREATININE 1.25 05/14/2022 0815        Component Value Date/Time    CALCIUM 9.7 05/14/2022 0815    ALKPHOS 53 05/14/2022 0815    ALKPHOS 54 12/02/2020 1234    AST 24 05/14/2022 0815    ALT 28 05/14/2022 0815    BILITOT 1.0 05/14/2022 0815            Lab Results   Component Value Date    TSH 2.32 05/14/2022       Lab Results   Component Value Date    WBC 7.0 05/14/2022    HGB 16.8 05/14/2022    HCT 48.2 05/14/2022    MCV 90 05/14/2022     05/14/2022         Health Maintenance   Topic Date Due    Hepatitis C screen  Never done    Colorectal Cancer Screen  Never done    Shingles vaccine (2 of 2) 06/19/2018    Depression Screen  11/16/2023    Annual Wellness Visit (AWV)  11/17/2023    A1C test (Diabetic or Prediabetic)  11/26/2023    Lipids  11/26/2023    DTaP/Tdap/Td vaccine (2 - Td or Tdap) 12/01/2023    Flu vaccine  Completed    Pneumococcal 65+ years Vaccine  Completed    COVID-19 Vaccine  Completed    AAA screen  Completed Hepatitis A vaccine  Aged Out    Hib vaccine  Aged Out    Meningococcal (ACWY) vaccine  Aged Out       Immunization History   Administered Date(s) Administered    COVID-19, MODERNA BLUE border, Primary or Immunocompromised, (age 12y+), IM, 100 mcg/0.5mL 02/10/2021, 03/10/2021, 10/30/2021, 04/24/2022    COVID-19, MODERNA Bivalent BOOSTER, (age 12y+), IM, 50 mcg/0.5 mL 10/09/2022    Hepatitis A Adult (Havrix, Vaqta) 02/17/2014    Influenza Virus Vaccine 10/10/2017    Influenza, FLUAD, (age 72 y+), Adjuvanted, 0.5mL 10/11/2020, 10/10/2021, 10/02/2022    Influenza, High Dose (Fluzone 65 yrs and older) 10/07/2018    Influenza, Triv, inactivated, subunit, adjuvanted, IM (Fluad 65 yrs and older) 10/07/2018, 10/06/2019    Pneumococcal Conjugate 13-valent ([id number]) 10/02/2016, 10/10/2017, 10/06/2019    Pneumococcal Polysaccharide ([id number]) 10/14/2016, 10/13/2017    Pneumococcal conjugate PCV20, PF (Prevnar 20) 04/24/2022    Tdap (Boostrix, Adacel) 12/01/2013    Typhoid Vi capsular polysaccharide (Typhim VI) 02/17/2014    Zoster Recombinant (Shingrix) 04/24/2018       Review of Systems   Constitutional:  Negative for chills and fever. HENT: Negative. Respiratory:  Negative for cough and shortness of breath. Cardiovascular:  Negative for chest pain. Gastrointestinal:  Negative for abdominal pain and nausea. Genitourinary:  Positive for difficulty urinating, dysuria, frequency and urgency. Musculoskeletal:  Positive for arthralgias. Negative for back pain, gait problem, joint swelling and myalgias. Skin:  Negative for rash. Neurological:  Negative for dizziness, light-headedness and headaches. Psychiatric/Behavioral: Negative. Objective:   Physical Exam  Constitutional:       General: He is not in acute distress. Eyes:      Pupils: Pupils are equal, round, and reactive to light. Cardiovascular:      Rate and Rhythm: Normal rate and regular rhythm.       Heart sounds: No murmur heard.  Pulmonary:      Effort: Pulmonary effort is normal.      Breath sounds: Normal breath sounds. No wheezing. Abdominal:      General: Bowel sounds are normal. There is no distension. Palpations: Abdomen is soft. Tenderness: There is no abdominal tenderness. Musculoskeletal:         General: No tenderness. Normal range of motion. Cervical back: Normal range of motion and neck supple. Skin:     General: Skin is warm and dry. Findings: No rash. Assessment:       Diagnosis Orders   1. Acute prostatitis  ciprofloxacin (CIPRO) 500 MG tablet      2. Dysuria  POCT Urinalysis No Micro (Auto)      3. Type 2 diabetes mellitus without complication, without long-term current use of insulin ([facility]        4. PAD (peripheral artery disease) ([facility]        5. PAF (paroxysmal atrial fibrillation) ([facility]        6. Exercise-induced angina (HCC)                Plan:      Unable obtain urine in office  Hx of UTI in past  Cipro BID x 10 days  Push fluids  KNA      Current Outpatient Medications   Medication Sig Dispense Refill    ciprofloxacin (CIPRO) 500 MG tablet Take 1 tablet by mouth 2 times daily for 10 days 20 tablet 0    Alirocumab (PRALUENT) 150 MG/ML SOAJ Inject 1 mL into the skin every 30 days 3 mL 3    clopidogrel (PLAVIX) 75 MG tablet Take 1 tablet by mouth once daily 90 tablet 3    Icosapent Ethyl (VASCEPA) 1 g CAPS capsule Take 2 capsules by mouth 2 times daily 120 capsule 11    apixaban (ELIQUIS) 5 MG TABS tablet TAKE 1 TABLET BY MOUTH EVERY 12 HOURS 180 tablet 3    atenolol (TENORMIN) 25 MG tablet Take 1 tablet by mouth twice daily 180 tablet 3    fenofibrate (TRIGLIDE) 160 MG tablet Take 1 tablet by mouth daily 90 tablet 3    valsartan-hydroCHLOROthiazide (DIOVAN-HCT) 160-12.5 MG per tablet Take 1 tablet by mouth twice daily 180 tablet 3    SYNTHROID 100 MCG tablet TAKE 1 TABLET BY MOUTH ONCE DAILY WITH  WATER  ON  AN  EMPTY  STOMACH.   WAIT  30  MINUTES  BEFORE  EATING  OR  TAKING  OTHER MEDS 30 tablet 11    nitroGLYCERIN (NITROSTAT) 0.4 MG SL tablet DISSOLVE ONE TABLET UNDER THE TONGUE EVERY 5 MINUTES AS NEEDED FOR CHEST PAIN. DO NOT EXCEED A TOTAL OF 3 DOSES IN 15 MINUTES 25 tablet 3    Multiple Vitamins-Minerals (THERAPEUTIC MULTIVITAMIN-MINERALS) tablet Take 1 tablet by mouth daily      Ascorbic Acid (VITAMIN C PO) Take 1,000 mg by mouth daily       Coenzyme Q10 100 MG CAPS Take 100 mg by mouth daily. naproxen (NAPROSYN) 500 MG tablet Take 1 tablet by mouth 2 times daily as needed for Pain 10 tablet 0     No current facility-administered medications for this visit.

## 2023-03-02 ENCOUNTER — TELEPHONE (OUTPATIENT)
Dept: FAMILY MEDICINE CLINIC | Age: 73
End: 2023-03-02

## 2023-03-02 NOTE — TELEPHONE ENCOUNTER
Has the covered formulary alternative, candesartan/ hydrochlorothiazide, been tried and failed, is likely to cause adverse reaction or be ineffective? Lisandra Buck Has the covered formulary alternative, telmisartan/ hydrochlorothiazide, been tried and failed, is likely to cause adverse reaction or be ineffective?

## 2023-03-02 NOTE — TELEPHONE ENCOUNTER
ey: [id number] - PA Case ID: [id number] help? Call us at [phone]  Outcome  Approved today  Your request has been approved  Drug  Diovan -12.5MG tablets      [facility] notified Ziggy Lezama who voiced understanding.

## 2023-03-02 NOTE — TELEPHONE ENCOUNTER
[address] [facility] fax letter Valsartin/HCTZ [phone] is NOT covered on insurance. Cover my meds PA submitted.

## 2023-03-02 NOTE — TELEPHONE ENCOUNTER
Per cover my meds. Has the covered formulary alternative, losartan/ hydrochlorothiazide, been tried and failed, is likely to cause adverse reaction or be ineffective?

## 2023-03-14 ENCOUNTER — TELEPHONE (OUTPATIENT)
Dept: FAMILY MEDICINE CLINIC | Age: 73
End: 2023-03-14

## 2023-03-14 DIAGNOSIS — I10 ESSENTIAL HYPERTENSION: Primary | ICD-10-CM

## 2023-03-14 RX ORDER — VALSARTAN AND HYDROCHLOROTHIAZIDE 320; 25 MG/1; MG/1
0.5 TABLET, FILM COATED ORAL 2 TIMES DAILY
Qty: 90 TABLET | Refills: 3 | Status: SHIPPED | OUTPATIENT
Start: 2023-03-14

## 2023-03-14 NOTE — TELEPHONE ENCOUNTER
New dose sent in on valsartan-hctz which is double the previous - but instruct him to take 1/2 tab BID which will equal what he was taking previous at 1 tab BID. So the amount of medicaiton will be the same just 1/2 tab vs full tab BID. This will satisfy the 30 tabs per 30 day per insurance.

## 2023-03-14 NOTE — TELEPHONE ENCOUNTER
See 3/2/23 TE. Wife Raissa Garcíat on HIPAA called office stating pt had received the Valsartin-HCTZ BID at the beginning of this month. But this was only for a temporary supply, they received a letter in the mail regarding. We also have a copy of this letter, see Media Tab dated 3/3/2023 for this PA approval of temporary supply. Wife spoke with pt insurance who says there is a quantity limit for the Valsartin-HCTZ, they will only dispense #30 pills for a 30day supply. Pt will need to be switched to another medication. Pt is already on Temormin BID and his bp is well controlled with that and the Valsartin HCTZ. Please advise.

## 2023-03-23 ENCOUNTER — TELEMEDICINE (OUTPATIENT)
Dept: FAMILY MEDICINE CLINIC | Age: 73
End: 2023-03-23

## 2023-03-23 DIAGNOSIS — J32.9 RHINOSINUSITIS: Primary | ICD-10-CM

## 2023-03-23 DIAGNOSIS — R05.1 ACUTE COUGH: ICD-10-CM

## 2023-03-23 DIAGNOSIS — J31.0 RHINOSINUSITIS: Primary | ICD-10-CM

## 2023-03-23 RX ORDER — DOXYCYCLINE HYCLATE 100 MG
100 TABLET ORAL 2 TIMES DAILY
Qty: 20 TABLET | Refills: 0 | Status: SHIPPED | OUTPATIENT
Start: 2023-03-23 | End: 2023-04-02

## 2023-03-23 ASSESSMENT — ENCOUNTER SYMPTOMS
NAUSEA: 0
SHORTNESS OF BREATH: 0
RHINORRHEA: 1
COUGH: 1
ABDOMINAL PAIN: 0

## 2023-04-16 ENCOUNTER — HOSPITAL ENCOUNTER (INPATIENT)
Age: 73
LOS: 2 days | Discharge: HOME | DRG: 303 | End: 2023-04-18
Attending: EMERGENCY MEDICINE | Admitting: STUDENT IN AN ORGANIZED HEALTH CARE EDUCATION/TRAINING PROGRAM
Payer: MEDICARE

## 2023-04-16 ENCOUNTER — APPOINTMENT (OUTPATIENT)
Dept: GENERAL RADIOLOGY | Age: 73
DRG: 303 | End: 2023-04-16
Payer: MEDICARE

## 2023-04-16 ENCOUNTER — APPOINTMENT (OUTPATIENT)
Dept: CT IMAGING | Age: 73
DRG: 303 | End: 2023-04-16
Payer: MEDICARE

## 2023-04-16 DIAGNOSIS — R55 SYNCOPE AND COLLAPSE: Primary | ICD-10-CM

## 2023-04-16 DIAGNOSIS — I25.110 CORONARY ARTERY DISEASE INVOLVING NATIVE CORONARY ARTERY OF NATIVE HEART WITH UNSTABLE ANGINA PECTORIS (HCC): ICD-10-CM

## 2023-04-16 DIAGNOSIS — I10 PRIMARY HYPERTENSION: ICD-10-CM

## 2023-04-16 LAB
ALBUMIN SERPL BCG-MCNC: 4.7 G/DL (ref 3.5–5.1)
ALP SERPL-CCNC: 33 U/L (ref 38–126)
ALT SERPL W/O P-5'-P-CCNC: 18 U/L (ref 11–66)
ANION GAP SERPL CALC-SCNC: 14 MEQ/L (ref 8–16)
APTT PPP: 33.4 SECONDS (ref 22–38)
AST SERPL-CCNC: 22 U/L (ref 5–40)
BACTERIA: NORMAL
BASOPHILS ABSOLUTE: 0.1 THOU/MM3 (ref 0–0.1)
BASOPHILS NFR BLD AUTO: 0.6 %
BILIRUB SERPL-MCNC: 0.6 MG/DL (ref 0.3–1.2)
BILIRUB UR QL STRIP: NEGATIVE
BUN SERPL-MCNC: 22 MG/DL (ref 7–22)
CALCIUM SERPL-MCNC: 9.8 MG/DL (ref 8.5–10.5)
CASTS #/AREA URNS LPF: NORMAL /LPF
CASTS #/AREA URNS LPF: NORMAL /LPF
CHARACTER UR: CLEAR
CHARCOAL URNS QL MICRO: NORMAL
CHLORIDE 24H UR-SRATE: 86 MEQ/L
CHLORIDE SERPL-SCNC: 101 MEQ/L (ref 98–111)
CO2 SERPL-SCNC: 24 MEQ/L (ref 23–33)
COLOR UR: YELLOW
CREAT SERPL-MCNC: 1.3 MG/DL (ref 0.4–1.2)
CREAT UR-MCNC: 137.2 MG/DL
CRYSTALS URNS QL MICRO: NORMAL
DEPRECATED RDW RBC AUTO: 43.7 FL (ref 35–45)
EOSINOPHIL NFR BLD AUTO: 0.6 %
EOSINOPHILS ABSOLUTE: 0.1 THOU/MM3 (ref 0–0.4)
EPITHELIAL CELLS, UA: NORMAL /HPF
ERYTHROCYTE [DISTWIDTH] IN BLOOD BY AUTOMATED COUNT: 13.2 % (ref 11.5–14.5)
GFR SERPL CREATININE-BSD FRML MDRD: 58 ML/MIN/1.73M2
GLUCOSE BLD-MCNC: 132 MG/DL
GLUCOSE SERPL-MCNC: 132 MG/DL (ref 70–108)
GLUCOSE UR QL STRIP.AUTO: NEGATIVE MG/DL
HCT VFR BLD AUTO: 44.5 % (ref 42–52)
HEPARIN UNFRACTIONATED: 1.81 U/ML (ref 0.3–0.7)
HGB BLD-MCNC: 15.1 GM/DL (ref 14–18)
HGB UR QL STRIP.AUTO: NEGATIVE
IMM GRANULOCYTES # BLD AUTO: 0.02 THOU/MM3 (ref 0–0.07)
IMM GRANULOCYTES NFR BLD AUTO: 0.2 %
INR PPP: 1.31 (ref 0.85–1.13)
KETONES UR QL STRIP.AUTO: NEGATIVE
LEUKOCYTE ESTERASE UR QL STRIP.AUTO: NEGATIVE
LYMPHOCYTES ABSOLUTE: 1.4 THOU/MM3 (ref 1–4.8)
LYMPHOCYTES NFR BLD AUTO: 16.3 %
MAGNESIUM SERPL-MCNC: 2.1 MG/DL (ref 1.6–2.4)
MCH RBC QN AUTO: 30.9 PG (ref 26–33)
MCHC RBC AUTO-ENTMCNC: 33.9 GM/DL (ref 32.2–35.5)
MCV RBC AUTO: 91.2 FL (ref 80–94)
MONOCYTES ABSOLUTE: 0.6 THOU/MM3 (ref 0.4–1.3)
MONOCYTES NFR BLD AUTO: 6.8 %
NEUTROPHILS NFR BLD AUTO: 75.5 %
NITRITE UR QL STRIP.AUTO: NEGATIVE
NRBC BLD AUTO-RTO: 0 /100 WBC
NT-PROBNP SERPL IA-MCNC: 63.1 PG/ML (ref 0–124)
OSMOLALITY SERPL CALC.SUM OF ELEC: 282.7 MOSMOL/KG (ref 275–300)
OSMOLALITY SERPL: 294 MOSMOL/KG (ref 275–295)
OSMOLALITY UR: 610 MOSMOL/KG (ref 250–750)
PH UR STRIP.AUTO: 6 [PH] (ref 5–9)
PLATELET # BLD AUTO: 197 THOU/MM3 (ref 130–400)
PMV BLD AUTO: 11.1 FL (ref 9.4–12.4)
POTASSIUM SERPL-SCNC: 4 MEQ/L (ref 3.5–5.2)
POTASSIUM UR-SCNC: 67.8 MEQ/L
PROT SERPL-MCNC: 7.4 G/DL (ref 6.1–8)
PROT UR STRIP.AUTO-MCNC: NEGATIVE MG/DL
RBC # BLD AUTO: 4.88 MILL/MM3 (ref 4.7–6.1)
RBC #/AREA URNS HPF: NORMAL /HPF
RENAL EPI CELLS #/AREA URNS HPF: NORMAL /[HPF]
SEGMENTED NEUTROPHILS ABSOLUTE COUNT: 6.6 THOU/MM3 (ref 1.8–7.7)
SODIUM SERPL-SCNC: 139 MEQ/L (ref 135–145)
SODIUM UR-SCNC: 79 MEQ/L
SPECIFIC GRAVITY UA: 1.02 (ref 1–1.03)
TROPONIN T: < 0.01 NG/ML
TSH SERPL DL<=0.005 MIU/L-ACNC: 1.93 UIU/ML (ref 0.4–4.2)
UROBILINOGEN, URINE: 1 EU/DL (ref 0–1)
UUN 24H UR-MCNC: 815 MG/DL
WBC # BLD AUTO: 8.7 THOU/MM3 (ref 4.8–10.8)
WBC #/AREA URNS HPF: NORMAL /HPF
YEAST LIKE FUNGI URNS QL MICRO: NORMAL

## 2023-04-16 PROCEDURE — 83930 ASSAY OF BLOOD OSMOLALITY: CPT

## 2023-04-16 PROCEDURE — 84484 ASSAY OF TROPONIN QUANT: CPT

## 2023-04-16 PROCEDURE — 83935 ASSAY OF URINE OSMOLALITY: CPT

## 2023-04-16 PROCEDURE — 93005 ELECTROCARDIOGRAM TRACING: CPT

## 2023-04-16 PROCEDURE — 93005 ELECTROCARDIOGRAM TRACING: CPT | Performed by: NURSE PRACTITIONER

## 2023-04-16 PROCEDURE — 85520 HEPARIN ASSAY: CPT

## 2023-04-16 PROCEDURE — 70450 CT HEAD/BRAIN W/O DYE: CPT

## 2023-04-16 PROCEDURE — 6370000000 HC RX 637 (ALT 250 FOR IP)

## 2023-04-16 PROCEDURE — 2580000003 HC RX 258: Performed by: NURSE PRACTITIONER

## 2023-04-16 PROCEDURE — 99285 EMERGENCY DEPT VISIT HI MDM: CPT

## 2023-04-16 PROCEDURE — 2060000000 HC ICU INTERMEDIATE R&B

## 2023-04-16 PROCEDURE — 82436 ASSAY OF URINE CHLORIDE: CPT

## 2023-04-16 PROCEDURE — 6360000002 HC RX W HCPCS

## 2023-04-16 PROCEDURE — 83735 ASSAY OF MAGNESIUM: CPT

## 2023-04-16 PROCEDURE — 84133 ASSAY OF URINE POTASSIUM: CPT

## 2023-04-16 PROCEDURE — 85730 THROMBOPLASTIN TIME PARTIAL: CPT

## 2023-04-16 PROCEDURE — 82570 ASSAY OF URINE CREATININE: CPT

## 2023-04-16 PROCEDURE — 81001 URINALYSIS AUTO W/SCOPE: CPT

## 2023-04-16 PROCEDURE — 84443 ASSAY THYROID STIM HORMONE: CPT

## 2023-04-16 PROCEDURE — 2580000003 HC RX 258

## 2023-04-16 PROCEDURE — 99223 1ST HOSP IP/OBS HIGH 75: CPT | Performed by: STUDENT IN AN ORGANIZED HEALTH CARE EDUCATION/TRAINING PROGRAM

## 2023-04-16 PROCEDURE — 85025 COMPLETE CBC W/AUTO DIFF WBC: CPT

## 2023-04-16 PROCEDURE — 84540 ASSAY OF URINE/UREA-N: CPT

## 2023-04-16 PROCEDURE — 71046 X-RAY EXAM CHEST 2 VIEWS: CPT

## 2023-04-16 PROCEDURE — 85610 PROTHROMBIN TIME: CPT

## 2023-04-16 PROCEDURE — 36415 COLL VENOUS BLD VENIPUNCTURE: CPT

## 2023-04-16 PROCEDURE — 84300 ASSAY OF URINE SODIUM: CPT

## 2023-04-16 PROCEDURE — 83880 ASSAY OF NATRIURETIC PEPTIDE: CPT

## 2023-04-16 PROCEDURE — 80053 COMPREHEN METABOLIC PANEL: CPT

## 2023-04-16 RX ORDER — VALSARTAN 160 MG/1
160 TABLET ORAL 2 TIMES DAILY
Status: DISCONTINUED | OUTPATIENT
Start: 2023-04-16 | End: 2023-04-18 | Stop reason: HOSPADM

## 2023-04-16 RX ORDER — SODIUM CHLORIDE 9 MG/ML
INJECTION, SOLUTION INTRAVENOUS CONTINUOUS
Status: DISCONTINUED | OUTPATIENT
Start: 2023-04-16 | End: 2023-04-18 | Stop reason: HOSPADM

## 2023-04-16 RX ORDER — SODIUM CHLORIDE 0.9 % (FLUSH) 0.9 %
5-40 SYRINGE (ML) INJECTION EVERY 12 HOURS SCHEDULED
Status: DISCONTINUED | OUTPATIENT
Start: 2023-04-16 | End: 2023-04-18 | Stop reason: HOSPADM

## 2023-04-16 RX ORDER — POLYETHYLENE GLYCOL 3350 17 G/17G
17 POWDER, FOR SOLUTION ORAL DAILY PRN
Status: DISCONTINUED | OUTPATIENT
Start: 2023-04-16 | End: 2023-04-18 | Stop reason: HOSPADM

## 2023-04-16 RX ORDER — ONDANSETRON 4 MG/1
4 TABLET, ORALLY DISINTEGRATING ORAL EVERY 8 HOURS PRN
Status: DISCONTINUED | OUTPATIENT
Start: 2023-04-16 | End: 2023-04-18 | Stop reason: HOSPADM

## 2023-04-16 RX ORDER — HYDROCHLOROTHIAZIDE 25 MG/1
12.5 TABLET ORAL 2 TIMES DAILY
Status: DISCONTINUED | OUTPATIENT
Start: 2023-04-16 | End: 2023-04-18 | Stop reason: HOSPADM

## 2023-04-16 RX ORDER — CLOPIDOGREL BISULFATE 75 MG/1
75 TABLET ORAL DAILY
Status: DISCONTINUED | OUTPATIENT
Start: 2023-04-16 | End: 2023-04-18 | Stop reason: HOSPADM

## 2023-04-16 RX ORDER — HEPARIN SODIUM 1000 [USP'U]/ML
4000 INJECTION, SOLUTION INTRAVENOUS; SUBCUTANEOUS ONCE
Status: COMPLETED | OUTPATIENT
Start: 2023-04-16 | End: 2023-04-16

## 2023-04-16 RX ORDER — VALSARTAN AND HYDROCHLOROTHIAZIDE 320; 25 MG/1; MG/1
0.5 TABLET, FILM COATED ORAL 2 TIMES DAILY
Status: DISCONTINUED | OUTPATIENT
Start: 2023-04-16 | End: 2023-04-16 | Stop reason: CLARIF

## 2023-04-16 RX ORDER — 0.9 % SODIUM CHLORIDE 0.9 %
1000 INTRAVENOUS SOLUTION INTRAVENOUS ONCE
Status: COMPLETED | OUTPATIENT
Start: 2023-04-16 | End: 2023-04-16

## 2023-04-16 RX ORDER — LEVOTHYROXINE SODIUM 0.1 MG/1
100 TABLET ORAL DAILY
Status: DISCONTINUED | OUTPATIENT
Start: 2023-04-17 | End: 2023-04-18 | Stop reason: HOSPADM

## 2023-04-16 RX ORDER — SODIUM CHLORIDE 0.9 % (FLUSH) 0.9 %
5-40 SYRINGE (ML) INJECTION PRN
Status: DISCONTINUED | OUTPATIENT
Start: 2023-04-16 | End: 2023-04-18 | Stop reason: HOSPADM

## 2023-04-16 RX ORDER — FENOFIBRATE 160 MG/1
160 TABLET ORAL DAILY
Status: DISCONTINUED | OUTPATIENT
Start: 2023-04-16 | End: 2023-04-18 | Stop reason: HOSPADM

## 2023-04-16 RX ORDER — SODIUM CHLORIDE 9 MG/ML
INJECTION, SOLUTION INTRAVENOUS PRN
Status: DISCONTINUED | OUTPATIENT
Start: 2023-04-16 | End: 2023-04-18 | Stop reason: HOSPADM

## 2023-04-16 RX ORDER — HEPARIN SODIUM 10000 [USP'U]/100ML
5-30 INJECTION, SOLUTION INTRAVENOUS CONTINUOUS
Status: DISCONTINUED | OUTPATIENT
Start: 2023-04-16 | End: 2023-04-17

## 2023-04-16 RX ORDER — ONDANSETRON 2 MG/ML
4 INJECTION INTRAMUSCULAR; INTRAVENOUS EVERY 6 HOURS PRN
Status: DISCONTINUED | OUTPATIENT
Start: 2023-04-16 | End: 2023-04-18 | Stop reason: HOSPADM

## 2023-04-16 RX ORDER — ACETAMINOPHEN 325 MG/1
650 TABLET ORAL EVERY 6 HOURS PRN
Status: DISCONTINUED | OUTPATIENT
Start: 2023-04-16 | End: 2023-04-18 | Stop reason: HOSPADM

## 2023-04-16 RX ORDER — ATENOLOL 25 MG/1
25 TABLET ORAL 2 TIMES DAILY
Status: DISCONTINUED | OUTPATIENT
Start: 2023-04-16 | End: 2023-04-18 | Stop reason: HOSPADM

## 2023-04-16 RX ORDER — HEPARIN SODIUM 1000 [USP'U]/ML
2000 INJECTION, SOLUTION INTRAVENOUS; SUBCUTANEOUS PRN
Status: DISCONTINUED | OUTPATIENT
Start: 2023-04-16 | End: 2023-04-17

## 2023-04-16 RX ORDER — HEPARIN SODIUM 1000 [USP'U]/ML
4000 INJECTION, SOLUTION INTRAVENOUS; SUBCUTANEOUS PRN
Status: DISCONTINUED | OUTPATIENT
Start: 2023-04-16 | End: 2023-04-17

## 2023-04-16 RX ORDER — ACETAMINOPHEN 650 MG/1
650 SUPPOSITORY RECTAL EVERY 6 HOURS PRN
Status: DISCONTINUED | OUTPATIENT
Start: 2023-04-16 | End: 2023-04-18 | Stop reason: HOSPADM

## 2023-04-16 RX ADMIN — CLOPIDOGREL BISULFATE 75 MG: 75 TABLET ORAL at 20:09

## 2023-04-16 RX ADMIN — FENOFIBRATE 160 MG: 160 TABLET ORAL at 20:09

## 2023-04-16 RX ADMIN — SODIUM CHLORIDE: 9 INJECTION, SOLUTION INTRAVENOUS at 18:44

## 2023-04-16 RX ADMIN — ACETAMINOPHEN 650 MG: 325 TABLET ORAL at 21:47

## 2023-04-16 RX ADMIN — HEPARIN SODIUM 4000 UNITS: 1000 INJECTION INTRAVENOUS; SUBCUTANEOUS at 20:03

## 2023-04-16 RX ADMIN — SODIUM CHLORIDE: 9 INJECTION, SOLUTION INTRAVENOUS at 23:13

## 2023-04-16 RX ADMIN — SODIUM CHLORIDE 1000 ML: 9 INJECTION, SOLUTION INTRAVENOUS at 14:41

## 2023-04-16 RX ADMIN — HEPARIN SODIUM 10 UNITS/KG/HR: 10000 INJECTION, SOLUTION INTRAVENOUS at 20:05

## 2023-04-16 ASSESSMENT — PAIN SCALES - GENERAL
PAINLEVEL_OUTOF10: 2
PAINLEVEL_OUTOF10: 0
PAINLEVEL_OUTOF10: 0

## 2023-04-16 ASSESSMENT — PAIN DESCRIPTION - LOCATION: LOCATION: GENERALIZED

## 2023-04-16 ASSESSMENT — PAIN - FUNCTIONAL ASSESSMENT: PAIN_FUNCTIONAL_ASSESSMENT: NONE - DENIES PAIN

## 2023-04-17 ENCOUNTER — APPOINTMENT (OUTPATIENT)
Dept: CARDIAC CATH/INVASIVE PROCEDURES | Age: 73
DRG: 303 | End: 2023-04-17
Payer: MEDICARE

## 2023-04-17 ENCOUNTER — APPOINTMENT (OUTPATIENT)
Dept: INTERVENTIONAL RADIOLOGY/VASCULAR | Age: 73
DRG: 303 | End: 2023-04-17
Payer: MEDICARE

## 2023-04-17 LAB
ACTIVATED CLOTTING TIME: 287 SECONDS (ref 1–150)
ANION GAP SERPL CALC-SCNC: 12 MEQ/L (ref 8–16)
APTT PPP: 50.2 SECONDS (ref 22–38)
APTT PPP: 56.7 SECONDS (ref 22–38)
APTT PPP: 93.1 SECONDS (ref 22–38)
BUN SERPL-MCNC: 22 MG/DL (ref 7–22)
CALCIUM SERPL-MCNC: 9.1 MG/DL (ref 8.5–10.5)
CHLORIDE SERPL-SCNC: 102 MEQ/L (ref 98–111)
CHOLEST SERPL-MCNC: 187 MG/DL (ref 100–199)
CO2 SERPL-SCNC: 25 MEQ/L (ref 23–33)
CREAT SERPL-MCNC: 1.3 MG/DL (ref 0.4–1.2)
DEPRECATED MEAN GLUCOSE BLD GHB EST-ACNC: 117 MG/DL (ref 70–126)
DEPRECATED RDW RBC AUTO: 42.9 FL (ref 35–45)
EKG ATRIAL RATE: 59 BPM
EKG ATRIAL RATE: 63 BPM
EKG P AXIS: 35 DEGREES
EKG P AXIS: 44 DEGREES
EKG P-R INTERVAL: 152 MS
EKG P-R INTERVAL: 168 MS
EKG Q-T INTERVAL: 416 MS
EKG Q-T INTERVAL: 438 MS
EKG QRS DURATION: 84 MS
EKG QRS DURATION: 86 MS
EKG QTC CALCULATION (BAZETT): 425 MS
EKG QTC CALCULATION (BAZETT): 433 MS
EKG R AXIS: -12 DEGREES
EKG R AXIS: 23 DEGREES
EKG T AXIS: 39 DEGREES
EKG T AXIS: 8 DEGREES
EKG VENTRICULAR RATE: 59 BPM
EKG VENTRICULAR RATE: 63 BPM
ERYTHROCYTE [DISTWIDTH] IN BLOOD BY AUTOMATED COUNT: 13.1 % (ref 11.5–14.5)
GFR SERPL CREATININE-BSD FRML MDRD: 58 ML/MIN/1.73M2
GLUCOSE SERPL-MCNC: 122 MG/DL (ref 70–108)
HBA1C MFR BLD HPLC: 5.9 % (ref 4.4–6.4)
HCT VFR BLD AUTO: 43.5 % (ref 42–52)
HDLC SERPL-MCNC: 39 MG/DL
HGB BLD-MCNC: 14.5 GM/DL (ref 14–18)
LDLC SERPL CALC-MCNC: 96 MG/DL
LV EF: 53 %
LVEF MODALITY: NORMAL
MAGNESIUM SERPL-MCNC: 2.1 MG/DL (ref 1.6–2.4)
MCH RBC QN AUTO: 30 PG (ref 26–33)
MCHC RBC AUTO-ENTMCNC: 33.3 GM/DL (ref 32.2–35.5)
MCV RBC AUTO: 90.1 FL (ref 80–94)
PLATELET # BLD AUTO: 173 THOU/MM3 (ref 130–400)
PMV BLD AUTO: 11.3 FL (ref 9.4–12.4)
POTASSIUM SERPL-SCNC: 3.9 MEQ/L (ref 3.5–5.2)
RBC # BLD AUTO: 4.83 MILL/MM3 (ref 4.7–6.1)
SODIUM SERPL-SCNC: 139 MEQ/L (ref 135–145)
TRIGL SERPL-MCNC: 259 MG/DL (ref 0–199)
WBC # BLD AUTO: 7.7 THOU/MM3 (ref 4.8–10.8)

## 2023-04-17 PROCEDURE — 93005 ELECTROCARDIOGRAM TRACING: CPT

## 2023-04-17 PROCEDURE — 93306 TTE W/DOPPLER COMPLETE: CPT

## 2023-04-17 PROCEDURE — C1769 GUIDE WIRE: HCPCS

## 2023-04-17 PROCEDURE — 6360000002 HC RX W HCPCS

## 2023-04-17 PROCEDURE — 1200000003 HC TELEMETRY R&B

## 2023-04-17 PROCEDURE — 93880 EXTRACRANIAL BILAT STUDY: CPT

## 2023-04-17 PROCEDURE — 92978 ENDOLUMINL IVUS OCT C 1ST: CPT

## 2023-04-17 PROCEDURE — 6370000000 HC RX 637 (ALT 250 FOR IP)

## 2023-04-17 PROCEDURE — 85027 COMPLETE CBC AUTOMATED: CPT

## 2023-04-17 PROCEDURE — 6360000004 HC RX CONTRAST MEDICATION: Performed by: INTERNAL MEDICINE

## 2023-04-17 PROCEDURE — C1725 CATH, TRANSLUMIN NON-LASER: HCPCS

## 2023-04-17 PROCEDURE — 83036 HEMOGLOBIN GLYCOSYLATED A1C: CPT

## 2023-04-17 PROCEDURE — 2500000003 HC RX 250 WO HCPCS

## 2023-04-17 PROCEDURE — 83735 ASSAY OF MAGNESIUM: CPT

## 2023-04-17 PROCEDURE — 99233 SBSQ HOSP IP/OBS HIGH 50: CPT | Performed by: STUDENT IN AN ORGANIZED HEALTH CARE EDUCATION/TRAINING PROGRAM

## 2023-04-17 PROCEDURE — C1894 INTRO/SHEATH, NON-LASER: HCPCS

## 2023-04-17 PROCEDURE — 80061 LIPID PANEL: CPT

## 2023-04-17 PROCEDURE — 93571 IV DOP VEL&/PRESS C FLO 1ST: CPT

## 2023-04-17 PROCEDURE — 99223 1ST HOSP IP/OBS HIGH 75: CPT | Performed by: INTERNAL MEDICINE

## 2023-04-17 PROCEDURE — 85347 COAGULATION TIME ACTIVATED: CPT

## 2023-04-17 PROCEDURE — 85730 THROMBOPLASTIN TIME PARTIAL: CPT

## 2023-04-17 PROCEDURE — 2580000003 HC RX 258

## 2023-04-17 PROCEDURE — 93458 L HRT ARTERY/VENTRICLE ANGIO: CPT

## 2023-04-17 PROCEDURE — 80048 BASIC METABOLIC PNL TOTAL CA: CPT

## 2023-04-17 PROCEDURE — 93010 ELECTROCARDIOGRAM REPORT: CPT | Performed by: INTERNAL MEDICINE

## 2023-04-17 PROCEDURE — 36415 COLL VENOUS BLD VENIPUNCTURE: CPT

## 2023-04-17 PROCEDURE — C1874 STENT, COATED/COV W/DEL SYS: HCPCS

## 2023-04-17 PROCEDURE — C1887 CATHETER, GUIDING: HCPCS

## 2023-04-17 PROCEDURE — C1753 CATH, INTRAVAS ULTRASOUND: HCPCS

## 2023-04-17 PROCEDURE — C9600 PERC DRUG-EL COR STENT SING: HCPCS

## 2023-04-17 RX ORDER — ACETAMINOPHEN 325 MG/1
650 TABLET ORAL EVERY 4 HOURS PRN
OUTPATIENT
Start: 2023-04-17

## 2023-04-17 RX ORDER — SODIUM CHLORIDE 9 MG/ML
INJECTION, SOLUTION INTRAVENOUS PRN
OUTPATIENT
Start: 2023-04-17

## 2023-04-17 RX ORDER — SODIUM CHLORIDE 0.9 % (FLUSH) 0.9 %
5-40 SYRINGE (ML) INJECTION PRN
OUTPATIENT
Start: 2023-04-17

## 2023-04-17 RX ORDER — SODIUM CHLORIDE 0.9 % (FLUSH) 0.9 %
5-40 SYRINGE (ML) INJECTION EVERY 12 HOURS SCHEDULED
OUTPATIENT
Start: 2023-04-17

## 2023-04-17 RX ORDER — SODIUM CHLORIDE 9 MG/ML
INJECTION, SOLUTION INTRAVENOUS CONTINUOUS
OUTPATIENT
Start: 2023-04-17

## 2023-04-17 RX ADMIN — SODIUM CHLORIDE: 9 INJECTION, SOLUTION INTRAVENOUS at 18:30

## 2023-04-17 RX ADMIN — HEPARIN SODIUM 12 UNITS/KG/HR: 10000 INJECTION, SOLUTION INTRAVENOUS at 01:41

## 2023-04-17 RX ADMIN — SODIUM CHLORIDE, PRESERVATIVE FREE 10 ML: 5 INJECTION INTRAVENOUS at 08:26

## 2023-04-17 RX ADMIN — CLOPIDOGREL BISULFATE 75 MG: 75 TABLET ORAL at 08:23

## 2023-04-17 RX ADMIN — IOPAMIDOL 120 ML: 755 INJECTION, SOLUTION INTRAVENOUS at 17:12

## 2023-04-17 RX ADMIN — FENOFIBRATE 160 MG: 160 TABLET ORAL at 08:24

## 2023-04-17 NOTE — BRIEF OP NOTE
[facility]  Sedation/Analgesia Post Sedation Record        Pt Name: Frankie Dahl  MRN: [id number]  YOB: 1950  Procedure Performed By: Eleanor Brown MD MD, [address]  Primary Care Physician: BASHIR Wheeler CNP    POST-PROCEDURE                                  Sedation/Anesthesia:  Local Anesthesia and IV Conscious Sedation with continuous O2 monitoring    Estimated Blood Loss: 10 cc     Specimens Removed:  [x]None []Other:      Disposition of Specimen:  []Pathology []Other        Complications:   [x]None Immediate []Other:         Procedure Performed:  Left heart cath and PCI to LAD  FFR of LAD was 0.70  IVUS was used for stent optimization    Post Procedure Diagnosis/Findings:  Coronary Artery Disease          Recommendations:   Transfer to Tele unit  DAPT   Lipid lowering therapy  Aggressive risk factor modification  Cardiac rehab  Monitor access site closely for bleeding  IV Fluids    All questions and concerns were addressed and patient is in agreement with plan.                  Eleanor Brown MD MD, [address]  Electronically signed 4/17/2023 at 5:23 PM

## 2023-04-17 NOTE — CONSULTS
Mary Ann Perez MD   10 mL at 04/17/23 0826    sodium chloride flush 0.9 % injection 5-40 mL  5-40 mL IntraVENous PRN Jeannie Benedict MD        0.9 % sodium chloride infusion   IntraVENous PRN Jeannie Benedict MD        ondansetron (ZOFRAN-ODT) disintegrating tablet 4 mg  4 mg Oral Q8H PRN Jeannie Benedict MD        Or    ondansetron [facility]) injection 4 mg  4 mg IntraVENous Q6H PRN Jeannie Benedict MD        acetaminophen (TYLENOL) tablet 650 mg  650 mg Oral Q6H PRN Jeannie Benedict MD   650 mg at 04/16/23 2147    Or    acetaminophen (TYLENOL) suppository 650 mg  650 mg Rectal Q6H PRN Jeannie Benedict MD        polyethylene glycol [facility]) packet 17 g  17 g Oral Daily PRN Jeannie Benedict MD        heparin (porcine) injection 4,000 Units  4,000 Units IntraVENous PRN Jeannie Benedict MD        heparin (porcine) injection 2,000 Units  2,000 Units IntraVENous PRN Jeannie Benedict MD        heparin 25,000 units in dextrose 5% 250 mL (premix) infusion  5-30 Units/kg/hr IntraVENous Continuous Jeannie Benedict MD 13.1 mL/hr at 04/17/23 0825 14 Units/kg/hr at 04/17/23 0825    fenofibrate (TRIGLIDE) tablet 160 mg  160 mg Oral Daily Jeannie Benedict MD   160 mg at 04/17/23 0824    0.9 % sodium chloride infusion   IntraVENous Continuous Jeannie Benedict MD 50 mL/hr at 04/17/23 0314 Rate Verify at 04/17/23 0314    [Held by provider] valsartan (DIOVAN) tablet 160 mg  160 mg Oral BID Jeannie Benedict MD        And    [facility] hydroCHLOROthiazide (HYDRODIURIL) tablet 12.5 mg  12.5 mg Oral BID Jeannie Benedict MD         Prior to Admission medications    Medication Sig Start Date End Date Taking?  Authorizing Provider   valsartan-hydroCHLOROthiazide (DIOVAN-HCT) 320-25 MG per tablet Take 0.5 tablets by mouth in the morning and at bedtime 3/14/23   BASHIR Michelle - CNP   Alirocumab (PRALUENT) 150 MG/ML SOAJ Inject 1 mL into the skin every 30 days 2/17/23   BASHIR Michelle - CNP   clopidogrel (PLAVIX) 75 MG tablet Take 1 tablet by mouth once daily 1/10/23

## 2023-04-18 VITALS
WEIGHT: 217.2 LBS | DIASTOLIC BLOOD PRESSURE: 79 MMHG | HEIGHT: 69 IN | OXYGEN SATURATION: 99 % | RESPIRATION RATE: 18 BRPM | BODY MASS INDEX: 32.17 KG/M2 | TEMPERATURE: 97.5 F | SYSTOLIC BLOOD PRESSURE: 160 MMHG | HEART RATE: 66 BPM

## 2023-04-18 LAB
ANION GAP SERPL CALC-SCNC: 7 MEQ/L (ref 8–16)
BUN SERPL-MCNC: 18 MG/DL (ref 7–22)
CALCIUM SERPL-MCNC: 8.6 MG/DL (ref 8.5–10.5)
CHLORIDE SERPL-SCNC: 104 MEQ/L (ref 98–111)
CO2 SERPL-SCNC: 27 MEQ/L (ref 23–33)
CREAT SERPL-MCNC: 1.3 MG/DL (ref 0.4–1.2)
DEPRECATED RDW RBC AUTO: 44.9 FL (ref 35–45)
EKG ATRIAL RATE: 66 BPM
EKG P AXIS: 65 DEGREES
EKG P-R INTERVAL: 192 MS
EKG Q-T INTERVAL: 426 MS
EKG QRS DURATION: 88 MS
EKG QTC CALCULATION (BAZETT): 446 MS
EKG T AXIS: 29 DEGREES
EKG VENTRICULAR RATE: 66 BPM
ERYTHROCYTE [DISTWIDTH] IN BLOOD BY AUTOMATED COUNT: 13.2 % (ref 11.5–14.5)
GFR SERPL CREATININE-BSD FRML MDRD: 58 ML/MIN/1.73M2
GLUCOSE SERPL-MCNC: 105 MG/DL (ref 70–108)
HCT VFR BLD AUTO: 42.4 % (ref 42–52)
HGB BLD-MCNC: 13.8 GM/DL (ref 14–18)
MAGNESIUM SERPL-MCNC: 2.2 MG/DL (ref 1.6–2.4)
MCH RBC QN AUTO: 30 PG (ref 26–33)
MCHC RBC AUTO-ENTMCNC: 32.5 GM/DL (ref 32.2–35.5)
MCV RBC AUTO: 92.2 FL (ref 80–94)
PLATELET # BLD AUTO: 158 THOU/MM3 (ref 130–400)
PMV BLD AUTO: 10.9 FL (ref 9.4–12.4)
POTASSIUM SERPL-SCNC: 4.2 MEQ/L (ref 3.5–5.2)
RBC # BLD AUTO: 4.6 MILL/MM3 (ref 4.7–6.1)
SODIUM SERPL-SCNC: 138 MEQ/L (ref 135–145)
WBC # BLD AUTO: 6.7 THOU/MM3 (ref 4.8–10.8)

## 2023-04-18 PROCEDURE — 36415 COLL VENOUS BLD VENIPUNCTURE: CPT

## 2023-04-18 PROCEDURE — B240ZZ3 ULTRASONOGRAPHY OF SINGLE CORONARY ARTERY, INTRAVASCULAR: ICD-10-PCS | Performed by: INTERNAL MEDICINE

## 2023-04-18 PROCEDURE — 93010 ELECTROCARDIOGRAM REPORT: CPT | Performed by: INTERNAL MEDICINE

## 2023-04-18 PROCEDURE — 6370000000 HC RX 637 (ALT 250 FOR IP)

## 2023-04-18 PROCEDURE — 99232 SBSQ HOSP IP/OBS MODERATE 35: CPT | Performed by: NURSE PRACTITIONER

## 2023-04-18 PROCEDURE — 85027 COMPLETE CBC AUTOMATED: CPT

## 2023-04-18 PROCEDURE — 4A033BC MEASUREMENT OF ARTERIAL PRESSURE, CORONARY, PERCUTANEOUS APPROACH: ICD-10-PCS | Performed by: INTERNAL MEDICINE

## 2023-04-18 PROCEDURE — 83735 ASSAY OF MAGNESIUM: CPT

## 2023-04-18 PROCEDURE — 027036Z DILATION OF CORONARY ARTERY, ONE ARTERY WITH THREE DRUG-ELUTING INTRALUMINAL DEVICES, PERCUTANEOUS APPROACH: ICD-10-PCS | Performed by: INTERNAL MEDICINE

## 2023-04-18 PROCEDURE — 99239 HOSP IP/OBS DSCHRG MGMT >30: CPT | Performed by: STUDENT IN AN ORGANIZED HEALTH CARE EDUCATION/TRAINING PROGRAM

## 2023-04-18 PROCEDURE — 4A023N7 MEASUREMENT OF CARDIAC SAMPLING AND PRESSURE, LEFT HEART, PERCUTANEOUS APPROACH: ICD-10-PCS | Performed by: INTERNAL MEDICINE

## 2023-04-18 PROCEDURE — 80048 BASIC METABOLIC PNL TOTAL CA: CPT

## 2023-04-18 PROCEDURE — B2111ZZ FLUOROSCOPY OF MULTIPLE CORONARY ARTERIES USING LOW OSMOLAR CONTRAST: ICD-10-PCS | Performed by: INTERNAL MEDICINE

## 2023-04-18 PROCEDURE — 93270 REMOTE 30 DAY ECG REV/REPORT: CPT

## 2023-04-18 RX ORDER — VALSARTAN 160 MG/1
160 TABLET ORAL 2 TIMES DAILY
Qty: 60 TABLET | Refills: 3 | Status: SHIPPED | OUTPATIENT
Start: 2023-04-18

## 2023-04-18 RX ORDER — ASPIRIN 81 MG/1
81 TABLET, CHEWABLE ORAL DAILY
Qty: 14 TABLET | Refills: 0 | COMMUNITY
Start: 2023-04-18 | End: 2023-05-02

## 2023-04-18 RX ADMIN — FENOFIBRATE 160 MG: 160 TABLET ORAL at 09:09

## 2023-04-18 RX ADMIN — LEVOTHYROXINE SODIUM 100 MCG: 0.1 TABLET ORAL at 05:44

## 2023-04-18 RX ADMIN — CLOPIDOGREL BISULFATE 75 MG: 75 TABLET ORAL at 09:09

## 2023-04-18 NOTE — PROGRESS NOTES
CLINICAL PHARMACY: DISCHARGE MED RECONCILIATION/REVIEW    [facility]) Select Patient?: No  Total # of Interventions Recommended: 0   -   Total # Interventions Accepted: 0  Intervention Severity:   - Level 1 Intervention Present?: No   - Level 2 #: 0   - Level 3 #: 0   Time Spent (min): 15    Additional Documentation:    Amy Spivey PharmD 4/18/2023 12:10 PM
Call placed to EKG for event monitor placement for patient's discharge, per [facility] tech they will get to \"it as fast as they can\".
Discharge teaching and instructions for diagnosis/procedure of Syncope and cardiac monitor event completed with patient using teachback method. AVS reviewed. Patient voiced understanding regarding prescriptions, follow up appointments, and care of self at home.  Discharged in a wheelchair to  home with support per family
EKG notified of orders for Event monitor for patient at discharge. Stated will be up in about an hour.
Echo done bedside Dr. Elia العلي to read
Inpatient Cardiac Rehabilitation Consult    Received consult for Phase II Cardiac Rehabilitation. Patient needs cardiac rehab due to PCI on 4/18/23. Importance of Cardiac Rehab discussed with patient. Reviewed cardiac rehab class times. Patient questions answered. We will contact patient at home to schedule evaluation appointment. Cardiac Rehab brochure given.
Internal Medicine Resident Progress Note    Patient:  Jaime Jones    YOB: 1950  Unit/Bed:4A-04/004-A  MRN: [id number]    Acct: [de-identified]   PCP: BASHIR Estrada CNP    Date of Admission: 4/16/2023      Assessment/Plan:  #Unstable angina s/p PCI to LAD on 4/17: Presented with 1 day history of intermittent substernal chest pain at rest, radiating to left arm, associated with diaphoresis and lightheadedness. Not associated with shortness of breath. Chest pain was relieved with nitro. Patient does have history of stents placed in LAD and RCA with evidence of in-stent restenosis on recent cardiac cath in 2021. Troponins negative x3. Initial EKG showing sinus bradycardia, no evidence of ischemic changes. Repeat EKG showing sinus rhythm. Underwent left heart cath on 4/17, PCI to LAD, FFR of LAD was 0.7. Continue Plavix 75 mg daily. Hold home Eliquis. Cardiology consulted, appreciate recommendations     #Presyncopal episode with bradycardia: Likely secondary to distributive versus vasovagal.  Patient reports lightheadedness with associated diaphoresis. Per patient's wife, negative admission, patient was at [employer] and was noted to start following forward and was fell over the [employer] pew. Patient denies memory of incident. Denies any other recent falls. Most recent echo 2/8/2021 EF 55%, overall normal LV. Orthostatics negative, however patient had received 1 L of fluids in the ED. Carotid US moderate atherosclerosis in the right carotid bulb, 50 to 69% stenosis of the right internal carotid artery. Mild atherosclerosis in the left carotid bulb, less than 50% stenosis of the left internal carotid artery. TTE 4/17 EF 50 to 55%, no WMA. Cardiac event monitor on discharge  Cardiology consulted, appreciate recommendations. #Acute kidney injury: Creatinine 1.3 on presentation. Baseline creatinine approximately 0.9-1.1. Likely 2/2 poor oral intake.   Patient reports
Pt is a 73y. o. male, sitting in easychair awaiting discharge paperwork, in 4A-04; his wife is also in the room. Wayne Cancino shared that he's been having issues for several years now and dizzyness brought him to the hospital. He is softspoken, in a good mood and confident in his current health state. His wife, a former nurse, is keeping a close eye on him, his care and his prescriptions prior to leaving the hospital. They shared being involved in a [employer] on the [address] [place] [address] for many years, and enjoy it.  provided active listening, encouragement, nurtured hope and prayer-met with gratitude by Wayne Cancino and his wife. 04/18/23 1443   Encounter Summary   Encounter Overview/Reason  Initial Encounter   Service Provided For: Patient and family together   Referral/Consult From: [facility]   Support System Spouse; Family members   Last Encounter  04/18/23   Complexity of Encounter Low   Begin Time 1235   End Time  1247   Total Time Calculated 12 min   Spiritual/Emotional needs   Type Spiritual Support   Assessment/Intervention/Outcome   Assessment Coping;Calm;Peaceful; Hopeful   Intervention Active listening;Prayer (assurance of)/[place];Nurtured Hope;Explored/Affirmed feelings, thoughts, concerns; Discussed illness injury and its impact   Outcome Engaged in conversation;Expressed feelings, needs, and concerns;Expressed Gratitude;Receptive
This virtual RN completed discharge teaching with patient. Patient education provided on medications, follow ups, post heart cath care, and signs to call the provider or return to the ED. Patient verbalized understanding.
VM left for Cardiology to give patient a call to schedule new patient appt. MRN,  and patient telephone left on VM.
Classification:   []1 []2 [x]3 []4          MEDICAL HISTORY   has a past medical history of Acquired hypothyroidism, CAD (coronary artery disease), Cancer ([facility] DDD (degenerative disc disease), cervical, GERD (gastroesophageal reflux disease), Hyperlipidemia, and Hypertension. []Additional information:          SURGICAL HISTORY   has a past surgical history that includes transthoracic echocardiogram (02/25/2010); cardiovascular stress test (02/25/2010); cardiovascular stress test (11/17/2006); cardiovascular stress test (08/18/2005); cardiovascular stress test (07/27/2004); cardiovascular stress test (03/18/2003); Cardiac catheterization (08/20/2004); Percutaneous Transluminal Coronary Angio (07/19/2006); Cardiac catheterization (03/01/2003); and Diagnostic Cardiac Cath Lab Procedure (08/18/2000).   Additional information:       ALLERGIES   Allergies as of 04/16/2023 - Fully Reviewed 04/16/2023   Allergen Reaction Noted    Crestor [rosuvastatin]      Tape [employer] tape] Rash 10/10/2013     Additional information:       MEDICATIONS     Current Facility-Administered Medications:     [Held by provider] atenolol (TENORMIN) tablet 25 mg, 25 mg, Oral, BID, Adela Granados MD    clopidogrel (PLAVIX) tablet 75 mg, 75 mg, Oral, Daily, Adela Granados MD, 75 mg at 04/17/23 7540    levothyroxine (SYNTHROID) tablet 100 mcg, 100 mcg, Oral, Daily, Adela Granados MD    sodium chloride flush 0.9 % injection 5-40 mL, 5-40 mL, IntraVENous, 2 times per day, Adela Granados MD, 10 mL at 04/17/23 0826    sodium chloride flush 0.9 % injection 5-40 mL, 5-40 mL, IntraVENous, PRN, Adela Granados MD    0.9 % sodium chloride infusion, , IntraVENous, PRN, Adela Granados MD    ondansetron (ZOFRAN-ODT) disintegrating tablet 4 mg, 4 mg, Oral, Q8H PRN **OR** ondansetron (ZOFRAN) injection 4 mg, 4 mg, IntraVENous, Q6H PRN, Adela Granados MD    acetaminophen (TYLENOL) tablet 650 mg, 650 mg, Oral, Q6H PRN, 650 mg at 04/16/23 2149 **OR** acetaminophen
03:24 AM    RBC 4.60 04/18/2023 03:24 AM    RBC 5.33 05/14/2022 08:15 AM    HGB 13.8 04/18/2023 03:24 AM    HCT 42.4 04/18/2023 03:24 AM     04/18/2023 03:24 AM       CMP:    Lab Results   Component Value Date/Time     04/18/2023 03:24 AM    K 4.2 04/18/2023 03:24 AM    K 4.0 04/16/2023 11:50 AM     04/18/2023 03:24 AM    CO2 27 04/18/2023 03:24 AM    BUN 18 04/18/2023 03:24 AM    CREATININE 1.3 04/18/2023 03:24 AM    LABGLOM 58 04/18/2023 03:24 AM    GLUCOSE 105 04/18/2023 03:24 AM    GLUCOSE 124 05/14/2022 08:15 AM    CALCIUM 8.6 04/18/2023 03:24 AM       Hepatic Function Panel:    Lab Results   Component Value Date/Time    ALKPHOS 33 04/16/2023 11:50 AM    ALT 18 04/16/2023 11:50 AM    AST 22 04/16/2023 11:50 AM    PROT 7.4 04/16/2023 11:50 AM    PROT 6.8 08/27/2013 12:00 AM    BILITOT 0.6 04/16/2023 11:50 AM    BILIDIR <0.2 12/02/2020 12:34 PM    LABALBU 4.7 04/16/2023 11:50 AM    LABALBU 4.5 10/13/2011 08:15 AM       Magnesium:    Lab Results   Component Value Date/Time    MG 2.2 04/18/2023 03:24 AM       PT/INR:    Lab Results   Component Value Date/Time    INR 1.31 04/16/2023 06:25 PM       HgBA1c:    Lab Results   Component Value Date/Time    LABA1C 5.9 04/17/2023 07:23 AM       FLP:    Lab Results   Component Value Date/Time    TRIG 259 04/17/2023 07:23 AM    HDL 39 04/17/2023 07:23 AM    LDLCALC 96 04/17/2023 07:23 AM    LDLDIRECT 105 05/14/2022 08:15 AM    LABVLDL 79 11/26/2022 08:10 AM       TSH:    Lab Results   Component Value Date/Time    TSH 1.930 04/16/2023 06:25 PM         Assessment:  [place]    Presyncope- likely VV    Sp cardiac cath 4/17/2023  Left heart cath and PCI to LAD  FFR of LAD was 0.70  IVUS was used for stent optimization  - preserved EF     CKD stage 3   HTN stable   HLP LDL 96 - on praluent     Hx PAFB - on eliquis   Prior CAD with PCI (RCA 2021 / ISR LAD 2017)      Plan:  Luke Raw for DC today from cardiology   Continuous 2 weeks for [facility]  Follow with Dr. Saira Gibbons.. 2

## 2023-04-18 NOTE — CARE COORDINATION
4/18/23, 11:41 AM EDT    Patient goals/plan/ treatment preferences discussed by  and . Patient goals/plan/ treatment preferences reviewed with patient/ family. Patient/ family verbalize understanding of discharge plan and are in agreement with goal/plan/treatment preferences. Understanding was demonstrated using the teach back method. AVS provided by RN at time of discharge, which includes all necessary medical information pertaining to the patients current course of illness, treatment, post-discharge goals of care, and treatment preferences.      Services At/After Discharge: None       IMM Letter  IMM Letter given to Patient/Family/Significant other/Guardian/POA/by[de-identified] staff  IMM Letter date given[de-identified] 04/16/23  IMM Letter time given[de-identified] 1931
expects to discharge to: [address] [place] [address] for transportation at discharge:      Financial    Payor: [address] [place] [address],[address] / Plan: MEDICARE PART A AND B / Product Type: *No Product type* /     Does insurance require precert for SNF: No    Potential assistance Purchasing Medications: No  Meds-to-Beds request: Yes      [address] - [place], [address] [phone]  [address]  [address]  Phone: [phone] Fax: [phone] [facility] [phone] - [place], [place] - [address] [place] [address] Road [phone]  [phone] Marie y  [address] [facility] [address]  Phone: [phone] Fax: [phone]    [facility]  [address] [phone] Jaylin Putty [phone]  [address] [place] [address]  Phone: [phone] Fax: [phone]      Notes:    Factors facilitating achievement of predicted outcomes: Family support    Barriers to discharge: Medical complications    Additional Case Management Notes: From ED. Creatinine 1.3, telemetry, Cardiology consult, IV fluids, Plavix, Triglide, Heparin drip. Procedure:   4/16 CXR Stable radiographic appearance of the chest. No evidence of an acute process. 4/16 CT Head WO Contrast 1. No acute intracranial hemorrhage, mass effect or midline shift. 2. Paranasal sinus disease. 4/16 ECHO    The Plan for Transition of Care is related to the following treatment goals of Syncope and collapse [R55]    Patient Goals/Plan/Treatment Preferences: Met with Hillary Guerra. He currently lives at home with his spouse. He is independent. Planis to return home at discharge. He denies need for DME and declines HH. Will follow. Transportation/Food Security/Housekeeping Addressed: No issues identified.      Tanisha Ac RN  Case Management Department

## 2023-04-18 NOTE — PLAN OF CARE
Problem: Discharge Planning  Goal: Discharge to home or other facility with appropriate resources  4/16/2023 2244 by Chichi Cheek RN  Outcome: Progressing  Flowsheets (Taken 4/16/2023 2244)  Discharge to home or other facility with appropriate resources: Identify barriers to discharge with patient and caregiver     Problem: Skin/Tissue Integrity  Goal: Absence of new skin breakdown  Description: 1. Monitor for areas of redness and/or skin breakdown  2. Assess vascular access sites hourly  3. Every 4-6 hours minimum:  Change oxygen saturation probe site  4. Every 4-6 hours:  If on nasal continuous positive airway pressure, respiratory therapy assess nares and determine need for appliance change or resting period. 4/16/2023 2244 by Chichi Cheek RN  Outcome: Progressing  Note: No signs of skin breakdown. Skin warm, dry, and intact. Mucous membranes pink and moist.  Assistance with turns/ambulation provided PRN. Will continue to monitor.        Problem: Neurosensory - Adult  Goal: Achieves maximal functionality and self care  4/16/2023 2244 by Chichi Cheek RN  Outcome: Progressing  Flowsheets (Taken 4/16/2023 2244)  Achieves maximal functionality and self care: Monitor swallowing and airway patency with patient fatigue and changes in neurological status     Problem: Cardiovascular - Adult  Goal: Maintains optimal cardiac output and hemodynamic stability  4/16/2023 2244 by Chichi Cheek RN  Outcome: Progressing  Maintains optimal cardiac output and hemodynamic stability:   Monitor blood pressure and heart rate   Assess for signs of decreased cardiac output     Problem: Skin/Tissue Integrity - Adult  Goal: Skin integrity remains intact  4/16/2023 2244 by Chichi Cheek RN  Outcome: Progressing  Flowsheets (Taken 4/16/2023 2244)  Skin Integrity Remains Intact: Monitor for areas of redness and/or skin breakdown     Problem: Infection - Adult  Goal: Absence of infection at discharge  4/16/2023 2244 by
Problem: Discharge Planning  Goal: Discharge to home or other facility with appropriate resources  Outcome: Progressing  Flowsheets (Taken 4/16/2023 2244 by Anika Rajput, STEPHANIE)  Discharge to home or other facility with appropriate resources: Identify barriers to discharge with patient and caregiver     Problem: Skin/Tissue Integrity  Goal: Absence of new skin breakdown  Description: 1. Monitor for areas of redness and/or skin breakdown  2. Assess vascular access sites hourly  3. Every 4-6 hours minimum:  Change oxygen saturation probe site  4. Every 4-6 hours:  If on nasal continuous positive airway pressure, respiratory therapy assess nares and determine need for appliance change or resting period.   Outcome: Progressing     Problem: ABCDS Injury Assessment  Goal: Absence of physical injury  Outcome: Progressing  Flowsheets (Taken 4/18/2023 0727)  Absence of Physical Injury: Implement safety measures based on patient assessment     Problem: Neurosensory - Adult  Goal: Achieves maximal functionality and self care  Outcome: Progressing  Flowsheets (Taken 4/16/2023 2244 by Anika Rajput RN)  Achieves maximal functionality and self care: Monitor swallowing and airway patency with patient fatigue and changes in neurological status     Problem: Cardiovascular - Adult  Goal: Maintains optimal cardiac output and hemodynamic stability  Outcome: Progressing  Flowsheets (Taken 4/16/2023 8078 by Miguelina Crouch RN)  Maintains optimal cardiac output and hemodynamic stability:   Monitor blood pressure and heart rate   Assess for signs of decreased cardiac output  Goal: Absence of cardiac dysrhythmias or at baseline  Outcome: Progressing  Flowsheets (Taken 4/18/2023 0727)  Absence of cardiac dysrhythmias or at baseline:   Monitor cardiac rate and rhythm   Assess for signs of decreased cardiac output     Problem: Skin/Tissue Integrity - Adult  Goal: Skin integrity remains intact  Outcome: Progressing  Flowsheets (Taken
Verbalizes/displays adequate comfort level or baseline comfort level  by Chichi Cheek RN  Outcome: Progressing  Verbalizes/displays adequate comfort level or baseline comfort level:   Encourage patient to monitor pain and request assistance   Assess pain using appropriate pain scale   Care plan reviewed with patient. Patient verbalizes understanding of the plan of care and contributed to goal setting.
Problem: Hematologic - Adult  Goal: Maintains hematologic stability  4/18/2023 1053 by Josue Mitchell RN  Outcome: Adequate for Discharge  4/18/2023 0727 by Josue Mitchell RN  Outcome: Progressing  Flowsheets (Taken 4/18/2023 0727)  Maintains hematologic stability: Assess for signs and symptoms of bleeding or hemorrhage     Problem: Chronic Conditions and Co-morbidities  Goal: Patient's chronic conditions and co-morbidity symptoms are monitored and maintained or improved  4/18/2023 1053 by Josue Mitchell RN  Outcome: Adequate for Discharge  4/18/2023 0727 by Josue Mitchell RN  Outcome: Progressing  Flowsheets (Taken 4/18/2023 0727)  Care Plan - Patient's Chronic Conditions and Co-Morbidity Symptoms are Monitored and Maintained or Improved: Monitor and assess patient's chronic conditions and comorbid symptoms for stability, deterioration, or improvement     Problem: Pain  Goal: Verbalizes/displays adequate comfort level or baseline comfort level  4/18/2023 1053 by Josue Mitchell RN  Outcome: Adequate for Discharge  4/18/2023 0727 by Josue Mitchell RN  Outcome: Progressing  Flowsheets (Taken 4/16/2023 2244 by Anika Rajput RN)  Verbalizes/displays adequate comfort level or baseline comfort level:   Encourage patient to monitor pain and request assistance   Assess pain using appropriate pain scale     Problem: Safety - Adult  Goal: Free from fall injury  4/18/2023 1053 by Josue Mitchell RN  Outcome: Adequate for Discharge  4/18/2023 0727 by Josue Mitchell RN  Outcome: Progressing  Flowsheets (Taken 4/18/2023 0727)  Free From Fall Injury: Instruct family/caregiver on patient safety

## 2023-04-18 NOTE — DISCHARGE SUMMARY
discharge. Follow-up with cardiology in 2 weeks. Discontinue atenolol  Patient provided with referral to neurology     #Acute kidney injury: Creatinine 1.3 on presentation. Baseline creatinine approximately 0.9-1.1. Likely 2/2 poor oral intake. Patient reports drinking maybe 1 bottle of water per day along with soda. Patient's new baseline may be around 1.3.     #Obstructive CAD s/p PCI with ARTHUR LAD, RCA: Patient has history of multiple stents placed with evidence of in-stent restenosis noted on recent cardiac cath in 2021. Patient stated he had issues with aspirin and was taken off by his PCP. On Eliquis and Plavix outpatient. Patient also has statin induced myalgias when he was taking Crestor. Continue Plavix, Praluent, fenofibrate     #History of atrial fibrillation with RVR: Patient has history of paroxysmal atrial fibrillation episodes of A-fib with RVR. [id number] 3. On Eliquis, atenolol outpatient. Currently in NSR. Monitor with telemetry. Atenolol discontinued in the setting of bradycardia. Follow-up with cardiology. #History of hypertension: Patient was normotensive during my evaluation in the ED. Holding home antihypertensive medications in the setting of lightheadedness, presyncopal episode. Patient is on atenolol 25 mg twice daily, valsartan-hydrochlorothiazide 160 mg - 12.5 mg twice daily. Atenolol in combination valsartan hydrochlorothiazide discontinued on discharge. Patient started on valsartan 160 mg twice daily on discharge. #Hyperlipidemia: Patient has history of statin induced myalgias. On Praluent, Vascepa, fenofibrate outpatient. Continue fenofibrate. Most recent lipid panel 4/17 total cholesterol 187, HDL 39, LDL 96, . Hemoglobin A1c 5.9% on 4/17. #GERD: Not on medication at home. #Hypothyroidism: TSH 1.93 this admission. Continue home Synthroid. #Former tobacco use: Noted.       Hospital Course:   Orlando Oconnor is a 68 y.o. male with PMHx of

## 2023-04-18 NOTE — PROCEDURES
The skin was prepped and a 14 day cardiac event monitor was applied. The patient was instructed on the documentation of symptoms and the purpose of the monitor as well as the things to avoid while wearing the monitor. The patient was instructed to remove and return the monitor on 05/02/2023.   The serial number of the monitor that was applied is [id number]

## 2023-04-19 ENCOUNTER — TELEPHONE (OUTPATIENT)
Dept: FAMILY MEDICINE CLINIC | Age: 73
End: 2023-04-19

## 2023-04-19 NOTE — TELEPHONE ENCOUNTER
Care Transitions Initial Follow Up Call    Outreach made within 2 business days of discharge: Yes    Patient: Salvador Hassan Patient : 1950   MRN: [id number]  Reason for Admission: There are no discharge diagnoses documented for the most recent discharge. Discharge Date: 23       Spoke with: Patient and wife Alice Roland    Discharge department/facility: [facility]    TCM Interactive Patient Contact:  Was patient able to fill all prescriptions: Yes  Was patient instructed to bring all medications to the follow-up visit: Yes  Is patient taking all medications as directed in the discharge summary?  Yes  Does patient understand their discharge instructions: Yes  Does patient have questions or concerns that need addressed prior to 7-14 day follow up office visit: no    Scheduled appointment with PCP within 7-14 days    Follow Up  Future Appointments   Date Time Provider Dillon Jacome   2023 11:00 AM Krunal Castillo, [address]   5/3/2023 11:30 AM Mic Maya PA-C [address] [facility]   2023  8:15 AM Clau Owen LPN

## 2023-04-20 ENCOUNTER — HOSPITAL ENCOUNTER (EMERGENCY)
Age: 73
Discharge: HOME | End: 2023-04-20
Attending: EMERGENCY MEDICINE
Payer: MEDICARE

## 2023-04-20 ENCOUNTER — APPOINTMENT (OUTPATIENT)
Dept: GENERAL RADIOLOGY | Age: 73
End: 2023-04-20
Payer: MEDICARE

## 2023-04-20 VITALS
SYSTOLIC BLOOD PRESSURE: 122 MMHG | DIASTOLIC BLOOD PRESSURE: 78 MMHG | OXYGEN SATURATION: 95 % | HEART RATE: 68 BPM | TEMPERATURE: 98.6 F | RESPIRATION RATE: 13 BRPM

## 2023-04-20 DIAGNOSIS — I48.91 ATRIAL FIBRILLATION WITH RVR (HCC): Primary | ICD-10-CM

## 2023-04-20 LAB
ALBUMIN SERPL BCG-MCNC: 4.3 G/DL (ref 3.5–5.1)
ALP SERPL-CCNC: 45 U/L (ref 38–126)
ALT SERPL W/O P-5'-P-CCNC: 22 U/L (ref 11–66)
ANION GAP SERPL CALC-SCNC: 11 MEQ/L (ref 8–16)
AST SERPL-CCNC: 33 U/L (ref 5–40)
BASOPHILS ABSOLUTE: 0 THOU/MM3 (ref 0–0.1)
BASOPHILS NFR BLD AUTO: 0.7 %
BILIRUB SERPL-MCNC: 0.5 MG/DL (ref 0.3–1.2)
BUN SERPL-MCNC: 14 MG/DL (ref 7–22)
CA-I BLD ISE-SCNC: 1.11 MMOL/L (ref 1.12–1.32)
CALCIUM SERPL-MCNC: 9.2 MG/DL (ref 8.5–10.5)
CHLORIDE SERPL-SCNC: 106 MEQ/L (ref 98–111)
CO2 SERPL-SCNC: 22 MEQ/L (ref 23–33)
CREAT SERPL-MCNC: 1.3 MG/DL (ref 0.4–1.2)
DEPRECATED RDW RBC AUTO: 43.3 FL (ref 35–45)
EOSINOPHIL NFR BLD AUTO: 3.2 %
EOSINOPHILS ABSOLUTE: 0.2 THOU/MM3 (ref 0–0.4)
ERYTHROCYTE [DISTWIDTH] IN BLOOD BY AUTOMATED COUNT: 13.2 % (ref 11.5–14.5)
GFR SERPL CREATININE-BSD FRML MDRD: 58 ML/MIN/1.73M2
GLUCOSE SERPL-MCNC: 158 MG/DL (ref 70–108)
HCT VFR BLD AUTO: 42.2 % (ref 42–52)
HGB BLD-MCNC: 13.9 GM/DL (ref 14–18)
IMM GRANULOCYTES # BLD AUTO: 0.02 THOU/MM3 (ref 0–0.07)
IMM GRANULOCYTES NFR BLD AUTO: 0.3 %
LYMPHOCYTES ABSOLUTE: 1.5 THOU/MM3 (ref 1–4.8)
LYMPHOCYTES NFR BLD AUTO: 21.2 %
MAGNESIUM SERPL-MCNC: 2.1 MG/DL (ref 1.6–2.4)
MCH RBC QN AUTO: 30.1 PG (ref 26–33)
MCHC RBC AUTO-ENTMCNC: 32.9 GM/DL (ref 32.2–35.5)
MCV RBC AUTO: 91.3 FL (ref 80–94)
MONOCYTES ABSOLUTE: 0.5 THOU/MM3 (ref 0.4–1.3)
MONOCYTES NFR BLD AUTO: 7.5 %
NEUTROPHILS NFR BLD AUTO: 67.1 %
NRBC BLD AUTO-RTO: 0 /100 WBC
NT-PROBNP SERPL IA-MCNC: 332.7 PG/ML (ref 0–124)
OSMOLALITY SERPL CALC.SUM OF ELEC: 281.3 MOSMOL/KG (ref 275–300)
PHOSPHATE SERPL-MCNC: 2.8 MG/DL (ref 2.4–4.7)
PLATELET # BLD AUTO: 154 THOU/MM3 (ref 130–400)
PMV BLD AUTO: 10.9 FL (ref 9.4–12.4)
POTASSIUM SERPL-SCNC: 3.8 MEQ/L (ref 3.5–5.2)
PROT SERPL-MCNC: 6.4 G/DL (ref 6.1–8)
RBC # BLD AUTO: 4.62 MILL/MM3 (ref 4.7–6.1)
SEGMENTED NEUTROPHILS ABSOLUTE COUNT: 4.8 THOU/MM3 (ref 1.8–7.7)
SODIUM SERPL-SCNC: 139 MEQ/L (ref 135–145)
TROPONIN T: < 0.01 NG/ML
WBC # BLD AUTO: 7.1 THOU/MM3 (ref 4.8–10.8)

## 2023-04-20 PROCEDURE — 83735 ASSAY OF MAGNESIUM: CPT

## 2023-04-20 PROCEDURE — 2500000003 HC RX 250 WO HCPCS: Performed by: STUDENT IN AN ORGANIZED HEALTH CARE EDUCATION/TRAINING PROGRAM

## 2023-04-20 PROCEDURE — 96374 THER/PROPH/DIAG INJ IV PUSH: CPT

## 2023-04-20 PROCEDURE — 71045 X-RAY EXAM CHEST 1 VIEW: CPT

## 2023-04-20 PROCEDURE — 80053 COMPREHEN METABOLIC PANEL: CPT

## 2023-04-20 PROCEDURE — 83880 ASSAY OF NATRIURETIC PEPTIDE: CPT

## 2023-04-20 PROCEDURE — 99285 EMERGENCY DEPT VISIT HI MDM: CPT

## 2023-04-20 PROCEDURE — 36415 COLL VENOUS BLD VENIPUNCTURE: CPT

## 2023-04-20 PROCEDURE — 85025 COMPLETE CBC W/AUTO DIFF WBC: CPT

## 2023-04-20 PROCEDURE — 93005 ELECTROCARDIOGRAM TRACING: CPT | Performed by: STUDENT IN AN ORGANIZED HEALTH CARE EDUCATION/TRAINING PROGRAM

## 2023-04-20 PROCEDURE — 93005 ELECTROCARDIOGRAM TRACING: CPT | Performed by: EMERGENCY MEDICINE

## 2023-04-20 PROCEDURE — 84100 ASSAY OF PHOSPHORUS: CPT

## 2023-04-20 PROCEDURE — 84484 ASSAY OF TROPONIN QUANT: CPT

## 2023-04-20 PROCEDURE — 82330 ASSAY OF CALCIUM: CPT

## 2023-04-20 RX ORDER — METOPROLOL TARTRATE 5 MG/5ML
10 INJECTION INTRAVENOUS EVERY 10 MIN PRN
Status: DISCONTINUED | OUTPATIENT
Start: 2023-04-20 | End: 2023-04-20

## 2023-04-20 RX ORDER — METOPROLOL TARTRATE 5 MG/5ML
5 INJECTION INTRAVENOUS ONCE
Status: DISCONTINUED | OUTPATIENT
Start: 2023-04-20 | End: 2023-04-20

## 2023-04-20 RX ORDER — DILTIAZEM HYDROCHLORIDE 5 MG/ML
20 INJECTION INTRAVENOUS ONCE
Status: DISCONTINUED | OUTPATIENT
Start: 2023-04-20 | End: 2023-04-20

## 2023-04-20 RX ORDER — METOPROLOL TARTRATE 5 MG/5ML
5 INJECTION INTRAVENOUS EVERY 10 MIN PRN
Status: DISCONTINUED | OUTPATIENT
Start: 2023-04-20 | End: 2023-04-20 | Stop reason: HOSPADM

## 2023-04-20 RX ADMIN — METOPROLOL TARTRATE 10 MG: 5 INJECTION INTRAVENOUS at 15:23

## 2023-04-20 ASSESSMENT — ENCOUNTER SYMPTOMS
TROUBLE SWALLOWING: 0
PHOTOPHOBIA: 0
COUGH: 0
VOMITING: 0
BACK PAIN: 0
SHORTNESS OF BREATH: 0
NAUSEA: 0
SORE THROAT: 0
ABDOMINAL PAIN: 0
DIARRHEA: 0

## 2023-04-20 ASSESSMENT — PAIN - FUNCTIONAL ASSESSMENT: PAIN_FUNCTIONAL_ASSESSMENT: NONE - DENIES PAIN

## 2023-04-20 NOTE — ED NOTES
Patient ambulates around department with mobile heart monitoring. Patient remains in sinus rhythm ranging from 74-86 bpm. Patient denies any complaints while ambulating. Patient back to bed without difficulty. Dr. Etta Goldberg and Dr. John Putnam notified.       Ovidio Pérez RN  04/20/23 [id number]

## 2023-04-20 NOTE — ED PROVIDER NOTES
Final report electronically signed by Dr Jojo Solares on 4/16/2023 3:36 PM      XR CHEST (2 VW)   Final Result   Stable radiographic appearance of the chest. No evidence of an acute process. **This report has been created using voice recognition software. It may contain minor errors which are inherent in voice recognition technology. **      Final report electronically signed by Dr. Adela Maguire on 4/16/2023 12:30 PM          LABS: (none if blank)  Labs Reviewed   COMPREHENSIVE METABOLIC PANEL W/ REFLEX TO MG FOR LOW K - Abnormal; Notable for the following components:       Result Value    Glucose 132 (*)     Creatinine 1.3 (*)     Alkaline Phosphatase 33 (*)     All other components within normal limits   GLOMERULAR FILTRATION RATE, ESTIMATED - Abnormal; Notable for the following components:    Est, Glom Filt Rate 58 (*)     All other components within normal limits   ANTI-XA, UNFRACTIONATED HEPARIN - Abnormal; Notable for the following components:    Heparin Unfractionated 1.81 (*)     All other components within normal limits   LIPID PANEL - Abnormal; Notable for the following components:    Triglycerides 259 (*)     All other components within normal limits   BASIC METABOLIC PANEL - Abnormal; Notable for the following components:    Glucose 122 (*)     Creatinine 1.3 (*)     All other components within normal limits   APTT - Abnormal; Notable for the following components:    aPTT 50.2 (*)     All other components within normal limits   PROTIME-INR - Abnormal; Notable for the following components:    INR 1.31 (*)     All other components within normal limits   APTT - Abnormal; Notable for the following components:    aPTT 56.7 (*)     All other components within normal limits   APTT - Abnormal; Notable for the following components:    aPTT 93.1 (*)     All other components within normal limits   GLOMERULAR FILTRATION RATE, ESTIMATED - Abnormal; Notable for the following components:    Est,

## 2023-04-20 NOTE — ED TRIAGE NOTES
Pt arrives to ED from home with c/o A fib, pt states he had 3 stents placed on 4/17/23. Pt states everything went well, pt states he was sitting at home after lunch today when he felt his heart racing. Pt states he has history of afib. Is on thinners.    Pt denies any other symptoms on arrival

## 2023-04-20 NOTE — ED PROVIDER NOTES
place, and time. Cranial Nerves: No cranial nerve deficit. Sensory: No sensory deficit. Motor: No weakness. Coordination: Coordination normal.         FORMAL DIAGNOSTIC RESULTS     RADIOLOGY: Interpretation per the Radiologist below, if available at the time of this note (none if blank):    XR CHEST PORTABLE   Final Result   No acute process            **This report has been created using voice recognition software. It may contain minor errors which are inherent in voice recognition technology. **      Final report electronically signed by Dr. Oleg Mccracken on 4/20/2023 2:46 PM          LABS: (none if blank)  Labs Reviewed   CBC WITH AUTO DIFFERENTIAL - Abnormal; Notable for the following components:       Result Value    RBC 4.62 (*)     Hemoglobin 13.9 (*)     All other components within normal limits   BRAIN NATRIURETIC PEPTIDE - Abnormal; Notable for the following components:    Pro-.7 (*)     All other components within normal limits   COMPREHENSIVE METABOLIC PANEL - Abnormal; Notable for the following components:    CO2 22 (*)     Glucose 158 (*)     Creatinine 1.3 (*)     All other components within normal limits   CALCIUM, IONIZED - Abnormal; Notable for the following components:    Calcium, Ionized 1.11 (*)     All other components within normal limits   GLOMERULAR FILTRATION RATE, ESTIMATED - Abnormal; Notable for the following components:    Est, Glom Filt Rate 58 (*)     All other components within normal limits   TROPONIN   MAGNESIUM   PHOSPHORUS   OSMOLALITY   ANION GAP       (Any cultures that may have been sent were not resulted at the time of this patient visit)    [address] [facility] [address] / ED COURSE:     Number and Complexity of Problems            Problem List This Visit:         Chief Complaint   Patient presents with    Atrial Fibrillation     rvr            Differential Diagnosis includes (but not limited to):  Electrolyte abnormality, ischemia, arrhythmia    Although

## 2023-04-21 ENCOUNTER — TELEPHONE (OUTPATIENT)
Dept: INTERNAL MEDICINE CLINIC | Age: 73
End: 2023-04-21

## 2023-04-21 ENCOUNTER — TELEPHONE (OUTPATIENT)
Dept: CARDIOLOGY CLINIC | Age: 73
End: 2023-04-21

## 2023-04-21 LAB
EKG ATRIAL RATE: 73 BPM
EKG P AXIS: 60 DEGREES
EKG P-R INTERVAL: 168 MS
EKG Q-T INTERVAL: 324 MS
EKG Q-T INTERVAL: 404 MS
EKG QRS DURATION: 76 MS
EKG QRS DURATION: 78 MS
EKG QTC CALCULATION (BAZETT): 445 MS
EKG QTC CALCULATION (BAZETT): 501 MS
EKG R AXIS: -17 DEGREES
EKG R AXIS: -19 DEGREES
EKG T AXIS: 133 DEGREES
EKG T AXIS: 43 DEGREES
EKG VENTRICULAR RATE: 144 BPM
EKG VENTRICULAR RATE: 73 BPM

## 2023-04-21 PROCEDURE — 93010 ELECTROCARDIOGRAM REPORT: CPT | Performed by: INTERNAL MEDICINE

## 2023-04-23 ENCOUNTER — HOSPITAL ENCOUNTER (EMERGENCY)
Age: 73
Discharge: HOME | End: 2023-04-23
Payer: MEDICARE

## 2023-04-23 VITALS
HEART RATE: 63 BPM | TEMPERATURE: 97.2 F | SYSTOLIC BLOOD PRESSURE: 163 MMHG | OXYGEN SATURATION: 97 % | DIASTOLIC BLOOD PRESSURE: 75 MMHG | RESPIRATION RATE: 16 BRPM

## 2023-04-23 DIAGNOSIS — M10.9 ACUTE GOUT INVOLVING TOE OF LEFT FOOT, UNSPECIFIED CAUSE: Primary | ICD-10-CM

## 2023-04-23 PROCEDURE — 99213 OFFICE O/P EST LOW 20 MIN: CPT

## 2023-04-23 RX ORDER — COLCHICINE 0.6 MG/1
0.6 CAPSULE ORAL DAILY
Qty: 8 CAPSULE | Refills: 0 | Status: SHIPPED | OUTPATIENT
Start: 2023-04-23 | End: 2023-05-01

## 2023-04-23 ASSESSMENT — PAIN SCALES - GENERAL: PAINLEVEL_OUTOF10: 3

## 2023-04-23 ASSESSMENT — PAIN - FUNCTIONAL ASSESSMENT: PAIN_FUNCTIONAL_ASSESSMENT: 0-10

## 2023-04-23 ASSESSMENT — PAIN DESCRIPTION - ORIENTATION: ORIENTATION: LEFT

## 2023-04-23 ASSESSMENT — ENCOUNTER SYMPTOMS
SHORTNESS OF BREATH: 0
BACK PAIN: 0

## 2023-04-23 ASSESSMENT — PAIN DESCRIPTION - LOCATION: LOCATION: FOOT

## 2023-04-24 ENCOUNTER — OFFICE VISIT (OUTPATIENT)
Dept: FAMILY MEDICINE CLINIC | Age: 73
End: 2023-04-24

## 2023-04-24 ENCOUNTER — PATIENT MESSAGE (OUTPATIENT)
Dept: FAMILY MEDICINE CLINIC | Age: 73
End: 2023-04-24

## 2023-04-24 VITALS
RESPIRATION RATE: 18 BRPM | DIASTOLIC BLOOD PRESSURE: 74 MMHG | BODY MASS INDEX: 31.13 KG/M2 | SYSTOLIC BLOOD PRESSURE: 136 MMHG | WEIGHT: 210.8 LBS | HEART RATE: 56 BPM

## 2023-04-24 DIAGNOSIS — Z78.9 STATIN INTOLERANCE: ICD-10-CM

## 2023-04-24 DIAGNOSIS — Z95.820 S/P ANGIOPLASTY WITH STENT: ICD-10-CM

## 2023-04-24 DIAGNOSIS — I25.110 CORONARY ARTERY DISEASE INVOLVING NATIVE CORONARY ARTERY OF NATIVE HEART WITH UNSTABLE ANGINA PECTORIS (HCC): ICD-10-CM

## 2023-04-24 DIAGNOSIS — I73.9 PAD (PERIPHERAL ARTERY DISEASE) (HCC): ICD-10-CM

## 2023-04-24 DIAGNOSIS — I20.0 UNSTABLE ANGINA (HCC): ICD-10-CM

## 2023-04-24 DIAGNOSIS — Z09 HOSPITAL DISCHARGE FOLLOW-UP: Primary | ICD-10-CM

## 2023-04-24 DIAGNOSIS — I48.91 ATRIAL FIBRILLATION WITH RVR (HCC): ICD-10-CM

## 2023-04-24 DIAGNOSIS — M10.071 ACUTE IDIOPATHIC GOUT OF RIGHT ANKLE: ICD-10-CM

## 2023-04-24 DIAGNOSIS — I10 ESSENTIAL HYPERTENSION: ICD-10-CM

## 2023-04-24 DIAGNOSIS — E78.2 MIXED HYPERLIPIDEMIA: ICD-10-CM

## 2023-04-24 DIAGNOSIS — E11.9 TYPE 2 DIABETES MELLITUS WITHOUT COMPLICATION, WITHOUT LONG-TERM CURRENT USE OF INSULIN (HCC): ICD-10-CM

## 2023-04-24 DIAGNOSIS — E03.9 ACQUIRED HYPOTHYROIDISM: ICD-10-CM

## 2023-04-24 RX ORDER — ATENOLOL 25 MG/1
25 TABLET ORAL 2 TIMES DAILY
COMMUNITY
End: 2023-04-24 | Stop reason: DRUGHIGH

## 2023-04-24 RX ORDER — ATENOLOL 25 MG/1
12.5 TABLET ORAL 2 TIMES DAILY
Qty: 30 TABLET | Status: SHIPPED | COMMUNITY
Start: 2023-04-24

## 2023-04-24 ASSESSMENT — ENCOUNTER SYMPTOMS
ABDOMINAL PAIN: 0
NAUSEA: 0
COUGH: 0
SHORTNESS OF BREATH: 0

## 2023-04-24 NOTE — TELEPHONE ENCOUNTER
[facility] called back on Friday that they did receive the message and see the strips. They sent message to Dr. Jesús Ballesteros.

## 2023-04-24 NOTE — PROGRESS NOTES
Post-Discharge Transitional Care Follow Up      Meredith Jarrell   YOB: 1950    Date of Office Visit:  4/24/2023  Date of Hospital Admission: 4/23/23  Date of Hospital Discharge: 4/23/23  Readmission Risk Score (high >=14%. Medium >=10%):Readmission Risk Score: 11.1      Care management risk score Rising risk (score 2-5) and Complex Care (Scores >=6): No Risk Score On File     Non face to face  following discharge, date last encounter closed (first attempt may have been earlier): 04/19/2023     Call initiated 2 business days of discharge: Yes     Hospital discharge follow-up  -     SD DISCHARGE MEDS RECONCILED W/ CURRENT OUTPATIENT MED LIST  Unstable angina ([address]  S/P angioplasty with stent  Coronary artery disease involving native coronary artery of native heart with unstable angina pectoris ([address]  Atrial fibrillation with RVR ([address]  Type 2 diabetes mellitus without complication, without long-term current use of insulin (HCC)  PAD (peripheral artery disease) (HCC)  Statin intolerance  Mixed hyperlipidemia  Essential hypertension  Acquired hypothyroidism  Acute idiopathic gout of right ankle      MDM:  Follow up with CARDIO 4/26/23. Atenolol 12.5 mg BID for AFIB control. Labs reviewed. ECHO reviewed. Lightheadedness resolved with STENT placement. NSR in office     Gout improving - finish out colchine  Hold on long term gout treatment due to cause related to hospitalization      RTO PRN    Medical Decision Making: high complexity  Return if symptoms worsen or fail to improve. Subjective:   HPI    Admit Date: 4/16/2023   Discharge Date: 4/18/2023       Admitting Physician: Zara Murphy MD  Discharge Physician: Annie Sadler MD  PGY-1 Internal Medicine Resident       Patient and discharge plan discussed with Attending, Dr. Payam Vazquez.       Discharge Diagnoses:  #Unstable angina s/p PCI to LAD on 4/17: Presented with 1 day history of intermittent substernal chest pain at rest, radiating to

## 2023-04-25 NOTE — PROCEDURES
[address] [phone]                            CARDIAC CATHETERIZATION    PATIENT NAME: Cara Keller                  :        1950  MED REC NO:   [id number]                           ROOM:       0004  ACCOUNT NO:   [de-identified]                           ADMIT DATE: 2023  PROVIDER:     Jana Michele MD    DATE OF PROCEDURE:  2023    CARDIAC CATHETERIZATION REPORT    PROCEDURES PERFORMED:  Coronary angiogram, FFR of LAD, IVUS of LAD, PCI  of LAD. INDICATION FOR STUDY:  Unstable angina. DESCRIPTION OF PROCEDURE:  After written informed consent was obtained,  the patient was brought to the cardiac catheterization laboratory in a  fasting, nonsedated state. Preprocedure time-out was performed. 2%  lidocaine was used to anesthetize subcutaneous tissue overlying the  right radial artery. Using a modified Seldinger technique, a 6-Citizen of the [place]  Slender arterial sheath was placed in the right radial artery. Once the  sheath was in place, a radial cocktail was given to reduce radial artery  spasm/occlusion. EQUIPMENTS USED:  Standard 5-Citizen of the [place] JR-4, JL-3.5 diagnostic catheters as  well as EBU 3.5 6-Citizen of the [place] guide catheter, [employer] IVUS  catheter, FFR wire. ESTIMATED BLOOD LOSS:  During this procedure was less than 20 mL. MEDICATIONS  Please see MAR. CORONARY ANATOMY:  Right coronary artery appears to be dominant vessel. There is a patent  stent in the proximal portion and midportion, there is mild in-stent  restenosis in mid segment followed by mild-to-moderate luminal  irregularities of the distal segment of the RCA. There is moderate size  PDA and PL branches, both of which are patent. Left main is patent, gives rise to LAD and left circumflex arteries. Left circumflex artery is patent in proximal portion.   Midportion, there  is a 60 to 70% stenosis followed by moderate luminal

## 2023-04-26 ENCOUNTER — OFFICE VISIT (OUTPATIENT)
Dept: CARDIOLOGY CLINIC | Age: 73
End: 2023-04-26
Payer: MEDICARE

## 2023-04-26 VITALS
HEART RATE: 51 BPM | HEIGHT: 69 IN | DIASTOLIC BLOOD PRESSURE: 82 MMHG | BODY MASS INDEX: 31.28 KG/M2 | SYSTOLIC BLOOD PRESSURE: 164 MMHG | WEIGHT: 211.2 LBS

## 2023-04-26 DIAGNOSIS — I25.10 CORONARY ARTERY DISEASE INVOLVING NATIVE CORONARY ARTERY OF NATIVE HEART WITHOUT ANGINA PECTORIS: ICD-10-CM

## 2023-04-26 DIAGNOSIS — I10 ESSENTIAL HYPERTENSION: ICD-10-CM

## 2023-04-26 DIAGNOSIS — I48.91 ATRIAL FIBRILLATION WITH RVR (HCC): Primary | ICD-10-CM

## 2023-04-26 PROCEDURE — 99214 OFFICE O/P EST MOD 30 MIN: CPT | Performed by: STUDENT IN AN ORGANIZED HEALTH CARE EDUCATION/TRAINING PROGRAM

## 2023-04-26 PROCEDURE — 3079F DIAST BP 80-89 MM HG: CPT | Performed by: STUDENT IN AN ORGANIZED HEALTH CARE EDUCATION/TRAINING PROGRAM

## 2023-04-26 PROCEDURE — G8427 DOCREV CUR MEDS BY ELIG CLIN: HCPCS | Performed by: STUDENT IN AN ORGANIZED HEALTH CARE EDUCATION/TRAINING PROGRAM

## 2023-04-26 PROCEDURE — 3077F SYST BP >= 140 MM HG: CPT | Performed by: STUDENT IN AN ORGANIZED HEALTH CARE EDUCATION/TRAINING PROGRAM

## 2023-04-26 PROCEDURE — 1123F ACP DISCUSS/DSCN MKR DOCD: CPT | Performed by: STUDENT IN AN ORGANIZED HEALTH CARE EDUCATION/TRAINING PROGRAM

## 2023-04-26 PROCEDURE — 1111F DSCHRG MED/CURRENT MED MERGE: CPT | Performed by: STUDENT IN AN ORGANIZED HEALTH CARE EDUCATION/TRAINING PROGRAM

## 2023-04-26 PROCEDURE — 3017F COLORECTAL CA SCREEN DOC REV: CPT | Performed by: STUDENT IN AN ORGANIZED HEALTH CARE EDUCATION/TRAINING PROGRAM

## 2023-04-26 PROCEDURE — G8417 CALC BMI ABV UP PARAM F/U: HCPCS | Performed by: STUDENT IN AN ORGANIZED HEALTH CARE EDUCATION/TRAINING PROGRAM

## 2023-04-26 PROCEDURE — 1036F TOBACCO NON-USER: CPT | Performed by: STUDENT IN AN ORGANIZED HEALTH CARE EDUCATION/TRAINING PROGRAM

## 2023-04-26 RX ORDER — AMLODIPINE BESYLATE 5 MG/1
5 TABLET ORAL DAILY
Qty: 30 TABLET | Refills: 3 | Status: SHIPPED | OUTPATIENT
Start: 2023-04-26

## 2023-04-26 NOTE — PROGRESS NOTES
[facility] PROFESSIONAL SERVICES  [facility] [place]   [address]   [address] [address]   Dept: [phone]   Dept Fax: [phone]: [phone]      Chief Complaint   Patient presents with    Follow-Up from Hospital     2 week was having afib and wants to possible be started on beta blocker again and pt blood pressure has been high and pulse has been low wife is concerned      Cardiologist:  Dr. Rut Rivera  69 yo male presents for hfu for [place], s/p [facility] with PCI to LAD, preserved EF. CKD3, HTN, HLD, PAF, went back into afib with rvr. BP has been high, HR has been low. No chest pain, SOB. No dizziness or lightheadedness. Has  not been active since the PCI. Has been worried about his BP and HR being off. Overall, has been feeling okay for the most part. Planning to do rehab, has not been called again yet.      General:   No fever, no chills, no weight loss, no fatigue  Pulmonary:    No dyspnea, no wheezing  Cardiac:    Denies recent chest pain   GI:     No nausea or vomiting, no abdominal pain  Neuro:      No dizziness or light headedness  Musculoskeletal:  No recent active issues  Extremities:   No edema      Past Medical History:   Diagnosis Date    Acquired hypothyroidism 2/21/2019    CAD (coronary artery disease)     Cancer (HCC)     SKIN    DDD (degenerative disc disease), cervical 4/10/2017    GERD (gastroesophageal reflux disease)     Hyperlipidemia     Hypertension        Allergies   Allergen Reactions    Crestor [Rosuvastatin]      Muscle aches    Tape [Adhesive Tape] Rash     Blisters and red       Current Outpatient Medications   Medication Sig Dispense Refill    atenolol (TENORMIN) 25 MG tablet Take 0.5 tablets by mouth in the morning and at bedtime 30 tablet     colchicine (MITIGARE) 0.6 MG capsule Take 1 capsule by mouth daily for 8 days Please take 2 tablets for the first dose Take 1 tablet an hour after the first dose Take 1 tablet daily after until flare is gone 8

## 2023-04-27 LAB
EKG ATRIAL RATE: 59 BPM
EKG ATRIAL RATE: 63 BPM
EKG ATRIAL RATE: 66 BPM
EKG P AXIS: 35 DEGREES
EKG P AXIS: 44 DEGREES
EKG P AXIS: 65 DEGREES
EKG P-R INTERVAL: 152 MS
EKG P-R INTERVAL: 168 MS
EKG P-R INTERVAL: 192 MS
EKG Q-T INTERVAL: 416 MS
EKG Q-T INTERVAL: 426 MS
EKG Q-T INTERVAL: 438 MS
EKG QRS DURATION: 84 MS
EKG QRS DURATION: 86 MS
EKG QRS DURATION: 88 MS
EKG QTC CALCULATION (BAZETT): 425 MS
EKG QTC CALCULATION (BAZETT): 433 MS
EKG QTC CALCULATION (BAZETT): 446 MS
EKG R AXIS: -12 DEGREES
EKG R AXIS: 23 DEGREES
EKG T AXIS: 29 DEGREES
EKG T AXIS: 39 DEGREES
EKG T AXIS: 8 DEGREES
EKG VENTRICULAR RATE: 59 BPM
EKG VENTRICULAR RATE: 63 BPM
EKG VENTRICULAR RATE: 66 BPM

## 2023-05-02 ENCOUNTER — OFFICE VISIT (OUTPATIENT)
Dept: NEUROLOGY | Age: 73
End: 2023-05-02
Payer: MEDICARE

## 2023-05-02 VITALS
WEIGHT: 211 LBS | DIASTOLIC BLOOD PRESSURE: 82 MMHG | HEART RATE: 57 BPM | SYSTOLIC BLOOD PRESSURE: 144 MMHG | BODY MASS INDEX: 30.21 KG/M2 | HEIGHT: 70 IN | OXYGEN SATURATION: 98 %

## 2023-05-02 DIAGNOSIS — R55 NEAR SYNCOPE: ICD-10-CM

## 2023-05-02 DIAGNOSIS — R42 DIZZINESS: Primary | ICD-10-CM

## 2023-05-02 PROCEDURE — 1123F ACP DISCUSS/DSCN MKR DOCD: CPT | Performed by: PSYCHIATRY & NEUROLOGY

## 2023-05-02 PROCEDURE — 1036F TOBACCO NON-USER: CPT | Performed by: PSYCHIATRY & NEUROLOGY

## 2023-05-02 PROCEDURE — 1111F DSCHRG MED/CURRENT MED MERGE: CPT | Performed by: PSYCHIATRY & NEUROLOGY

## 2023-05-02 PROCEDURE — 3017F COLORECTAL CA SCREEN DOC REV: CPT | Performed by: PSYCHIATRY & NEUROLOGY

## 2023-05-02 PROCEDURE — 3074F SYST BP LT 130 MM HG: CPT | Performed by: PSYCHIATRY & NEUROLOGY

## 2023-05-02 PROCEDURE — G8427 DOCREV CUR MEDS BY ELIG CLIN: HCPCS | Performed by: PSYCHIATRY & NEUROLOGY

## 2023-05-02 PROCEDURE — 3078F DIAST BP <80 MM HG: CPT | Performed by: PSYCHIATRY & NEUROLOGY

## 2023-05-02 PROCEDURE — G8417 CALC BMI ABV UP PARAM F/U: HCPCS | Performed by: PSYCHIATRY & NEUROLOGY

## 2023-05-02 PROCEDURE — 99205 OFFICE O/P NEW HI 60 MIN: CPT | Performed by: PSYCHIATRY & NEUROLOGY

## 2023-05-02 NOTE — PATIENT INSTRUCTIONS
MRI brain WO contrast  CTA head and neck W/WO contrast  Continue following with cardiology   Referral to sleep medicine to screen for underlying sleep disorder  Vitamin B12, folate  Call with any new symptoms or concerns. Follow up in 1 month.

## 2023-05-04 LAB
FOLATE: 19.5 UG/L
VITAMIN B-12: 453 PG/ML (ref 200–950)

## 2023-05-09 ENCOUNTER — TELEPHONE (OUTPATIENT)
Dept: CARDIOLOGY CLINIC | Age: 73
End: 2023-05-09

## 2023-05-09 NOTE — TELEPHONE ENCOUNTER
Result note for Cardiac Event Monitor    ----- Message from Thelma Norwood PA-C sent at 5/9/2023  7:26 AM EDT -----  He has been back on atenolol. His HR has been lower at baseline so not much room to increase that. May need to consider anti-arrhythmic given ongoing rvr episodes. May need to see [facility] or I sooner to discuss this.

## 2023-05-16 PROCEDURE — G0423 INTENS CARDIAC REHAB NO EXER: HCPCS

## 2023-05-16 PROCEDURE — G0422 INTENS CARDIAC REHAB W/EXERC: HCPCS

## 2023-05-17 ENCOUNTER — OFFICE VISIT (OUTPATIENT)
Dept: CARDIOLOGY CLINIC | Age: 73
End: 2023-05-17
Payer: MEDICARE

## 2023-05-17 VITALS
WEIGHT: 210.5 LBS | HEART RATE: 62 BPM | SYSTOLIC BLOOD PRESSURE: 152 MMHG | HEIGHT: 70 IN | DIASTOLIC BLOOD PRESSURE: 72 MMHG | BODY MASS INDEX: 30.14 KG/M2

## 2023-05-17 DIAGNOSIS — I48.91 ATRIAL FIBRILLATION WITH RVR (HCC): Primary | ICD-10-CM

## 2023-05-17 DIAGNOSIS — I25.110 CORONARY ARTERY DISEASE INVOLVING NATIVE CORONARY ARTERY OF NATIVE HEART WITH UNSTABLE ANGINA PECTORIS (HCC): ICD-10-CM

## 2023-05-17 PROCEDURE — 93000 ELECTROCARDIOGRAM COMPLETE: CPT | Performed by: STUDENT IN AN ORGANIZED HEALTH CARE EDUCATION/TRAINING PROGRAM

## 2023-05-17 PROCEDURE — 3017F COLORECTAL CA SCREEN DOC REV: CPT | Performed by: STUDENT IN AN ORGANIZED HEALTH CARE EDUCATION/TRAINING PROGRAM

## 2023-05-17 PROCEDURE — 99214 OFFICE O/P EST MOD 30 MIN: CPT | Performed by: STUDENT IN AN ORGANIZED HEALTH CARE EDUCATION/TRAINING PROGRAM

## 2023-05-17 PROCEDURE — 3078F DIAST BP <80 MM HG: CPT | Performed by: STUDENT IN AN ORGANIZED HEALTH CARE EDUCATION/TRAINING PROGRAM

## 2023-05-17 PROCEDURE — 1111F DSCHRG MED/CURRENT MED MERGE: CPT | Performed by: STUDENT IN AN ORGANIZED HEALTH CARE EDUCATION/TRAINING PROGRAM

## 2023-05-17 PROCEDURE — G8417 CALC BMI ABV UP PARAM F/U: HCPCS | Performed by: STUDENT IN AN ORGANIZED HEALTH CARE EDUCATION/TRAINING PROGRAM

## 2023-05-17 PROCEDURE — G8427 DOCREV CUR MEDS BY ELIG CLIN: HCPCS | Performed by: STUDENT IN AN ORGANIZED HEALTH CARE EDUCATION/TRAINING PROGRAM

## 2023-05-17 PROCEDURE — 1036F TOBACCO NON-USER: CPT | Performed by: STUDENT IN AN ORGANIZED HEALTH CARE EDUCATION/TRAINING PROGRAM

## 2023-05-17 PROCEDURE — 1123F ACP DISCUSS/DSCN MKR DOCD: CPT | Performed by: STUDENT IN AN ORGANIZED HEALTH CARE EDUCATION/TRAINING PROGRAM

## 2023-05-17 PROCEDURE — 3077F SYST BP >= 140 MM HG: CPT | Performed by: STUDENT IN AN ORGANIZED HEALTH CARE EDUCATION/TRAINING PROGRAM

## 2023-05-17 NOTE — PROGRESS NOTES
[facility] PROFESSIONAL SERVICES  [facility]   [address] [place] [address]   [address] [address]   Dept: [phone]   Dept Fax: [phone]   Loc: [phone]      Chief Complaint   Patient presents with    Check-Up    Coronary Artery Disease    Atrial Fibrillation     Cardiologist:  Dr. Streeter Height  67 yo male presents for recurrent paf with rvr. s/p LHC with PCI to LAD recent, preserved EF. CKD3, HTN, HLD, PAF, went back into afib with rvr. Has felt much better over the last couple weeks. Only once more did he notice palpitations since the monitor. Was symptomatic with these previously. On norvasc now and atenolol. No chest pain. Sob is improved. No swelling or weight gain. BP is better with adding norvasc.      General:   No fever, no chills, no weight loss, no fatigue  Pulmonary:    No dyspnea, no wheezing  Cardiac:    Denies recent chest pain   GI:     No nausea or vomiting, no abdominal pain  Neuro:      No dizziness or light headedness  Musculoskeletal:  No recent active issues  Extremities:   No edema      Past Medical History:   Diagnosis Date    A-fib ([facility] [address]     Acquired hypothyroidism 02/21/2019    CAD (coronary artery disease)     Cancer (HCC)     SKIN    DDD (degenerative disc disease), cervical 04/10/2017    GERD (gastroesophageal reflux disease)     Gout     Hyperlipidemia     Hypertension        Allergies   Allergen Reactions    Crestor [Rosuvastatin]      Muscle aches    Tape [Adhesive Tape] Rash     Blisters and red       Current Outpatient Medications   Medication Sig Dispense Refill    amLODIPine (NORVASC) 5 MG tablet Take 1 tablet by mouth daily 30 tablet 3    atenolol (TENORMIN) 25 MG tablet Take 0.5 tablets by mouth in the morning and at bedtime 30 tablet     valsartan (DIOVAN) 160 MG tablet Take 1 tablet by mouth 2 times daily 60 tablet 3    Alirocumab (PRALUENT) 150 MG/ML SOAJ Inject 1 mL into the skin every 30 days 3 mL 3    clopidogrel (PLAVIX) 75 MG tablet Take 1

## 2023-05-17 NOTE — PROGRESS NOTES
Pt here for check up- consider anti- arrhythmic given ongoing    EKG done today     Pt continues with sob , one episode last week heart palpitations     Pt concerned about meds

## 2023-05-18 ENCOUNTER — HOSPITAL ENCOUNTER (OUTPATIENT)
Dept: CARDIAC REHAB | Age: 73
Setting detail: THERAPIES SERIES
Discharge: HOME | End: 2023-05-16
Payer: MEDICARE

## 2023-05-18 VITALS — HEIGHT: 70 IN | BODY MASS INDEX: 29.97 KG/M2 | WEIGHT: 209.38 LBS

## 2023-05-18 RX ORDER — NITROGLYCERIN 0.4 MG/1
TABLET SUBLINGUAL
Qty: 25 TABLET | Refills: 0 | Status: SHIPPED | OUTPATIENT
Start: 2023-05-18

## 2023-05-18 ASSESSMENT — PATIENT HEALTH QUESTIONNAIRE - PHQ9
6. FEELING BAD ABOUT YOURSELF - OR THAT YOU ARE A FAILURE OR HAVE LET YOURSELF OR YOUR FAMILY DOWN: 0
2. FEELING DOWN, DEPRESSED OR HOPELESS: 0
SUM OF ALL RESPONSES TO PHQ9 QUESTIONS 1 & 2: 0
SUM OF ALL RESPONSES TO PHQ QUESTIONS 1-9: 1
9. THOUGHTS THAT YOU WOULD BE BETTER OFF DEAD, OR OF HURTING YOURSELF: 0
1. LITTLE INTEREST OR PLEASURE IN DOING THINGS: 0
SUM OF ALL RESPONSES TO PHQ QUESTIONS 1-9: 1
10. IF YOU CHECKED OFF ANY PROBLEMS, HOW DIFFICULT HAVE THESE PROBLEMS MADE IT FOR YOU TO DO YOUR WORK, TAKE CARE OF THINGS AT HOME, OR GET ALONG WITH OTHER PEOPLE: 0
SUM OF ALL RESPONSES TO PHQ QUESTIONS 1-9: 1
5. POOR APPETITE OR OVEREATING: 0
3. TROUBLE FALLING OR STAYING ASLEEP: 0
8. MOVING OR SPEAKING SO SLOWLY THAT OTHER PEOPLE COULD HAVE NOTICED. OR THE OPPOSITE, BEING SO FIGETY OR RESTLESS THAT YOU HAVE BEEN MOVING AROUND A LOT MORE THAN USUAL: 0
4. FEELING TIRED OR HAVING LITTLE ENERGY: 1
SUM OF ALL RESPONSES TO PHQ QUESTIONS 1-9: 1
7. TROUBLE CONCENTRATING ON THINGS, SUCH AS READING THE NEWSPAPER OR WATCHING TELEVISION: 0

## 2023-05-18 NOTE — PROGRESS NOTES
Video Education Report - ICR/CR  Name:  Sangita Archer     Date:  5/18/2023  MRN: [id number]     Session #:  1  Session Length: 40 min    Core Videos        [x]Heart Disease Risk Reduction       []Overview of Pritikin Eating Plan      []Move it          []Calorie Density         []Healthy Minds, Bodies, Hearts        []Label Reading - Part 1       []Metabolic Syndrome and Belly Fat        []How Our Thoughts Can Heal Our Hearts   []Dining Out - Part 1      []Biomechanical Limitations  []Facts on Fat        []Hypertension & Heart Disease    []Diseases of Our Time - Focusing on Diabetes   []Body Composition  []Nutrition Action Plan    []Exercise Action Plan      Comments:  Video completed, group discussion

## 2023-05-18 NOTE — PLAN OF CARE
[address] [place] [address] Facility-Based Program  Individualized Cardiac Treatment Plan    Patient Name:  Crystal Lindsay  :  1950  Age:  68 y.o. MRN:  [id number]  Diagnosis: PCI to LAD  Date of Event: 23   Physician:  Sagar Regan CNP/Shaneka  Next Office Visit:  10/30/23  Date Entered Program: 23  Risk Stratifications: [] Low [] Intermediate [] High  Allergies:    Allergies   Allergen Reactions    Crestor [Rosuvastatin]      Muscle aches    Tape Taisha Matte Tape] Rash     Blisters and red       Individual Cardiac Treatment Plan -EXERCISE  INITIAL 30 DAY 60 DAY 90  DAY FINAL DAY   EXERCISE  ASSESSMENT/PLAN EXERCISE  REASSESSMENT EXERCISE   REASSESSMENT EXERCISE   REASSESSMENT EXERCISE   REASSESSMENT EXERCISE  DISCHARGE/FOLLOW-UP   Date: 23 Date: Date: Date: Date: Date:   Session #1   Total Session #   First Exercise Session:   Total Session #  Total Session #  Total Session #  Total Session #   Last Exercise Session:     Stages of Change Stages of Change Stages of Change Stages of Change Stages of Change Stages of Change   [] Pre Contemplation  [x] Contemplation  [] Preparation  [] Action  [] Maintenance  [] Relapse [] Pre Contemplation  [] Contemplation  [] Preparation  [] Action  [] Maintenance  [] Relapse [] Pre Contemplation  [] Contemplation  [] Preparation  [] Action  [] Maintenance  [] Relapse [] Pre Contemplation  [] Contemplation  [] Preparation  [] Action  [] Maintenance  [] Relapse [] Pre Contemplation  [] Contemplation  [] Preparation  [] Action  [] Maintenance  [] Relapse [] Pre Contemplation  [] Contemplation  [] Preparation  [] Action  [] Maintenance  [] Relapse   EXERCISE ASSESSMENT EXERCISE ASSESSMENT EXERCISE ASSESSMENT EXERCISE ASSESSMENT EXERCISE ASSESSMENT EXERCISE ASSESSMENT   6 Min Walk Test  Distance walked:   0.21 miles  1108 ft.  2.61 METs  Max HR:80 BPM      RPE:  11    THR:  106-139  Rhythm:  NSR     6 Min Walk

## 2023-05-22 ENCOUNTER — HOSPITAL ENCOUNTER (OUTPATIENT)
Dept: CARDIAC REHAB | Age: 73
Setting detail: THERAPIES SERIES
Discharge: HOME | End: 2023-05-22
Payer: MEDICARE

## 2023-05-22 PROCEDURE — G0423 INTENS CARDIAC REHAB NO EXER: HCPCS

## 2023-05-22 PROCEDURE — G0422 INTENS CARDIAC REHAB W/EXERC: HCPCS

## 2023-05-22 RX ORDER — ATENOLOL 25 MG/1
12.5 TABLET ORAL 2 TIMES DAILY
Qty: 90 TABLET | Refills: 1 | Status: SHIPPED | OUTPATIENT
Start: 2023-05-22

## 2023-05-22 NOTE — PROGRESS NOTES
Everett Mcguire   :  1950    Acct Number: [de-identified]   MRN:  [id number]                             [facility] NUTRITION WORKSHOP             Date: 2023        Session # _______    Estrella Chavez class covered:    []   Fueling a Healthy Body  []   Mindful Eating  []   Targeting Nutrition Priorities  [x]   Dining Out :  Making the Most of a Menu  []   Label Reading    Readiness to change:    []  Pre-contemplative   []  Contemplative - ambivalent about change    []  Action - ready to set action plan and implement   []  Maintenance - has made change and is trying, and or practicing different alternative         behaviors     Mariano Christensen was in the Workshop with the Dietitian for 40 minutes. The content was presented via Powerpoint, lecture, and patient participation based format. Motivational interviewing was utilized when needed, to promote change. Patient voiced understanding.     Electronically signed by Janine Vieira RD on 2023 at 3:45 PM

## 2023-05-23 ENCOUNTER — HOSPITAL ENCOUNTER (OUTPATIENT)
Dept: CT IMAGING | Age: 73
Discharge: HOME | End: 2023-05-23
Payer: MEDICARE

## 2023-05-23 ENCOUNTER — HOSPITAL ENCOUNTER (OUTPATIENT)
Dept: MRI IMAGING | Age: 73
Discharge: HOME | End: 2023-05-23
Payer: MEDICARE

## 2023-05-23 DIAGNOSIS — R42 DIZZINESS: ICD-10-CM

## 2023-05-23 DIAGNOSIS — R55 NEAR SYNCOPE: ICD-10-CM

## 2023-05-23 LAB — POC CREATININE WHOLE BLOOD: 1.3 MG/DL (ref 0.5–1.2)

## 2023-05-23 PROCEDURE — 82565 ASSAY OF CREATININE: CPT

## 2023-05-23 PROCEDURE — 70498 CT ANGIOGRAPHY NECK: CPT

## 2023-05-23 PROCEDURE — 6360000004 HC RX CONTRAST MEDICATION: Performed by: NURSE PRACTITIONER

## 2023-05-23 PROCEDURE — 70496 CT ANGIOGRAPHY HEAD: CPT

## 2023-05-23 PROCEDURE — 70551 MRI BRAIN STEM W/O DYE: CPT

## 2023-05-23 RX ADMIN — IOPAMIDOL 80 ML: 755 INJECTION, SOLUTION INTRAVENOUS at 08:03

## 2023-05-24 ENCOUNTER — HOSPITAL ENCOUNTER (OUTPATIENT)
Dept: CARDIAC REHAB | Age: 73
Setting detail: THERAPIES SERIES
Discharge: HOME | End: 2023-05-24
Payer: MEDICARE

## 2023-05-24 PROCEDURE — G0423 INTENS CARDIAC REHAB NO EXER: HCPCS

## 2023-05-24 PROCEDURE — G0422 INTENS CARDIAC REHAB W/EXERC: HCPCS

## 2023-05-24 NOTE — PROGRESS NOTES
Video Education Report - ICR/CR  Name:  Mike Guajardo     Date:  5/24/2023  MRN: [id number]     Session #:    Session Length: 40 min    Core Videos        []Heart Disease Risk Reduction       [x]Overview of Pritikin Eating Plan      []Move it          []Calorie Density         []Healthy Minds, Bodies, Hearts        []Label Reading - Part 1       []Metabolic Syndrome and Belly Fat        []How Our Thoughts Can Heal Our Hearts   []Dining Out - Part 1      []Biomechanical Limitations  []Facts on Fat        []Hypertension & Heart Disease    []Diseases of Our Time - Focusing on Diabetes   []Body Composition  []Nutrition Action Plan    []Exercise Action Plan    Exercise      Healthy Mind-Set  []Improving Performance    []Smoking Cessation    []Introduction to [address]  []Aging-Enhancing the Quality of Your Life  []Becoming a Pritikin   []Biology of Weight Control    []Cooking Breakfasts   []Decoding Lab Results    and Snacks  []Diseases of Our Time - Overview   []Cooking Dinner and   []Sleep Disorders     Sides  []Targeting Your Nutrition Priorities   []Healthy Salads &   Dressings  Nutrition      []Cooking Soups and   []Dining Out - Part 2     Desserts  []Fueling a Healthy Body   []Menu Workshop     Overview  []Planning Your Eating Strategy   []The Pritikin Solution  []Vitamins and Minerals    Comments:  Video completed, group discussion

## 2023-05-26 ENCOUNTER — HOSPITAL ENCOUNTER (OUTPATIENT)
Dept: CARDIAC REHAB | Age: 73
Setting detail: THERAPIES SERIES
Discharge: HOME | End: 2023-05-26
Payer: MEDICARE

## 2023-05-26 PROCEDURE — G0423 INTENS CARDIAC REHAB NO EXER: HCPCS

## 2023-05-26 PROCEDURE — G0422 INTENS CARDIAC REHAB W/EXERC: HCPCS

## 2023-05-26 NOTE — PROGRESS NOTES
Video Education Report - ICR/CR  Name:  Regine Butt     Date:  5/26/2023  MRN: [id number]     Session #:    Session Length: 36 min      Medical      Culinary  []Aging-Enhancing the Quality of Your Life  []Becoming a [de-identified]   []Biology of Weight Control    []Cooking Breakfasts   []Decoding Lab Results    and Snacks  []Diseases of Our Time - Overview   [x]Cooking Dinner and   []Sleep Disorders     Sides  []Targeting Your Nutrition Priorities   []Healthy Salads &   Dressings  Nutrition      []Cooking Soups and   []Dining Out - Part 2     Desserts  []Fueling a Healthy Body   []Menu Workshop     Overview  []Planning Your Eating Strategy   []The Pritikin Solution  []Vitamins and Minerals    Comments:  Video completed, group discussion

## 2023-05-30 ENCOUNTER — HOSPITAL ENCOUNTER (OUTPATIENT)
Dept: CARDIAC REHAB | Age: 73
Setting detail: THERAPIES SERIES
Discharge: HOME | End: 2023-05-30
Payer: MEDICARE

## 2023-05-30 PROCEDURE — G0423 INTENS CARDIAC REHAB NO EXER: HCPCS

## 2023-05-30 PROCEDURE — G0422 INTENS CARDIAC REHAB W/EXERC: HCPCS

## 2023-05-30 NOTE — PROGRESS NOTES
Video Education Report - ICR/CR  Name:  Jessica López     Date:  5/30/2023  MRN: [id number]     Session #:    Session Length: 40 min    Core Videos        []Heart Disease Risk Reduction       []Overview of Pritikin Eating Plan      [x]Move it          []Calorie Density         []Healthy Minds, Bodies, Hearts        []Label Reading - Part 1       []Metabolic Syndrome and Belly Fat        []How Our Thoughts Can Heal Our Hearts   []Dining Out - Part 1      []Biomechanical Limitations  []Facts on Fat        []Hypertension & Heart Disease    []Diseases of Our Time - Focusing on Diabetes   []Body Composition  []Nutrition Action Plan    []Exercise Action Plan    Comments:  Video completed, group discussion

## 2023-05-31 ENCOUNTER — HOSPITAL ENCOUNTER (OUTPATIENT)
Dept: CARDIAC REHAB | Age: 73
Setting detail: THERAPIES SERIES
Discharge: HOME | End: 2023-05-31
Payer: MEDICARE

## 2023-05-31 PROCEDURE — G0423 INTENS CARDIAC REHAB NO EXER: HCPCS

## 2023-05-31 PROCEDURE — G0422 INTENS CARDIAC REHAB W/EXERC: HCPCS

## 2023-05-31 NOTE — PROGRESS NOTES
Oli BASS.:  1950    Acct Number: [de-identified]   MRN:  [id number]                             [facility] HEALTHY MIND-SET WORKSHOP             Date: 2023        Session #________    lili Friday class covered:    []  New Thoughts New Behaviors Workshop:  Patient will learn and practice techniques for developing effective health and lifestyle goals. Patient will be able to effectively apply the goal setting process learned to develop at least one new personal goal.     []  Managing Moods & Relationships Workshop:  Patient will learn how emotional and chronic stress factors can impact their hearts. They will learn healthy ways to handle stress and utilize positive coping mechanisms. In addition, [facility] patient will learn ways to improve communication skills. [x]  Healthy Sleep for a healthy Heart:  Patients will learn the importance of sleep to overall health, well-being, and quality of life. They will understand the symptoms of, and treatments for, common sleep disorders. Patients will also be able to identify daytime and nighttime behaviors which impact sleep, and they will be able to apply these tools to help manage sleep-related challenges. []  Recognizing and Reducing Stress:  Patients will learn about stress and how to recognize stress. Patients will gain insight into the toll that chronic stress takes on their health, both emotionally and physically. Patients will learn and practice a variety of stress management techniques. Patients will be able to effectively apply coping mechanisms in perceived stressful situations. Patient actively participated and verbalized understanding. Total time in the Healthy Mind-Set class was 60 minutes.     Electronically signed by DHEERAJ Prabhakar on 2023 at 4:22 PM

## 2023-06-02 ENCOUNTER — HOSPITAL ENCOUNTER (OUTPATIENT)
Dept: CARDIAC REHAB | Age: 73
Setting detail: THERAPIES SERIES
Discharge: HOME | End: 2023-06-02
Payer: MEDICARE

## 2023-06-02 PROCEDURE — G0423 INTENS CARDIAC REHAB NO EXER: HCPCS

## 2023-06-02 PROCEDURE — G0422 INTENS CARDIAC REHAB W/EXERC: HCPCS

## 2023-06-02 NOTE — PROGRESS NOTES
Frankie BASS.:  1950  Acct Number: [de-identified]  MRN:  [id number]                  [facility] COOKING SCHOOL WORKSHOP             Date: 2023        Session # ________   Sabino Seip class covered:    [address] [facility]    [] Adding Flavor - Sodium-Free  [] Fast & Healthy Breakfasts    [] Powerhouse Plant-Based Proteins [] Satisfying Salads and Dressings    [] Simple Sides & Sauces   [] Personalizing Your Plate    [] Delicious Desserts    [] Savory Soups    [] Efficiency Cooking - Meals in a Snap [] Tasty Appetizers & Snacks     [] Comforting Weekend Breakfasts  [x] One-Pot Wonders    [] Fast Evening Meals   [] Easy Entertaining    []  International Cuisine Spotlight on the Blue Zone          Patients were shown how to choose, prep, and cook; substitutions and other options were given. Samples were offered. Recipes were given and questions answered. The patient above was in the [employer] for 45 minutes.       Electronically signed by Billy Abdullahi RD on 2023 at 2:34 PM

## 2023-06-05 ENCOUNTER — HOSPITAL ENCOUNTER (OUTPATIENT)
Dept: CARDIAC REHAB | Age: 73
Setting detail: THERAPIES SERIES
Discharge: HOME | End: 2023-06-05
Payer: MEDICARE

## 2023-06-05 PROCEDURE — G0423 INTENS CARDIAC REHAB NO EXER: HCPCS

## 2023-06-05 PROCEDURE — G0422 INTENS CARDIAC REHAB W/EXERC: HCPCS

## 2023-06-05 NOTE — PROGRESS NOTES
Ashli BASS.:  1950    MRN:  [id number]    Date: 2023      Session Length:  40 min   Session # _______    EXERCISE WORKSHOP:  Balance Training and Fall Prevention      Todays class addressed the importance of patient's sensorimotor skills (vision, proprioception, and the vestibular system) in maintaining their ability to balance at any age. Reviewed a variety of balancing and strength training exercises that are appropriate for patient's current level of function. Reviewed common causes of poor balance, possible solutions to these problems, and ways to modify the physical environment in order to minimize fall risks. Readiness to change:   ( ) Pre-contemplative  ( ) Contemplative - ambivalent about change    (x) Action - ready to set action plan and implement  ( ) Maintenance - has made change and is trying, and or practicing different alternative behaviors    Additional Notes:  Went over home exercise and ways to strengthen core. [unfilled] was in the Workshop with the Exercise Physiologist for 40 minutes. The content was presented via Powerpoint, lecture, and patient participation based format. Motivational interviewing was utilized when needed, to promote change. Patient voiced understanding.     Electronically signed by ADILSON Roger on 2023 at 3:24 PM

## 2023-06-07 ENCOUNTER — HOSPITAL ENCOUNTER (OUTPATIENT)
Dept: CARDIAC REHAB | Age: 73
Setting detail: THERAPIES SERIES
Discharge: HOME | End: 2023-06-07
Payer: MEDICARE

## 2023-06-07 PROCEDURE — G0422 INTENS CARDIAC REHAB W/EXERC: HCPCS

## 2023-06-07 PROCEDURE — G0423 INTENS CARDIAC REHAB NO EXER: HCPCS

## 2023-06-07 NOTE — PROGRESS NOTES
Quinton Glover YOB: 1950    Acct Number: [de-identified]   MRN:  [id number]                             [facility] NUTRITION 1:1 Counseling             Date: 2023       Session # _______   1:1 Counseling Session    GOALS:  Wt management  2. Keeping the sodium content of diet low  James reports making some changes since starting Pritikin ICR including watching sodium intake, eating more fruits and veggies, exercising more    Reported Weight Trends: BMI 29.84  Edema:   Lab Trends: 23: chol 187, HDL 39, LDL 96, Tga 259 (H), A1C 5.9 (pre-diabetes)  Rate Your Plate:     Receptiveness to education/goals:  (x) Agreeable ( ) No Interest   ( ) Refused  Evaluation of education:  ( ) Indicates understanding   ( ) Needs reinforcement     () Unsuccessful     Readiness to change:    ( ) Pre-contemplative   ( ) Contemplative - ambivalent about change    ( ) Action - ready to set action plan and implement   (X) Maintenance - has made change and is trying, and or practicing different alternative behaviors     Exercise Habits:  Chanel Calloway reports on days off he walks 2 miles (2-3 nights/wk), bikes at [employer] Recall:  Breakfast:  Raisin toast (2pcs), black coffee  Lunch:  Tuna fish, 2 pcs wheat bread, apple slices, chips  Dinner:  Ernesto Crumbly (1/2 svg), salad ([employer])  Snacks:  Ice cream before bed  Main Beverages:  diet root beer    Grocery Shopping: both  Meal Prep/Cooking: wife  Dining Out: Osmani Huynh, not frequent    Chanel Calloway was counseled by the Dietitian for 45 minutes. Motivational Interviewing was used to help promote change. Patient voiced understanding.      Electronically signed by Miquel Benitez RD on 2023 at 3:39 PM

## 2023-06-09 ENCOUNTER — HOSPITAL ENCOUNTER (OUTPATIENT)
Dept: CARDIAC REHAB | Age: 73
Setting detail: THERAPIES SERIES
Discharge: HOME | End: 2023-06-09
Payer: MEDICARE

## 2023-06-09 PROCEDURE — G0422 INTENS CARDIAC REHAB W/EXERC: HCPCS

## 2023-06-09 PROCEDURE — G0423 INTENS CARDIAC REHAB NO EXER: HCPCS

## 2023-06-09 NOTE — PROGRESS NOTES
Padmini BASS.:  1950  Acct Number: [de-identified]  MRN:  [id number]                  [facility] COOKING SCHOOL WORKSHOP             Date: 2023        Session # ________   Tracy Cates class covered:    [facility]    [] Adding Flavor - Sodium-Free  [] Fast & Healthy Breakfasts    [] Powerhouse Plant-Based Proteins [] Satisfying Salads and Dressings    [] Simple Sides & Sauces   [] Personalizing Your Plate    [] Delicious Desserts    [] Savory Soups    [] Efficiency Cooking - Meals in a Snap [] Tasty Appetizers & Snacks     [] Comforting Weekend Breakfasts  [] One-Pot Wonders    [x] Fast Evening Meals   [] Easy Entertaining    []  International Cuisine Spotlight on the Blue Zone          Patients were shown how to choose, prep, and cook; substitutions and other options were given. Samples were offered. Recipes were given and questions answered. The patient above was in the [employer] for 35 minutes.       Electronically signed by Jeannie Grissom RD on 2023 at 2:02 PM

## 2023-06-19 ENCOUNTER — HOSPITAL ENCOUNTER (OUTPATIENT)
Dept: CARDIAC REHAB | Age: 73
Setting detail: THERAPIES SERIES
Discharge: HOME | End: 2023-06-19
Payer: MEDICARE

## 2023-06-19 PROCEDURE — G0423 INTENS CARDIAC REHAB NO EXER: HCPCS

## 2023-06-19 PROCEDURE — G0422 INTENS CARDIAC REHAB W/EXERC: HCPCS

## 2023-06-19 NOTE — PROGRESS NOTES
Video Education Report - ICR/CR  Name:  Aba De Jesus     Date:  6/19/2023  MRN: [id number]     Session #:   Session Length: 40 min    Core Videos        []Heart Disease Risk Reduction       []Overview of Pritikin Eating Plan      []Move it          []Calorie Density         [x]Healthy Minds, Bodies, Hearts        []Label Reading - Part 1       []Metabolic Syndrome and Belly Fat        []How Our Thoughts Can Heal Our Hearts   []Dining Out - Part 1      []Biomechanical Limitations  []Facts on Fat        []Hypertension & Heart Disease    []Diseases of Our Time - Focusing on Diabetes   []Body Composition  []Nutrition Action Plan    []Exercise Action Plan      Comments:  Video completed, group discussion

## 2023-06-21 ENCOUNTER — HOSPITAL ENCOUNTER (OUTPATIENT)
Dept: CARDIAC REHAB | Age: 73
Setting detail: THERAPIES SERIES
Discharge: HOME | End: 2023-06-21
Payer: MEDICARE

## 2023-06-21 PROCEDURE — G0422 INTENS CARDIAC REHAB W/EXERC: HCPCS

## 2023-06-21 PROCEDURE — G0423 INTENS CARDIAC REHAB NO EXER: HCPCS

## 2023-06-21 NOTE — PROGRESS NOTES
Frankie BASS.:  1950    Acct Number: [de-identified]   MRN:  [id number]                             [facility] HEALTHY MIND-SET WORKSHOP             Date: 2023        Session #________    Sabino Seip class covered:    []  New Thoughts New Behaviors Workshop:  Patient will learn and practice techniques for developing effective health and lifestyle goals. Patient will be able to effectively apply the goal setting process learned to develop at least one new personal goal.     []  Managing Moods & Relationships Workshop:  Patient will learn how emotional and chronic stress factors can impact their hearts. They will learn healthy ways to handle stress and utilize positive coping mechanisms. In addition, [facility] patient will learn ways to improve communication skills. []  Healthy Sleep for a healthy Heart:  Patients will learn the importance of sleep to overall health, well-being, and quality of life. They will understand the symptoms of, and treatments for, common sleep disorders. Patients will also be able to identify daytime and nighttime behaviors which impact sleep, and they will be able to apply these tools to help manage sleep-related challenges. [x]  Recognizing and Reducing Stress:  Patients will learn about stress and how to recognize stress. Patients will gain insight into the toll that chronic stress takes on their health, both emotionally and physically. Patients will learn and practice a variety of stress management techniques. Patients will be able to effectively apply coping mechanisms in perceived stressful situations. Rose Parisi actively participated and verbalized understanding. Total time in the Healthy Mind-Set class was 60 minutes.     Electronically signed by DHEERAJ Monroy on 2023 at 4:20 PM

## 2023-06-23 ENCOUNTER — HOSPITAL ENCOUNTER (OUTPATIENT)
Dept: CARDIAC REHAB | Age: 73
Setting detail: THERAPIES SERIES
Discharge: HOME | End: 2023-06-23
Payer: MEDICARE

## 2023-06-23 PROCEDURE — G0422 INTENS CARDIAC REHAB W/EXERC: HCPCS

## 2023-06-23 PROCEDURE — G0423 INTENS CARDIAC REHAB NO EXER: HCPCS

## 2023-06-23 NOTE — PROGRESS NOTES
Oli BASS.:  1950  Acct Number: [de-identified]  MRN:  [id number]                  [facility] COOKING SCHOOL WORKSHOP             Date: 2023        Session # ________   Kim Friday class covered:    [address] [facility]    [] Adding Flavor - Sodium-Free  [] Fast & Healthy Breakfasts    [] Powerhouse Plant-Based Proteins [] Satisfying Salads and Dressings    [] Simple Sides & Sauces   [x] Personalizing Your Plate    [] Delicious Desserts    [] Savory Soups    [] Efficiency Cooking - Meals in a Snap [] Tasty Appetizers & Snacks     [] Comforting Weekend Breakfasts  [] One-Pot Wonders    [] Fast Evening Meals   [] Easy Entertaining    []  International Cuisine Spotlight on the Blue Zone          Patients were shown how to choose, prep, and cook; substitutions and other options were given. Samples were offered. Recipes were given and questions answered. The patient above was in the [employer] for 45 minutes.       Electronically signed by Alicia Castillo RD on 2023 at 3:35 PM

## 2023-06-26 ENCOUNTER — HOSPITAL ENCOUNTER (OUTPATIENT)
Dept: CARDIAC REHAB | Age: 73
Setting detail: THERAPIES SERIES
Discharge: HOME | End: 2023-06-26
Payer: MEDICARE

## 2023-06-26 PROCEDURE — G0423 INTENS CARDIAC REHAB NO EXER: HCPCS

## 2023-06-26 PROCEDURE — G0422 INTENS CARDIAC REHAB W/EXERC: HCPCS

## 2023-06-28 ENCOUNTER — HOSPITAL ENCOUNTER (OUTPATIENT)
Dept: CARDIAC REHAB | Age: 73
Setting detail: THERAPIES SERIES
Discharge: HOME | End: 2023-06-28
Payer: MEDICARE

## 2023-06-28 PROCEDURE — G0423 INTENS CARDIAC REHAB NO EXER: HCPCS

## 2023-06-28 PROCEDURE — G0422 INTENS CARDIAC REHAB W/EXERC: HCPCS

## 2023-06-29 RX ORDER — AMLODIPINE BESYLATE 5 MG/1
5 TABLET ORAL DAILY
Qty: 180 TABLET | Refills: 1 | Status: SHIPPED | OUTPATIENT
Start: 2023-06-29

## 2023-06-30 ENCOUNTER — HOSPITAL ENCOUNTER (OUTPATIENT)
Dept: CARDIAC REHAB | Age: 73
Setting detail: THERAPIES SERIES
Discharge: HOME | End: 2023-06-30
Payer: MEDICARE

## 2023-06-30 ENCOUNTER — OFFICE VISIT (OUTPATIENT)
Dept: NEUROLOGY | Age: 73
End: 2023-06-30
Payer: MEDICARE

## 2023-06-30 VITALS
WEIGHT: 207 LBS | HEART RATE: 53 BPM | DIASTOLIC BLOOD PRESSURE: 68 MMHG | SYSTOLIC BLOOD PRESSURE: 134 MMHG | HEIGHT: 70 IN | BODY MASS INDEX: 29.63 KG/M2

## 2023-06-30 DIAGNOSIS — R55 NEAR SYNCOPE: ICD-10-CM

## 2023-06-30 DIAGNOSIS — Z86.73 HISTORY OF STROKE: ICD-10-CM

## 2023-06-30 DIAGNOSIS — I63.89 OTHER CEREBRAL INFARCTION (HCC): ICD-10-CM

## 2023-06-30 DIAGNOSIS — R42 DIZZINESS: Primary | ICD-10-CM

## 2023-06-30 PROCEDURE — 99213 OFFICE O/P EST LOW 20 MIN: CPT | Performed by: NURSE PRACTITIONER

## 2023-06-30 PROCEDURE — 3017F COLORECTAL CA SCREEN DOC REV: CPT | Performed by: NURSE PRACTITIONER

## 2023-06-30 PROCEDURE — G8417 CALC BMI ABV UP PARAM F/U: HCPCS | Performed by: NURSE PRACTITIONER

## 2023-06-30 PROCEDURE — G0423 INTENS CARDIAC REHAB NO EXER: HCPCS

## 2023-06-30 PROCEDURE — G8427 DOCREV CUR MEDS BY ELIG CLIN: HCPCS | Performed by: NURSE PRACTITIONER

## 2023-06-30 PROCEDURE — 3078F DIAST BP <80 MM HG: CPT | Performed by: NURSE PRACTITIONER

## 2023-06-30 PROCEDURE — 1123F ACP DISCUSS/DSCN MKR DOCD: CPT | Performed by: NURSE PRACTITIONER

## 2023-06-30 PROCEDURE — G0422 INTENS CARDIAC REHAB W/EXERC: HCPCS

## 2023-06-30 PROCEDURE — 1036F TOBACCO NON-USER: CPT | Performed by: NURSE PRACTITIONER

## 2023-06-30 PROCEDURE — 3075F SYST BP GE 130 - 139MM HG: CPT | Performed by: NURSE PRACTITIONER

## 2023-06-30 RX ORDER — NAPROXEN 500 MG/1
500 TABLET ORAL 2 TIMES DAILY WITH MEALS
COMMUNITY

## 2023-07-03 ENCOUNTER — APPOINTMENT (OUTPATIENT)
Dept: CARDIAC REHAB | Age: 73
End: 2023-07-03
Payer: MEDICARE

## 2023-07-03 ENCOUNTER — HOSPITAL ENCOUNTER (OUTPATIENT)
Dept: CARDIAC REHAB | Age: 73
Setting detail: THERAPIES SERIES
End: 2023-07-03
Payer: MEDICARE

## 2023-07-03 ENCOUNTER — TELEPHONE (OUTPATIENT)
Dept: NEUROLOGY | Age: 73
End: 2023-07-03

## 2023-07-03 LAB — HOMOCYSTINE, SERUM: 16 UMOL/L

## 2023-07-03 NOTE — TELEPHONE ENCOUNTER
----- Message from BASHIR Jordan CNP sent at 7/3/2023  7:55 AM EDT -----  Please let patient know his homocystine level is elevated=16.   He needs to start on folic acid 1 mg daily, will send script to [address] [place] [address] [facility]

## 2023-07-05 ENCOUNTER — HOSPITAL ENCOUNTER (OUTPATIENT)
Dept: CARDIAC REHAB | Age: 73
Setting detail: THERAPIES SERIES
Discharge: HOME | End: 2023-07-05
Payer: MEDICARE

## 2023-07-05 PROCEDURE — G0423 INTENS CARDIAC REHAB NO EXER: HCPCS

## 2023-07-05 PROCEDURE — G0422 INTENS CARDIAC REHAB W/EXERC: HCPCS

## 2023-07-05 NOTE — PROGRESS NOTES
Kitty BASS.:  1950    Acct Number: [de-identified]   MRN:  [id number]                             [facility] NUTRITION WORKSHOP             Date: 2023        Session # _______    Josh Mcghee class covered:    [x]   Fueling a Healthy Body  []   Mindful Eating  []   Targeting Nutrition Priorities  []   Dining Out :  Making the Most of a Menu  []   Label Reading    Readiness to change:    []  Pre-contemplative   []  Contemplative - ambivalent about change    []  Action - ready to set action plan and implement   [x]  Maintenance - has made change and is trying, and or practicing different alternative         behaviors     Isabel Nina was in the Workshop with the Dietitian for 55 minutes. The content was presented via Powerpoint, lecture, and patient participation based format. Motivational interviewing was utilized when needed, to promote change. Patient voiced understanding.     Electronically signed by Alex Griffiths RD on 2023 at 4:06 PM

## 2023-07-07 ENCOUNTER — HOSPITAL ENCOUNTER (OUTPATIENT)
Dept: CARDIAC REHAB | Age: 73
Setting detail: THERAPIES SERIES
Discharge: HOME | End: 2023-07-07
Payer: MEDICARE

## 2023-07-07 PROCEDURE — G0423 INTENS CARDIAC REHAB NO EXER: HCPCS

## 2023-07-07 PROCEDURE — G0422 INTENS CARDIAC REHAB W/EXERC: HCPCS

## 2023-07-07 NOTE — PROGRESS NOTES
Ileana Looma   :  1950  Acct Number: [de-identified]  MRN:  [id number]                  [facility] COOKING SCHOOL WORKSHOP             Date: 2023        Session # ________   Katarzyna Fret class covered:    [address]    [] Adding Flavor - Sodium-Free  [x] Fast & Healthy Breakfasts    [] Powerhouse Plant-Based Proteins [] Satisfying Salads and Dressings    [] Simple Sides & Sauces   [] Personalizing Your Plate    [] Delicious Desserts    [] Savory Soups    [] Efficiency Cooking - Meals in a Snap [] Tasty Appetizers & Snacks     [] Comforting Weekend Breakfasts  [] One-Pot Wonders    [] Fast Evening Meals   [] Easy Entertaining    []  International Cuisine Spotlight on the Blue Zone          Patients were shown how to choose, prep, and cook; substitutions and other options were given. Samples were offered. Recipes were given and questions answered. The patient above was in the [employer] for 40 minutes.       Electronically signed by Lindsay Ravi RD on 2023 at 3:22 PM

## 2023-07-10 ENCOUNTER — TELEPHONE (OUTPATIENT)
Dept: CARDIOLOGY CLINIC | Age: 73
End: 2023-07-10

## 2023-07-10 ENCOUNTER — HOSPITAL ENCOUNTER (OUTPATIENT)
Dept: CARDIAC REHAB | Age: 73
Setting detail: THERAPIES SERIES
Discharge: HOME | End: 2023-07-10
Payer: MEDICARE

## 2023-07-10 PROCEDURE — G0422 INTENS CARDIAC REHAB W/EXERC: HCPCS

## 2023-07-10 PROCEDURE — G0423 INTENS CARDIAC REHAB NO EXER: HCPCS

## 2023-07-10 RX ORDER — AMLODIPINE BESYLATE 5 MG/1
5 TABLET ORAL 2 TIMES DAILY
Qty: 180 TABLET | Refills: 3 | Status: SHIPPED | OUTPATIENT
Start: 2023-07-10

## 2023-07-10 NOTE — PROGRESS NOTES
Video Education Report - ICR/CR  Name:  Raza Peralta     Date:  7/10/2023  MRN: [id number]     Session #:    Session Length: 40 min    Core Videos        []Heart Disease Risk Reduction       []Overview of Pritikin Eating Plan      []Move it          []Calorie Density         []Healthy Minds, Bodies, Hearts        []Label Reading - Part 1       [x]Metabolic Syndrome and Belly Fat        []How Our Thoughts Can Heal Our Hearts   []Dining Out - Part 1      []Biomechanical Limitations  []Facts on Fat        []Hypertension & Heart Disease    []Diseases of Our Time - Focusing on Diabetes   []Body Composition  []Nutrition Action Plan    []Exercise Action Plan    Exercise      Healthy Mind-Set  []Improving Performance    []Smoking Cessation    []Introduction to [address]  []Aging-Enhancing the Quality of Your Life  []Becoming a Pritikin   []Biology of Weight Control    []Cooking Breakfasts   []Decoding Lab Results    and Snacks  []Diseases of Our Time - Overview   []Cooking Dinner and   []Sleep Disorders     Sides  []Targeting Your Nutrition Priorities   []Healthy Salads &   Dressings  Nutrition      []Cooking Soups and   []Dining Out - Part 2     Desserts  []Fueling a Healthy Body   []Menu Workshop     Overview  []Planning Your Eating Strategy   []The Pritikin Solution  []Vitamins and Minerals    Comments:  Video completed, group discussion

## 2023-07-10 NOTE — TELEPHONE ENCOUNTER
Patient's spouse is calling to clarify dosage on patient's medication, norvasc(amlodipine) 5 mg tablets. She said that he had been told to increase the dosage but when they went to  the newest script, the instructions were the same as before and she wants to clarify what he should be taking.  Please advise

## 2023-07-12 ENCOUNTER — HOSPITAL ENCOUNTER (OUTPATIENT)
Dept: CARDIAC REHAB | Age: 73
Setting detail: THERAPIES SERIES
Discharge: HOME | End: 2023-07-12
Payer: MEDICARE

## 2023-07-12 PROCEDURE — G0423 INTENS CARDIAC REHAB NO EXER: HCPCS

## 2023-07-12 PROCEDURE — G0422 INTENS CARDIAC REHAB W/EXERC: HCPCS

## 2023-07-12 NOTE — PLAN OF CARE
[address] [facility] Facility-Based Program  Individualized Cardiac Treatment Plan    Patient Name:  Nolberto Putnam  :  1950  Age:  68 y.o. MRN:  [id number]  Diagnosis: PCI to LAD  Date of Event: 23   Physician:  Raul Wall CNP/Shaneka Moise Office Visit:  10/30/23  Date Entered Program: 23  Risk Stratifications: [] Low [] Intermediate [] High  Allergies:    Allergies   Allergen Reactions    Crestor [Rosuvastatin]      Muscle aches    Tape Ruthanna Martín Tape] Rash     Blisters and red       Individual Cardiac Treatment Plan -EXERCISE  INITIAL 30 DAY 60 DAY 90  DAY FINAL DAY   EXERCISE  ASSESSMENT/PLAN EXERCISE  REASSESSMENT EXERCISE   REASSESSMENT EXERCISE   REASSESSMENT EXERCISE   REASSESSMENT EXERCISE  DISCHARGE/FOLLOW-UP   Date: 23 Date: 2023 Date: 23 Date: Date: Date:   Session #1   Total Session #24   First Exercise Session:23   Total Session #  Total Session #  Total Session #  Total Session #   Last Exercise Session:     Stages of Change Stages of Change Stages of Change Stages of Change Stages of Change Stages of Change   [] Pre Contemplation  [x] Contemplation  [] Preparation  [] Action  [] Maintenance  [] Relapse [] Pre Contemplation  [x] Contemplation  [] Preparation  [] Action  [] Maintenance  [] Relapse [] Pre Contemplation  [x] Contemplation  [] Preparation  [] Action  [] Maintenance  [] Relapse [] Pre Contemplation  [] Contemplation  [] Preparation  [] Action  [] Maintenance  [] Relapse [] Pre Contemplation  [] Contemplation  [] Preparation  [] Action  [] Maintenance  [] Relapse [] Pre Contemplation  [] Contemplation  [] Preparation  [] Action  [] Maintenance  [] Relapse   EXERCISE ASSESSMENT EXERCISE ASSESSMENT EXERCISE ASSESSMENT EXERCISE ASSESSMENT EXERCISE ASSESSMENT EXERCISE ASSESSMENT   6 Min Walk Test  Distance walked:   0.21 miles  1108 ft.  2.61 METs  Max HR:80 BPM      RPE:  11    THR:

## 2023-07-14 ENCOUNTER — HOSPITAL ENCOUNTER (OUTPATIENT)
Dept: CARDIAC REHAB | Age: 73
Setting detail: THERAPIES SERIES
Discharge: HOME | End: 2023-07-14
Payer: MEDICARE

## 2023-07-14 PROCEDURE — G0423 INTENS CARDIAC REHAB NO EXER: HCPCS

## 2023-07-14 PROCEDURE — G0422 INTENS CARDIAC REHAB W/EXERC: HCPCS

## 2023-07-14 NOTE — PROGRESS NOTES
Video Education Report - ICR/CR  Name:  Luis Lowe     Date:  7/14/2023  MRN: [id number]     Session #:  24  Session Length: 40 min      Exercise      Healthy Mind-Set  []Improving Performance    []Smoking Cessation    []Introduction to [facility]  []Aging-Enhancing the Quality of Your Life  []Becoming a Pritikin   []Biology of Weight Control    []Cooking Breakfasts   []Decoding Lab Results    and Snacks  []Diseases of Our Time - Overview   []Cooking Dinner and   []Sleep Disorders     Sides  []Targeting Your Nutrition Priorities   [x]Healthy Salads &   Dressings  Nutrition      []Cooking Soups and   []Dining Out - Part 2     Desserts  []Fueling a Healthy Body   []Menu Workshop     Overview  []Planning Your Eating Strategy   []The Pritikin Solution  []Vitamins and Minerals    Comments:  Video completed, group discussion

## 2023-07-17 ENCOUNTER — APPOINTMENT (OUTPATIENT)
Dept: CARDIAC REHAB | Age: 73
End: 2023-07-17
Payer: MEDICARE

## 2023-07-18 ENCOUNTER — HOSPITAL ENCOUNTER (OUTPATIENT)
Dept: CARDIAC REHAB | Age: 73
Setting detail: THERAPIES SERIES
Discharge: HOME | End: 2023-07-18
Payer: MEDICARE

## 2023-07-18 PROCEDURE — G0423 INTENS CARDIAC REHAB NO EXER: HCPCS

## 2023-07-18 PROCEDURE — G0422 INTENS CARDIAC REHAB W/EXERC: HCPCS

## 2023-07-18 NOTE — PROGRESS NOTES
Video Education Report - ICR/CR  Name:  Kitty Dixon     Date:  7/18/2023  MRN: [id number]     Session #:    Session Length: 40 min    Core Videos        []Heart Disease Risk Reduction       []Overview of Pritikin Eating Plan      []Move it          []Calorie Density         []Healthy Minds, Bodies, Hearts        []Label Reading - Part 1       []Metabolic Syndrome and Belly Fat        []How Our Thoughts Can Heal Our Hearts   [x]Dining Out - Part 1      []Biomechanical Limitations  []Facts on Fat        []Hypertension & Heart Disease    []Diseases of Our Time - Focusing on Diabetes   []Body Composition  []Nutrition Action Plan    []Exercise Action Plan        Comments:  Video completed, group discussion

## 2023-07-19 ENCOUNTER — HOSPITAL ENCOUNTER (OUTPATIENT)
Dept: CARDIAC REHAB | Age: 73
Setting detail: THERAPIES SERIES
Discharge: HOME | End: 2023-07-19
Payer: MEDICARE

## 2023-07-19 PROCEDURE — G0423 INTENS CARDIAC REHAB NO EXER: HCPCS

## 2023-07-19 PROCEDURE — G0422 INTENS CARDIAC REHAB W/EXERC: HCPCS

## 2023-07-19 NOTE — PROGRESS NOTES
Video Education Report - ICR/CR  Name:  Ramiro Vaughan     Date:  7/19/2023  MRN: [id number]     Session #:    Session Length: 40 min    Core Videos        []Heart Disease Risk Reduction       []Overview of Pritikin Eating Plan      []Move it          []Calorie Density         []Healthy Minds, Bodies, Hearts        []Label Reading - Part 1       []Metabolic Syndrome and Belly Fat        []How Our Thoughts Can Heal Our Hearts   []Dining Out - Part 1      []Biomechanical Limitations  []Facts on Fat        [x]Hypertension & Heart Disease    []Diseases of Our Time - Focusing on Diabetes   []Body Composition  []Nutrition Action Plan    []Exercise Action Plan    Exercise      Healthy Mind-Set  []Improving Performance    []Smoking Cessation    []Introduction to [address]  []Aging-Enhancing the Quality of Your Life  []Becoming a Pritikin   []Biology of Weight Control    []Cooking Breakfasts   []Decoding Lab Results    and Snacks  []Diseases of Our Time - Overview   []Cooking Dinner and   []Sleep Disorders     Sides  []Targeting Your Nutrition Priorities   []Healthy Salads &   Dressings  Nutrition      []Cooking Soups and   []Dining Out - Part 2     Desserts  []Fueling a Healthy Body   []Menu Workshop     Overview  []Planning Your Eating Strategy   []The Pritikin Solution  []Vitamins and Minerals    Comments:  Video completed, group discussion

## 2023-07-20 ENCOUNTER — OFFICE VISIT (OUTPATIENT)
Dept: FAMILY MEDICINE CLINIC | Age: 73
End: 2023-07-20
Payer: MEDICARE

## 2023-07-20 VITALS
BODY MASS INDEX: 29.59 KG/M2 | RESPIRATION RATE: 16 BRPM | DIASTOLIC BLOOD PRESSURE: 66 MMHG | WEIGHT: 206.7 LBS | HEART RATE: 60 BPM | HEIGHT: 70 IN | SYSTOLIC BLOOD PRESSURE: 122 MMHG

## 2023-07-20 DIAGNOSIS — M62.08 DIASTASIS RECTI: ICD-10-CM

## 2023-07-20 DIAGNOSIS — K42.9 UMBILICAL HERNIA WITHOUT OBSTRUCTION AND WITHOUT GANGRENE: Primary | ICD-10-CM

## 2023-07-20 DIAGNOSIS — I10 ESSENTIAL HYPERTENSION: ICD-10-CM

## 2023-07-20 DIAGNOSIS — I25.110 CORONARY ARTERY DISEASE INVOLVING NATIVE CORONARY ARTERY OF NATIVE HEART WITH UNSTABLE ANGINA PECTORIS (HCC): ICD-10-CM

## 2023-07-20 PROCEDURE — 3074F SYST BP LT 130 MM HG: CPT | Performed by: NURSE PRACTITIONER

## 2023-07-20 PROCEDURE — 1123F ACP DISCUSS/DSCN MKR DOCD: CPT | Performed by: NURSE PRACTITIONER

## 2023-07-20 PROCEDURE — 1036F TOBACCO NON-USER: CPT | Performed by: NURSE PRACTITIONER

## 2023-07-20 PROCEDURE — G8417 CALC BMI ABV UP PARAM F/U: HCPCS | Performed by: NURSE PRACTITIONER

## 2023-07-20 PROCEDURE — G8427 DOCREV CUR MEDS BY ELIG CLIN: HCPCS | Performed by: NURSE PRACTITIONER

## 2023-07-20 PROCEDURE — 3078F DIAST BP <80 MM HG: CPT | Performed by: NURSE PRACTITIONER

## 2023-07-20 PROCEDURE — 3017F COLORECTAL CA SCREEN DOC REV: CPT | Performed by: NURSE PRACTITIONER

## 2023-07-20 PROCEDURE — 99213 OFFICE O/P EST LOW 20 MIN: CPT | Performed by: NURSE PRACTITIONER

## 2023-07-20 ASSESSMENT — ENCOUNTER SYMPTOMS
ABDOMINAL PAIN: 0
SHORTNESS OF BREATH: 0
NAUSEA: 0
COUGH: 0

## 2023-07-20 NOTE — PROGRESS NOTES
Wong Lambert (1950) 68 y.o. male here for evaluation of the following chief complaint(s):      HPI:  Chief Complaint   Patient presents with    Other     Bulge at belly button  Blood pressure is increasing when doing cardiac rehab in the 200s       Noticed it x 1 year with bulge in belly button. No pain. No redness. Vitals:    07/20/23 1044   BP: 122/66   Pulse: 60   Resp: 16       BP elevated during exercises at cardiac. Up to 200. Denies CP or SOB with exercises. BP wnl. On Atenoolol 12.5 mg BID, Norvasc 5 mg and Diovan 160 mg. Patient Active Problem List   Diagnosis    Coronary artery disease involving native coronary artery of native heart with unstable angina pectoris (HCC)    Hypertension    Hyperlipidemia    Sleep apnea    Hemorrhoids    GERD (gastroesophageal reflux disease)    Pharyngitis, acute    Back pain    Urinary frequency    Hematuria, undiagnosed cause    OAB (overactive bladder)    BPH associated with nocturia    Tracheobronchitis    Cervical muscle pain    Acute cervical myofascial strain, secondary to fall    DDD (degenerative disc disease), cervical    Skin tag, buttocks. Chest pain    Angina effort    Dyslipidemia    S/P angioplasty with stent    Erectile dysfunction due to arterial insufficiency    Primary insomnia    Medication monitoring encounter    Bilateral tinnitus    Acquired hypothyroidism    Atrial fibrillation with RVR (HCC)    Atrial flutter (HCC)    PAD (peripheral artery disease) (HCC)    Unstable angina (HCC)    Obesity (BMI 30.0-34. 9)    Stented coronary artery    Premature atrial complexes    Type 2 diabetes mellitus    Syncope and collapse       SUBJECTIVE/OBJECTIVE:  Review of Systems   Constitutional:  Negative for chills and fever. HENT: Negative. Respiratory:  Negative for cough and shortness of breath. Cardiovascular:  Negative for chest pain. Gastrointestinal:  Negative for abdominal pain and nausea. Skin:  Negative for rash.

## 2023-07-21 ENCOUNTER — HOSPITAL ENCOUNTER (OUTPATIENT)
Dept: CARDIAC REHAB | Age: 73
Setting detail: THERAPIES SERIES
Discharge: HOME | End: 2023-07-21
Payer: MEDICARE

## 2023-07-21 PROCEDURE — G0422 INTENS CARDIAC REHAB W/EXERC: HCPCS

## 2023-07-21 PROCEDURE — G0423 INTENS CARDIAC REHAB NO EXER: HCPCS

## 2023-07-21 NOTE — PROGRESS NOTES
Radha BASS.:  1950  Acct Number: [de-identified]  MRN:  [id number]                  [facility] COOKING SCHOOL WORKSHOP             Date: 2023        Session # ________   Dillon Mason class covered:    [address]    [] Adding Flavor - Sodium-Free  [] Fast & Healthy Breakfasts    [] Powerhouse Plant-Based Proteins [] Satisfying Salads and Dressings    [] Simple Sides & Sauces   [] Personalizing Your Plate    [] Delicious Desserts    [] Savory Soups    [] Efficiency Cooking - Meals in a Snap [] Tasty Appetizers & Snacks     [] Comforting Weekend Breakfasts  [] One-Pot Wonders    [x] Fast Evening Meals   [] Easy Entertaining    []  International Cuisine Spotlight on the Blue [place]          Patients were shown how to choose, prep, and cook; substitutions and other options were given. Samples were offered. Recipes were given and questions answered. The patient above was in the [employer] for 40 minutes.       Electronically signed by ADILSON Che on 2023 at 2:24 PM

## 2023-07-24 ENCOUNTER — HOSPITAL ENCOUNTER (OUTPATIENT)
Dept: CARDIAC REHAB | Age: 73
Setting detail: THERAPIES SERIES
Discharge: HOME | End: 2023-07-24
Payer: MEDICARE

## 2023-07-24 PROCEDURE — G0423 INTENS CARDIAC REHAB NO EXER: HCPCS

## 2023-07-24 PROCEDURE — G0422 INTENS CARDIAC REHAB W/EXERC: HCPCS

## 2023-07-24 NOTE — PROGRESS NOTES
Video Education Report - ICR/CR  Name:  Vivienne Jenkins     Date:  7/24/2023  MRN: [id number]     Session #:  29  Session Length: 40 min    Core Videos        []Heart Disease Risk Reduction       []Overview of Pritikin Eating Plan      []Move it          []Calorie Density         []Healthy Minds, Bodies, Hearts        []Label Reading - Part 1       []Metabolic Syndrome and Belly Fat        []How Our Thoughts Can Heal Our Hearts   []Dining Out - Part 1      [x]Biomechanical Limitations  []Facts on Fat        []Hypertension & Heart Disease    []Diseases of Our Time - Focusing on Diabetes   []Body Composition  []Nutrition Action Plan    []Exercise Action Plan        Comments:  Video completed, group discussion

## 2023-07-26 ENCOUNTER — HOSPITAL ENCOUNTER (OUTPATIENT)
Dept: CARDIAC REHAB | Age: 73
Setting detail: THERAPIES SERIES
Discharge: HOME | End: 2023-07-26
Payer: MEDICARE

## 2023-07-26 PROCEDURE — G0422 INTENS CARDIAC REHAB W/EXERC: HCPCS

## 2023-07-26 PROCEDURE — G0423 INTENS CARDIAC REHAB NO EXER: HCPCS

## 2023-07-26 NOTE — PROGRESS NOTES
Aj BASS.:  1950    Acct Number: [de-identified]   MRN:  [id number]                             [facility] NUTRITION WORKSHOP             Date: 2023        Session # _______    Adair Guzman class covered:    []   Fueling a Healthy Body  [x]   Mindful Eating  []   Targeting Nutrition Priorities  []   Dining Out :  Making the Most of a Menu  []   Label Reading    Readiness to change:    []  Pre-contemplative   []  Contemplative - ambivalent about change    []  Action - ready to set action plan and implement   [x]  Maintenance - has made change and is trying, and or practicing different alternative         behaviors     [place] Thapa was in the Workshop with the Dietitian for 55 minutes. The content was presented via Powerpoint, lecture, and patient participation based format. Motivational interviewing was utilized when needed, to promote change. Patient voiced understanding.     Electronically signed by Nakia Hurtado RD on 2023 at 3:56 PM

## 2023-07-28 ENCOUNTER — HOSPITAL ENCOUNTER (OUTPATIENT)
Dept: CARDIAC REHAB | Age: 73
Setting detail: THERAPIES SERIES
Discharge: HOME | End: 2023-07-28
Payer: MEDICARE

## 2023-07-28 PROCEDURE — G0422 INTENS CARDIAC REHAB W/EXERC: HCPCS

## 2023-07-28 PROCEDURE — G0423 INTENS CARDIAC REHAB NO EXER: HCPCS

## 2023-07-28 NOTE — PROGRESS NOTES
Rey Barreto YOB: 1950  Acct Number: [de-identified]  MRN:  [id number]                  [facility] COOKING SCHOOL WORKSHOP             Date: 2023        Session # ________   Servando Hogue class covered:    [address]    [] Adding Flavor - Sodium-Free  [] Fast & Healthy Breakfasts    [] Powerhouse Plant-Based Proteins [] Satisfying Salads and Dressings    [x] Simple Sides & Sauces   [] Personalizing Your Plate    [] Delicious Desserts    [] Savory Soups    [] Efficiency Cooking - Meals in a Snap [] Tasty Appetizers & Snacks     [] Comforting Weekend Breakfasts  [] One-Pot Wonders    [] Fast Evening Meals   [] Easy Entertaining    []  International Cuisine Spotlight on the Blue Zone          Patients were shown how to choose, prep, and cook; substitutions and other options were given. Samples were offered. Recipes were given and questions answered. The patient above was in the [employer] for 45 minutes.       Electronically signed by Ramya Hill RD on 2023 at 2:56 PM

## 2023-07-31 ENCOUNTER — HOSPITAL ENCOUNTER (OUTPATIENT)
Dept: CARDIAC REHAB | Age: 73
Setting detail: THERAPIES SERIES
Discharge: HOME | End: 2023-07-31
Payer: MEDICARE

## 2023-07-31 PROCEDURE — G0423 INTENS CARDIAC REHAB NO EXER: HCPCS

## 2023-07-31 PROCEDURE — G0422 INTENS CARDIAC REHAB W/EXERC: HCPCS

## 2023-07-31 NOTE — PROGRESS NOTES
Video Education Report - ICR/CR  Name:  Yoly Mo     Date:  7/31/2023  MRN: [id number]     Session #:    Session Length: 40 min    Core Videos        []Heart Disease Risk Reduction       []Overview of Pritikin Eating Plan      []Move it          []Calorie Density         []Healthy Minds, Bodies, Hearts        []Label Reading - Part 1       []Metabolic Syndrome and Belly Fat        []How Our Thoughts Can Heal Our Hearts   []Dining Out - Part 1      []Biomechanical Limitations  [x]Facts on Fat        []Hypertension & Heart Disease    []Diseases of Our Time - Focusing on Diabetes   []Body Composition  []Nutrition Action Plan    []Exercise Action Plan      Comments:  Video completed, group discussion

## 2023-08-02 ENCOUNTER — HOSPITAL ENCOUNTER (OUTPATIENT)
Dept: CARDIAC REHAB | Age: 73
Setting detail: THERAPIES SERIES
Discharge: HOME | End: 2023-08-02
Payer: MEDICARE

## 2023-08-02 PROCEDURE — G0422 INTENS CARDIAC REHAB W/EXERC: HCPCS

## 2023-08-02 PROCEDURE — G0423 INTENS CARDIAC REHAB NO EXER: HCPCS

## 2023-08-02 RX ORDER — VALSARTAN 160 MG/1
TABLET ORAL
Qty: 180 TABLET | Refills: 0 | Status: SHIPPED | OUTPATIENT
Start: 2023-08-02

## 2023-08-02 NOTE — PROGRESS NOTES
Yoly BASS.:  1950    Acct Number: [de-identified]   MRN:  [id number]                             [facility] HEALTHY MIND-SET WORKSHOP             Date: 2023        Session #________    [employer] class covered:    []  New Thoughts New Behaviors Workshop:  Patient will learn and practice techniques for developing effective health and lifestyle goals. Patient will be able to effectively apply the goal setting process learned to develop at least one new personal goal.     [x]  Managing Moods & Relationships Workshop:  Patient will learn how emotional and chronic stress factors can impact their hearts. They will learn healthy ways to handle stress and utilize positive coping mechanisms. In addition, [facility] patient will learn ways to improve communication skills. []  Healthy Sleep for a healthy Heart:  Patients will learn the importance of sleep to overall health, well-being, and quality of life. They will understand the symptoms of, and treatments for, common sleep disorders. Patients will also be able to identify daytime and nighttime behaviors which impact sleep, and they will be able to apply these tools to help manage sleep-related challenges. []  Recognizing and Reducing Stress:  Patients will learn about stress and how to recognize stress. Patients will gain insight into the toll that chronic stress takes on their health, both emotionally and physically. Patients will learn and practice a variety of stress management techniques. Patients will be able to effectively apply coping mechanisms in perceived stressful situations. Jeffy Bae actively participated and verbalized understanding. Total time in the Healthy Mind-Set class was 60 minutes.     Electronically signed by DHEERAJ Da Silva on 2023 at 4:22 PM

## 2023-08-03 ENCOUNTER — TELEPHONE (OUTPATIENT)
Dept: FAMILY MEDICINE CLINIC | Age: 73
End: 2023-08-03

## 2023-08-03 DIAGNOSIS — M10.9 ACUTE GOUT, UNSPECIFIED CAUSE, UNSPECIFIED SITE: Primary | ICD-10-CM

## 2023-08-03 RX ORDER — COLCHICINE 0.6 MG/1
TABLET ORAL
Qty: 3 TABLET | Refills: 0 | Status: SHIPPED | OUTPATIENT
Start: 2023-08-03

## 2023-08-03 RX ORDER — PREDNISONE 50 MG/1
50 TABLET ORAL DAILY
Qty: 3 TABLET | Refills: 0 | Status: SHIPPED | OUTPATIENT
Start: 2023-08-03 | End: 2023-08-06

## 2023-08-03 NOTE — TELEPHONE ENCOUNTER
----- Message from Ted Chow sent at 8/3/2023  8:09 AM EDT -----  Subject: Message to Provider    QUESTIONS  Information for Provider? Patient is having another gout flare up and is   wondering if Noaderrick Dumont can call something in for him. [facility] [address] [employer] [address].  ---------------------------------------------------------------------------  --------------  Mar ROLLINS  [phone]; OK to leave message on voicemail  ---------------------------------------------------------------------------  --------------  SCRIPT ANSWERS  Relationship to Patient?  Self

## 2023-08-04 ENCOUNTER — HOSPITAL ENCOUNTER (OUTPATIENT)
Dept: CARDIAC REHAB | Age: 73
Setting detail: THERAPIES SERIES
Discharge: HOME | End: 2023-08-04
Payer: MEDICARE

## 2023-08-04 PROCEDURE — G0422 INTENS CARDIAC REHAB W/EXERC: HCPCS

## 2023-08-04 PROCEDURE — G0423 INTENS CARDIAC REHAB NO EXER: HCPCS

## 2023-08-04 NOTE — PROGRESS NOTES
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             Date: 2023        Session # ________   Reuben Laser class covered:    [address]    [] Adding Flavor - Sodium-Free  [] Fast & Healthy Breakfasts    [] Powerhouse Plant-Based Proteins [] Satisfying Salads and Dressings    [] Simple Sides & Sauces   [] Personalizing Your Plate    [] Delicious Desserts    [] Savory Soups    [] Efficiency Cooking - Meals in a Snap [] Tasty Appetizers & Snacks     [] Comforting Weekend Breakfasts  [] One-Pot Wonders    [] Fast Evening Meals   [] Easy Entertaining    [x]  International Cuisine Spotlight on the [place]          Patients were shown how to choose, prep, and cook; substitutions and other options were given. Samples were offered. Recipes were given and questions answered. The patient above was in the [employer] for 50 minutes.       Electronically signed by David Arredondo RD on 2023 at 3:46 PM

## 2023-08-07 ENCOUNTER — HOSPITAL ENCOUNTER (OUTPATIENT)
Dept: CARDIAC REHAB | Age: 73
Setting detail: THERAPIES SERIES
Discharge: HOME | End: 2023-08-07
Payer: MEDICARE

## 2023-08-07 PROCEDURE — G0422 INTENS CARDIAC REHAB W/EXERC: HCPCS

## 2023-08-07 PROCEDURE — G0423 INTENS CARDIAC REHAB NO EXER: HCPCS

## 2023-08-07 NOTE — PROGRESS NOTES
Video Education Report - ICR/CR  Name:  Vivienne Jenkins     Date:  8/7/2023  MRN: [id number]     Session #:    Session Length: 40 min      Exercise      Healthy Mind-Set  []Improving Performance    []Smoking Cessation    [x]Introduction to [facility]  []Aging-Enhancing the Quality of Your Life  []Becoming a Pritikin   []Biology of Weight Control    []Cooking Breakfasts   []Decoding Lab Results    and Snacks  []Diseases of Our Time - Overview   []Cooking Dinner and   []Sleep Disorders     Sides  []Targeting Your Nutrition Priorities   []Healthy Salads &   Dressings  Nutrition      []Cooking Soups and   []Dining Out - Part 2     Desserts  []Fueling a Healthy Body   []Menu Workshop     Overview  []Planning Your Eating Strategy   []The Pritikin Solution  []Vitamins and Minerals    Comments:  Video completed, group discussion

## 2023-08-09 ENCOUNTER — HOSPITAL ENCOUNTER (OUTPATIENT)
Dept: CARDIAC REHAB | Age: 73
Setting detail: THERAPIES SERIES
Discharge: HOME | End: 2023-08-09
Payer: MEDICARE

## 2023-08-09 ENCOUNTER — APPOINTMENT (OUTPATIENT)
Dept: CARDIAC REHAB | Age: 73
End: 2023-08-09
Payer: MEDICARE

## 2023-08-09 PROCEDURE — G0422 INTENS CARDIAC REHAB W/EXERC: HCPCS

## 2023-08-09 PROCEDURE — G0423 INTENS CARDIAC REHAB NO EXER: HCPCS

## 2023-08-09 NOTE — PROGRESS NOTES
Video Education Report - ICR/CR  Name:  Ramiro Vaughan     Date:  8/9/2023  MRN: [id number]     Session #:  28  Session Length: 40 min    Core Videos        []Heart Disease Risk Reduction       []Overview of Pritikin Eating Plan      []Move it          []Calorie Density         []Healthy Minds, Bodies, Hearts        []Label Reading - Part 1       []Metabolic Syndrome and Belly Fat        []How Our Thoughts Can Heal Our Hearts   []Dining Out - Part 1      []Biomechanical Limitations  []Facts on Fat        []Hypertension & Heart Disease    []Diseases of Our Time - Focusing on Diabetes   [x]Body Composition  []Nutrition Action Plan    []Exercise Action Plan      Comments:  Video completed, group discussion

## 2023-08-10 VITALS — HEIGHT: 70 IN | WEIGHT: 205.38 LBS | BODY MASS INDEX: 29.4 KG/M2

## 2023-08-10 ASSESSMENT — PATIENT HEALTH QUESTIONNAIRE - PHQ9
SUM OF ALL RESPONSES TO PHQ QUESTIONS 1-9: 1
9. THOUGHTS THAT YOU WOULD BE BETTER OFF DEAD, OR OF HURTING YOURSELF: 0
6. FEELING BAD ABOUT YOURSELF - OR THAT YOU ARE A FAILURE OR HAVE LET YOURSELF OR YOUR FAMILY DOWN: 0
10. IF YOU CHECKED OFF ANY PROBLEMS, HOW DIFFICULT HAVE THESE PROBLEMS MADE IT FOR YOU TO DO YOUR WORK, TAKE CARE OF THINGS AT HOME, OR GET ALONG WITH OTHER PEOPLE: 0
3. TROUBLE FALLING OR STAYING ASLEEP: 1
8. MOVING OR SPEAKING SO SLOWLY THAT OTHER PEOPLE COULD HAVE NOTICED. OR THE OPPOSITE, BEING SO FIGETY OR RESTLESS THAT YOU HAVE BEEN MOVING AROUND A LOT MORE THAN USUAL: 0
7. TROUBLE CONCENTRATING ON THINGS, SUCH AS READING THE NEWSPAPER OR WATCHING TELEVISION: 0
SUM OF ALL RESPONSES TO PHQ QUESTIONS 1-9: 1
2. FEELING DOWN, DEPRESSED OR HOPELESS: 0
SUM OF ALL RESPONSES TO PHQ QUESTIONS 1-9: 1
SUM OF ALL RESPONSES TO PHQ9 QUESTIONS 1 & 2: 0
4. FEELING TIRED OR HAVING LITTLE ENERGY: 0
SUM OF ALL RESPONSES TO PHQ QUESTIONS 1-9: 1
1. LITTLE INTEREST OR PLEASURE IN DOING THINGS: 0
5. POOR APPETITE OR OVEREATING: 0

## 2023-08-10 NOTE — PLAN OF CARE
[facility] Facility-Based Program  Individualized Cardiac Treatment Plan    Patient Name:  Malou Dubose  :  1950  Age:  68 y.o. MRN:  [id number]  Diagnosis: PCI to LAD  Date of Event: 23   Physician:  Tim Szymanski CNP/Shaneka  Next Office Visit:  10/30/23  Date Entered Program: 23  Risk Stratifications: [] Low [] Intermediate [] High  Allergies: Allergies   Allergen Reactions    Crestor [Rosuvastatin]      Muscle aches    Tape Ranny Hadley Tape] Rash     Blisters and red       Individual Cardiac Treatment Plan -EXERCISE  INITIAL 30 DAY 60 DAY FINAL DAY   EXERCISE  ASSESSMENT/PLAN EXERCISE  REASSESSMENT EXERCISE   REASSESSMENT EXERCISE  DISCHARGE/FOLLOW-UP   Date: 23 Date: 2023 Date: 23 Date:  23   Session #1   Total Session #24   First Exercise Session:23   Total Session #  Total Session # 72  Last Exercise Session:  23   Stages of Change Stages of Change Stages of Change Stages of Change   [] Pre Contemplation  [x] Contemplation  [] Preparation  [] Action  [] Maintenance  [] Relapse [] Pre Contemplation  [x] Contemplation  [] Preparation  [] Action  [] Maintenance  [] Relapse [] Pre Contemplation  [x] Contemplation  [] Preparation  [] Action  [] Maintenance  [] Relapse [] Pre Contemplation  [] Contemplation  [] Preparation  [x] Action  [] Maintenance  [] Relapse   EXERCISE ASSESSMENT EXERCISE ASSESSMENT EXERCISE ASSESSMENT EXERCISE ASSESSMENT   6 Min Walk Test  Distance walked:   0.21 miles  1108 ft.  2.61 METs  Max HR:80 BPM      RPE:  11    THR:  106-139  Rhythm:  NSR   6 Min Walk Test  Distance walked:   0.30 miles  1584 ft  3.30 METs  Max HR: 94 BPM      RPE:  9  %Change ft= 43%    Rhythm:  NSR   DASI: 7.44 METs DASI:  9.89METs DASI:  9.89 METs DASI:  METs   Return to Work  [employer] on returning to work?    [] Yes              [] No   [] Disabled     [x] Retired     Return to work:  retired

## 2023-08-11 ENCOUNTER — HOSPITAL ENCOUNTER (OUTPATIENT)
Dept: CARDIAC REHAB | Age: 73
Setting detail: THERAPIES SERIES
Discharge: HOME | End: 2023-08-11
Payer: MEDICARE

## 2023-08-11 ENCOUNTER — APPOINTMENT (OUTPATIENT)
Dept: CARDIAC REHAB | Age: 73
End: 2023-08-11
Payer: MEDICARE

## 2023-08-11 PROCEDURE — G0422 INTENS CARDIAC REHAB W/EXERC: HCPCS

## 2023-08-11 PROCEDURE — G0423 INTENS CARDIAC REHAB NO EXER: HCPCS

## 2023-08-11 NOTE — PROGRESS NOTES
Hailee BASS.:  1950  Acct Number: [de-identified]  MRN:  [id number]                  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             Date: 2023        Session # ________   Geo Slaterippo class covered:    [address]    [] Adding Flavor - Sodium-Free  [] Fast & Healthy Breakfasts    [] Powerhouse Plant-Based Proteins [] Satisfying Salads and Dressings    [] Simple Sides & Sauces   [] Personalizing Your Plate    [x] Delicious Desserts    [] Savory Soups    [] Efficiency Cooking - Meals in a Snap [] Tasty Appetizers & Snacks     [] Comforting Weekend Breakfasts  [] One-Pot Wonders    [] Fast Evening Meals   [] Easy Entertaining    []  International Cuisine Spotlight on the [place]          Patients were shown how to choose, prep, and cook; substitutions and other options were given. Samples were offered. Recipes were given and questions answered. The patient above was in the [employer] for 50 minutes.       Electronically signed by Terrell Anguiano RD on 2023 at 2:12 PM

## 2023-08-14 ENCOUNTER — APPOINTMENT (OUTPATIENT)
Dept: CARDIAC REHAB | Age: 73
End: 2023-08-14
Payer: MEDICARE

## 2023-09-26 DIAGNOSIS — E03.9 ACQUIRED HYPOTHYROIDISM: ICD-10-CM

## 2023-09-26 DIAGNOSIS — E78.1 HYPERTRIGLYCERIDEMIA: ICD-10-CM

## 2023-09-26 RX ORDER — FENOFIBRATE 160 MG/1
160 TABLET ORAL DAILY
Qty: 90 TABLET | Refills: 3 | Status: SHIPPED | OUTPATIENT
Start: 2023-09-26

## 2023-09-26 RX ORDER — LEVOTHYROXINE SODIUM 100 MCG
TABLET ORAL
Qty: 90 TABLET | Refills: 3 | Status: SHIPPED | OUTPATIENT
Start: 2023-09-26

## 2023-10-03 ENCOUNTER — COMMUNITY OUTREACH (OUTPATIENT)
Dept: FAMILY MEDICINE CLINIC | Age: 73
End: 2023-10-03

## 2023-10-23 ENCOUNTER — TELEPHONE (OUTPATIENT)
Dept: NEUROLOGY | Age: 73
End: 2023-10-23

## 2023-10-23 NOTE — TELEPHONE ENCOUNTER
Wife, on HIPAA, called stating patient had episode on Saturday of confusion and dizziness. Wife feels this episode was stronger than episodes in the past. Wife felt patient was  having some fogginess and was not himself. Patient couldn't get his words to come out the way he wanted them. He lied down for 45 minutes and felt better. He did well yesterday and feels normal today. BP= 166/90, which is a little high for patient per wife. Wife feels patient is having increase in confusion over the past 2-3 months. Wife asked patient if he wanted to watch TV and patient asked where his glasses were. Per wife, patient was already wearing his glasses. Last visit was follow up as needed. No appointment on file. Wife is asking if patient should follow up. Please advise. Thank you.

## 2023-10-23 NOTE — TELEPHONE ENCOUNTER
Wife notified and verbalized understanding. Per wife, BP during spell was around 166/90. Patient scheduled for follow up on 10/30/23.

## 2023-10-23 NOTE — TELEPHONE ENCOUNTER
Did they measure his blood pressure during the spell? Need to obtain blood pressure and pulse during the spell.    Can schedule him follow up, next available  Quinn Drake, CNP

## 2023-10-30 ENCOUNTER — OFFICE VISIT (OUTPATIENT)
Dept: CARDIOLOGY CLINIC | Age: 73
End: 2023-10-30
Payer: MEDICARE

## 2023-10-30 ENCOUNTER — OFFICE VISIT (OUTPATIENT)
Dept: NEUROLOGY | Age: 73
End: 2023-10-30
Payer: MEDICARE

## 2023-10-30 VITALS
WEIGHT: 188 LBS | HEART RATE: 55 BPM | SYSTOLIC BLOOD PRESSURE: 139 MMHG | BODY MASS INDEX: 35.5 KG/M2 | DIASTOLIC BLOOD PRESSURE: 80 MMHG | HEIGHT: 61 IN | OXYGEN SATURATION: 99 %

## 2023-10-30 VITALS
DIASTOLIC BLOOD PRESSURE: 62 MMHG | WEIGHT: 187 LBS | BODY MASS INDEX: 26.83 KG/M2 | HEART RATE: 64 BPM | SYSTOLIC BLOOD PRESSURE: 152 MMHG

## 2023-10-30 DIAGNOSIS — R42 DIZZINESS: Primary | ICD-10-CM

## 2023-10-30 DIAGNOSIS — Z86.73 HISTORY OF STROKE: ICD-10-CM

## 2023-10-30 DIAGNOSIS — R41.0 EPISODE OF CONFUSION: ICD-10-CM

## 2023-10-30 DIAGNOSIS — I10 PRIMARY HYPERTENSION: ICD-10-CM

## 2023-10-30 DIAGNOSIS — I48.0 PAROXYSMAL ATRIAL FIBRILLATION (HCC): Primary | ICD-10-CM

## 2023-10-30 DIAGNOSIS — I25.10 CORONARY ARTERY DISEASE INVOLVING NATIVE CORONARY ARTERY OF NATIVE HEART WITHOUT ANGINA PECTORIS: ICD-10-CM

## 2023-10-30 DIAGNOSIS — E78.01 FAMILIAL HYPERCHOLESTEROLEMIA: ICD-10-CM

## 2023-10-30 DIAGNOSIS — R55 NEAR SYNCOPE: ICD-10-CM

## 2023-10-30 PROCEDURE — 1036F TOBACCO NON-USER: CPT | Performed by: NUCLEAR MEDICINE

## 2023-10-30 PROCEDURE — 3078F DIAST BP <80 MM HG: CPT | Performed by: NUCLEAR MEDICINE

## 2023-10-30 PROCEDURE — 3075F SYST BP GE 130 - 139MM HG: CPT | Performed by: NURSE PRACTITIONER

## 2023-10-30 PROCEDURE — G8427 DOCREV CUR MEDS BY ELIG CLIN: HCPCS | Performed by: NURSE PRACTITIONER

## 2023-10-30 PROCEDURE — G8484 FLU IMMUNIZE NO ADMIN: HCPCS | Performed by: NURSE PRACTITIONER

## 2023-10-30 PROCEDURE — 99214 OFFICE O/P EST MOD 30 MIN: CPT | Performed by: NURSE PRACTITIONER

## 2023-10-30 PROCEDURE — G8417 CALC BMI ABV UP PARAM F/U: HCPCS | Performed by: NURSE PRACTITIONER

## 2023-10-30 PROCEDURE — 1036F TOBACCO NON-USER: CPT | Performed by: NURSE PRACTITIONER

## 2023-10-30 PROCEDURE — 99214 OFFICE O/P EST MOD 30 MIN: CPT | Performed by: NUCLEAR MEDICINE

## 2023-10-30 PROCEDURE — 1123F ACP DISCUSS/DSCN MKR DOCD: CPT | Performed by: NURSE PRACTITIONER

## 2023-10-30 PROCEDURE — 3077F SYST BP >= 140 MM HG: CPT | Performed by: NUCLEAR MEDICINE

## 2023-10-30 PROCEDURE — G8427 DOCREV CUR MEDS BY ELIG CLIN: HCPCS | Performed by: NUCLEAR MEDICINE

## 2023-10-30 PROCEDURE — 1123F ACP DISCUSS/DSCN MKR DOCD: CPT | Performed by: NUCLEAR MEDICINE

## 2023-10-30 PROCEDURE — 3078F DIAST BP <80 MM HG: CPT | Performed by: NURSE PRACTITIONER

## 2023-10-30 PROCEDURE — G8417 CALC BMI ABV UP PARAM F/U: HCPCS | Performed by: NUCLEAR MEDICINE

## 2023-10-30 PROCEDURE — G8484 FLU IMMUNIZE NO ADMIN: HCPCS | Performed by: NUCLEAR MEDICINE

## 2023-10-30 PROCEDURE — 3017F COLORECTAL CA SCREEN DOC REV: CPT | Performed by: NURSE PRACTITIONER

## 2023-10-30 PROCEDURE — 3017F COLORECTAL CA SCREEN DOC REV: CPT | Performed by: NUCLEAR MEDICINE

## 2023-10-30 RX ORDER — CLOPIDOGREL BISULFATE 75 MG/1
75 TABLET ORAL DAILY
Qty: 90 TABLET | Refills: 3 | Status: SHIPPED | OUTPATIENT
Start: 2023-10-30

## 2023-10-30 RX ORDER — VALSARTAN 160 MG/1
160 TABLET ORAL 2 TIMES DAILY
Qty: 180 TABLET | Refills: 3 | Status: SHIPPED | OUTPATIENT
Start: 2023-10-30

## 2023-10-30 RX ORDER — NITROGLYCERIN 0.4 MG/1
TABLET SUBLINGUAL
Qty: 25 TABLET | Refills: 3 | Status: SHIPPED | OUTPATIENT
Start: 2023-10-30

## 2023-10-30 RX ORDER — ATENOLOL 25 MG/1
12.5 TABLET ORAL 2 TIMES DAILY
Qty: 90 TABLET | Refills: 3 | Status: SHIPPED | OUTPATIENT
Start: 2023-10-30

## 2023-10-30 NOTE — PROGRESS NOTES
[address]  [facility]  [address]  [facility] [address]  Dept: [phone]  Dept Fax: [phone]  Loc: [phone]    Visit Date: 10/30/2023    rAianne Talbot is a 68 y.o. male who presents todayfor:  Chief Complaint   Patient presents with    Follow-up     6 month follow up     Coronary Artery Disease    Hypertension    Atrial Fibrillation    Hyperlipidemia   Admitted for [place]  And A fib RVR  Ended up with LAD stent  Some chest pain at times  Not all the time  Some times exertional   Some exertional dyspnea  Did use nitro one time  No dizziness  No syncope  BP seems stable  On statins for hyperlipidemia  A fib seems stable   Losing weight       HPI:  HPI  Past Medical History:   Diagnosis Date    A-fib ([address])     Acquired hypothyroidism 02/21/2019    CAD (coronary artery disease)     Cancer (HCC)     SKIN    DDD (degenerative disc disease), cervical 04/10/2017    GERD (gastroesophageal reflux disease)     Gout     Hyperlipidemia     Hypertension       Past Surgical History:   Procedure Laterality Date    CARDIAC CATHETERIZATION  08/20/2004    EF 60%    CARDIAC CATHETERIZATION  03/01/2003    EF 60%    CARDIOVASCULAR STRESS TEST  02/25/2010    EF 63%    CARDIOVASCULAR STRESS TEST  11/17/2006    EF 68%    CARDIOVASCULAR STRESS TEST  08/18/2005    EF 48%    CARDIOVASCULAR STRESS TEST  07/27/2004    EF 63%    CARDIOVASCULAR STRESS TEST  03/18/2003    EF 43%    CAROTID STENT PLACEMENT      x3    DIAGNOSTIC CARDIAC CATH LAB PROCEDURE  08/18/2000    EF 70%    PTCA  07/19/2006    CYPHER STENT TO MID RCA    TRANSTHORACIC ECHOCARDIOGRAM  02/25/2010    EF 55-65%     Family History   Problem Relation Age of Onset    High Blood Pressure Mother     High Blood Pressure Father     Diabetes Sister     Heart Disease Maternal Grandmother     High Blood Pressure Maternal Grandmother     High Blood Pressure Maternal Grandfather     High Blood Pressure Paternal

## 2023-10-30 NOTE — PROGRESS NOTES
2 weeks ago on a Saturday he had to take a Nitro, Was on vacation going up and down a lot of stairs. Denies any current chest pain, SOB, or swelling in lower extremities. Med list to date and pharmacy verified.

## 2023-10-30 NOTE — PATIENT INSTRUCTIONS
MRI brain WO contrast  EEG   Referral to sleep medicine to screen for underlying sleep disorder  Please measure your blood pressure/pulse during spells. Continue following with cardiology   Follow up in 6 weeks or sooner if needed. Call if any questions or concerns.

## 2023-10-30 NOTE — PROGRESS NOTES
facility use dose modulation, iterative reconstruction, and/or weight-based dosing when appropriate to reduce radiation dose to as low as reasonably achievable. FINDINGS:    There is no hemorrhage. There are no intra-or extra-axial collections. There is no hydrocephalus, midline shift or mass effect. The gray-white matter differentiation is preserved. There is mucosal thickening in the ethmoid air cells, frontal sinus and right maxillary sinus. The paranasal sinuses and mastoid air cells are otherwise clear. There is no suspicious calvarial abnormality. Impression  1. No acute intracranial hemorrhage, mass effect or midline shift. 2. Paranasal sinus disease. **This report has been created using voice recognition software. It may contain minor errors which are inherent in voice recognition technology. **    Final report electronically signed by Dr Michael Moreland on 4/16/2023 3:36 PM         Assessment:     Diagnosis Orders   1. Dizziness  MRI BRAIN WO CONTRAST    EEG      2. Near syncope  MRI BRAIN WO CONTRAST    EEG      3. History of stroke  MRI BRAIN WO CONTRAST    EEG      4. Episode of confusion  MRI BRAIN WO CONTRAST    EEG           Follow up for dizziness, syncope. He denies any more episodes of syncope or dizziness. His wife called in last week,  stating he has had 3 episodes in the past 9 days. He complains of feeling foggy, dizzy during these spells. His face was flushed during each spell, and he did have headache. His wife measured his blood pressure during all three occasions and each time it was noted to be in the 695V systolic. He would rest, and his symptoms would improve as his blood pressure lowered. He is following with cardiology and just saw them today. He recently lost 20 pounds in the past 6 months. No recent changes have been made to his blood pressure medications. Suspect symptoms were related to hypertensive encephalopathy.   We will arrange for him to undergo MRI brain WO

## 2023-11-08 ENCOUNTER — OFFICE VISIT (OUTPATIENT)
Dept: PULMONOLOGY | Age: 73
End: 2023-11-08
Payer: MEDICARE

## 2023-11-08 VITALS
BODY MASS INDEX: 28.14 KG/M2 | DIASTOLIC BLOOD PRESSURE: 64 MMHG | SYSTOLIC BLOOD PRESSURE: 138 MMHG | HEART RATE: 56 BPM | TEMPERATURE: 97.9 F | OXYGEN SATURATION: 98 % | HEIGHT: 69 IN | WEIGHT: 190 LBS

## 2023-11-08 DIAGNOSIS — G47.30 SLEEP APNEA, UNSPECIFIED TYPE: Primary | ICD-10-CM

## 2023-11-08 DIAGNOSIS — I10 ESSENTIAL HYPERTENSION: ICD-10-CM

## 2023-11-08 DIAGNOSIS — G47.10 HYPERSOMNIA: ICD-10-CM

## 2023-11-08 DIAGNOSIS — I48.20 CHRONIC ATRIAL FIBRILLATION (HCC): ICD-10-CM

## 2023-11-08 PROCEDURE — 3075F SYST BP GE 130 - 139MM HG: CPT | Performed by: INTERNAL MEDICINE

## 2023-11-08 PROCEDURE — 99204 OFFICE O/P NEW MOD 45 MIN: CPT | Performed by: INTERNAL MEDICINE

## 2023-11-08 PROCEDURE — G8427 DOCREV CUR MEDS BY ELIG CLIN: HCPCS | Performed by: INTERNAL MEDICINE

## 2023-11-08 PROCEDURE — 3017F COLORECTAL CA SCREEN DOC REV: CPT | Performed by: INTERNAL MEDICINE

## 2023-11-08 PROCEDURE — G8484 FLU IMMUNIZE NO ADMIN: HCPCS | Performed by: INTERNAL MEDICINE

## 2023-11-08 PROCEDURE — 3078F DIAST BP <80 MM HG: CPT | Performed by: INTERNAL MEDICINE

## 2023-11-08 PROCEDURE — 1123F ACP DISCUSS/DSCN MKR DOCD: CPT | Performed by: INTERNAL MEDICINE

## 2023-11-08 PROCEDURE — 1036F TOBACCO NON-USER: CPT | Performed by: INTERNAL MEDICINE

## 2023-11-08 PROCEDURE — G8417 CALC BMI ABV UP PARAM F/U: HCPCS | Performed by: INTERNAL MEDICINE

## 2023-11-08 NOTE — PROGRESS NOTES
Chief Complaint: New sleep consult no prior studies    Mallampati airway Class:4  Neck Circumference:. 16.75 Inches    [place] sleepiness score 11/8/23: 5  Sleep apnea quality of life questionnaire:80
equipment until his sleep symptoms are under good control. Feltcher Waters verbalizes understanding.  -He was advised to loose weight by controlling diet and doing exercise once cleared by his family physician. - Radha Mckay was educated about my impression and plan. He verbalizes understanding.      -I personally reviewed updated the Past medical hx, Past surgical hx,Social hx, Family hx, Medications, Allergies in the discrete data section of the patient chart along with labs, Pulmonary medicine,Sleep medicine related, Pathological, Microbiological and Radiological investigations.

## 2023-11-08 NOTE — PATIENT INSTRUCTIONS
Recommendations/Plan:  -Will schedule patient for polysomnogram in the sleep lab. -I had a detailed discussion with the patient regarding current indications and limitations along with Pros and Cons for in lab polysomnogram Vs Portable sleep test at home. At the end of the discussion he choose to go for the in lab sleep test.  -I had a discussion with patient regarding avialable treatment options for his sleep disorder breathing including but not limited to CPAP titration in the sleep lab Vs.Dental appliance placement with referral to a local dentist Vs other available surgical options including Uvulopalatopharyngoplasty, maxillomandibular ostomy,Inspire device placement and tracheostomy as last option. At the end of discussion, he is not decided on his treatment if he found to have obstructive sleep apnea at this time.  -We will see Raza Kathryn back in 1week after the sleep study to go over the sleep study results and further management options.  -He was educated to practice good sleep hygiene practices. He was provided with a good sleep hygiene hand out.  -Aaron Willis was advised to make earlier appointment with my clinic if he develops any worsening of sleep symptoms. He verbalizes understanding.  -Aaron Willis was advised to not to drive any motor vehicles or operate heavy equipment until his sleep symptoms are under good control. Newton Waters verbalizes understanding.  -He was advised to loose weight by controlling diet and doing exercise once cleared by his family physician. - Raza Peralta was educated about my impression and plan. He verbalizes understanding.

## 2023-11-20 ENCOUNTER — HOSPITAL ENCOUNTER (OUTPATIENT)
Dept: MRI IMAGING | Age: 73
Discharge: HOME | End: 2023-11-20
Payer: MEDICARE

## 2023-11-20 ENCOUNTER — HOSPITAL ENCOUNTER (OUTPATIENT)
Dept: NEUROLOGY | Age: 73
Discharge: HOME | End: 2023-11-20
Payer: MEDICARE

## 2023-11-20 DIAGNOSIS — R42 DIZZINESS: ICD-10-CM

## 2023-11-20 DIAGNOSIS — R41.0 EPISODE OF CONFUSION: ICD-10-CM

## 2023-11-20 DIAGNOSIS — Z86.73 HISTORY OF STROKE: ICD-10-CM

## 2023-11-20 DIAGNOSIS — R55 NEAR SYNCOPE: ICD-10-CM

## 2023-11-20 PROCEDURE — 95819 EEG AWAKE AND ASLEEP: CPT

## 2023-11-20 PROCEDURE — 95819 EEG AWAKE AND ASLEEP: CPT | Performed by: PSYCHIATRY & NEUROLOGY

## 2023-11-20 PROCEDURE — 70551 MRI BRAIN STEM W/O DYE: CPT

## 2023-11-20 NOTE — PROGRESS NOTES
Diagnosis Date    A-fib [facility])     Acquired hypothyroidism 02/21/2019    CAD (coronary artery disease)     Cancer (HCC)     SKIN    DDD (degenerative disc disease), cervical 04/10/2017    GERD (gastroesophageal reflux disease)     Gout     Hyperlipidemia     Hypertension          Prior to Admission medications    Medication Sig Start Date End Date Taking? Authorizing Provider   atenolol (TENORMIN) 25 MG tablet Take 0.5 tablets by mouth 2 times daily 10/30/23   Chanel Hart MD   clopidogrel (PLAVIX) 75 MG tablet Take 1 tablet by mouth daily 10/30/23   Chanel Hart MD   valsartan (DIOVAN) 160 MG tablet Take 1 tablet by mouth 2 times daily 10/30/23   Chanel Hart MD   nitroGLYCERIN (NITROSTAT) 0.4 MG SL tablet up to max of 3 total doses.  If no relief after 1 dose, call 911. 10/30/23   Ugo Munroe MD   SYNTHROID 100 MCG tablet TAKE 1 TABLET BY MOUTH ONCE DAILY ON AN EMPTY STOMACH WITH WATER, WAIT 30 MINUTES BEFORE EATING OR TAKING OTHER MEDS 9/26/23   BASHIR Liang CNP   fenofibrate (TRIGLIDE) 160 MG tablet Take 1 tablet by mouth once daily 9/26/23   BASHIR Liang CNP   amLODIPine (NORVASC) 5 MG tablet Take 1 tablet by mouth in the morning and at bedtime 7/10/23   Chanel Hart MD   folic acid (FOLVITE) 1 MG tablet Take 1 tablet by mouth daily 7/3/23   BASHIR Barnes CNP   naproxen (NAPROSYN) 500 MG tablet Take 1 tablet by mouth 2 times daily (with meals)    Miryam Rincon MD   Alirocumab (PRALUENT) 150 MG/ML SOAJ Inject 1 mL into the skin every 30 days 2/17/23   BASHIR Liang CNP   apixaban (ELIQUIS) 5 MG TABS tablet TAKE 1 TABLET BY MOUTH EVERY 12 HOURS 11/16/22   Xochitl Fernandez APRN - CNP   Multiple Vitamins-Minerals (THERAPEUTIC MULTIVITAMIN-MINERALS) tablet Take 1 tablet by mouth daily    Miryam Rincon MD   Coenzyme Q10 100 MG CAPS Take 1 capsule by mouth daily    Miryam Rincon MD       Technician: Nicole Lozano

## 2023-11-21 ENCOUNTER — TELEPHONE (OUTPATIENT)
Dept: NEUROLOGY | Age: 73
End: 2023-11-21

## 2023-11-21 NOTE — TELEPHONE ENCOUNTER
Advised the patient and wife- Annel Deloris regarding results. Verbalized understanding with no questions at this time.

## 2023-11-21 NOTE — RESULT ENCOUNTER NOTE
Please let patient know MRI Brain  is stable compared to prior study 5/2023.   Malika Chanel MD MD 5:41 AM

## 2023-11-21 NOTE — TELEPHONE ENCOUNTER
----- Message from Alexa Farmer MD sent at 11/21/2023  5:42 AM EST -----  Please let patient know MRI Brain  is stable compared to prior study 5/2023.   Alexa Farmer MD MD 5:41 AM

## 2023-11-21 NOTE — PROCEDURES
[place] [address]                          ELECTROENCEPHALOGRAM REPORT    PATIENT NAME: Joann Muñoz                  :        1950  MED REC NO:   [id number]                           ROOM:  ACCOUNT NO:   [de-identified]                           ADMIT DATE: 2023  PROVIDER:     Milagros Cheema. Jacquelyn Gore MD    DATE OF EE2023    REFERRING PROVIDER:  Dashawn Carpio CNP    CLINICAL HISTORY:  A 21-year-old female presenting with episodes of  dizziness with syncope, feeling foggy and dizzy, flushed with headache. Medications listed are Tenormin, Plavix, Diovan, Nitrostat, Synthroid,  Triglide, Norvasc, Folvite, Naprosyn, Eliquis, Coenzyme Q10,  multivitamins. CLINICAL INTERPRETATION:  This is a routine 20-minute EEG recording  using the international 10/20 system on [employer] workstation. Automated  spike and seizure detection algorithms were applied. The patient is  described as alert. The background rhythm activity is noted to be 9 Hz in the posterior  parietal area symmetric, attenuates with eye opening. The patient is  noted to be drowsy during parts of recording. Light stages of sleep are  seen during the recording. Photic stimulation was performed without  abnormality. There was no evidence of epileptiform appreciated. IMPRESSION:  This is a normal awake and asleep EEG. There was no  evidence of epileptiform activity appreciated.         Lamont Andrews MD    D: 2023 10:56:39       T: 2023 11:33:01     YOU/JOSE [email or web address]  Job#: [id number]     Doc#: [id number]    CC:

## 2023-11-24 DIAGNOSIS — I73.9 PAD (PERIPHERAL ARTERY DISEASE) (HCC): ICD-10-CM

## 2023-11-24 DIAGNOSIS — I48.0 PAF (PAROXYSMAL ATRIAL FIBRILLATION) (HCC): ICD-10-CM

## 2023-12-13 ENCOUNTER — OFFICE VISIT (OUTPATIENT)
Dept: FAMILY MEDICINE CLINIC | Age: 73
End: 2023-12-13
Payer: MEDICARE

## 2023-12-13 VITALS
WEIGHT: 188.1 LBS | HEART RATE: 72 BPM | RESPIRATION RATE: 16 BRPM | DIASTOLIC BLOOD PRESSURE: 58 MMHG | SYSTOLIC BLOOD PRESSURE: 138 MMHG | BODY MASS INDEX: 27.78 KG/M2

## 2023-12-13 DIAGNOSIS — I25.110 CORONARY ARTERY DISEASE INVOLVING NATIVE CORONARY ARTERY OF NATIVE HEART WITH UNSTABLE ANGINA PECTORIS (HCC): ICD-10-CM

## 2023-12-13 DIAGNOSIS — D68.69 SECONDARY HYPERCOAGULABLE STATE (HCC): ICD-10-CM

## 2023-12-13 DIAGNOSIS — I73.9 PAD (PERIPHERAL ARTERY DISEASE) (HCC): ICD-10-CM

## 2023-12-13 DIAGNOSIS — R04.0 EPISTAXIS: ICD-10-CM

## 2023-12-13 DIAGNOSIS — E11.9 TYPE 2 DIABETES MELLITUS WITHOUT COMPLICATION, WITHOUT LONG-TERM CURRENT USE OF INSULIN (HCC): ICD-10-CM

## 2023-12-13 DIAGNOSIS — R63.4 UNEXPLAINED WEIGHT LOSS: Primary | ICD-10-CM

## 2023-12-13 DIAGNOSIS — E03.9 ACQUIRED HYPOTHYROIDISM: ICD-10-CM

## 2023-12-13 DIAGNOSIS — I48.0 PAF (PAROXYSMAL ATRIAL FIBRILLATION) (HCC): ICD-10-CM

## 2023-12-13 DIAGNOSIS — R53.83 OTHER FATIGUE: ICD-10-CM

## 2023-12-13 DIAGNOSIS — I10 ESSENTIAL HYPERTENSION: ICD-10-CM

## 2023-12-13 PROCEDURE — 3078F DIAST BP <80 MM HG: CPT | Performed by: NURSE PRACTITIONER

## 2023-12-13 PROCEDURE — G8427 DOCREV CUR MEDS BY ELIG CLIN: HCPCS | Performed by: NURSE PRACTITIONER

## 2023-12-13 PROCEDURE — 3075F SYST BP GE 130 - 139MM HG: CPT | Performed by: NURSE PRACTITIONER

## 2023-12-13 PROCEDURE — 1123F ACP DISCUSS/DSCN MKR DOCD: CPT | Performed by: NURSE PRACTITIONER

## 2023-12-13 PROCEDURE — G8484 FLU IMMUNIZE NO ADMIN: HCPCS | Performed by: NURSE PRACTITIONER

## 2023-12-13 PROCEDURE — 99214 OFFICE O/P EST MOD 30 MIN: CPT | Performed by: NURSE PRACTITIONER

## 2023-12-13 PROCEDURE — 3044F HG A1C LEVEL LT 7.0%: CPT | Performed by: NURSE PRACTITIONER

## 2023-12-13 PROCEDURE — 2022F DILAT RTA XM EVC RTNOPTHY: CPT | Performed by: NURSE PRACTITIONER

## 2023-12-13 PROCEDURE — 3017F COLORECTAL CA SCREEN DOC REV: CPT | Performed by: NURSE PRACTITIONER

## 2023-12-13 PROCEDURE — G8417 CALC BMI ABV UP PARAM F/U: HCPCS | Performed by: NURSE PRACTITIONER

## 2023-12-13 PROCEDURE — 1036F TOBACCO NON-USER: CPT | Performed by: NURSE PRACTITIONER

## 2023-12-13 ASSESSMENT — ENCOUNTER SYMPTOMS
SHORTNESS OF BREATH: 0
COUGH: 0
ABDOMINAL PAIN: 0
NAUSEA: 0

## 2023-12-13 NOTE — PROGRESS NOTES
CT abd pelvis and chest scheduled for this Saturday 12/16/23 at 8:40am. Pt is to report to [address] [place] [address] by 8:10am. NPO 4hrs prior.

## 2023-12-13 NOTE — PROGRESS NOTES
[facility] (1950) 68 y.o. male here for evaluation of the following chief complaint(s):      HPI:  Chief Complaint   Patient presents with    Nasal Congestion     The past 4days    Epistaxis    Weight Loss     25 lb weight unintended weight loss the past 4-6wks    Fatigue       Ongoing for 4-6 weeks of unintended weight loss. Appetite good. Complaints of fatigue for 2-3 months. Onset of 4 days with nasal congestion. Nose bleeds x 3. On Eliquis and Plavix    Following with SLEEP and NEURO. Recent MRI Brain was NEG for acute process. Wt Readings from Last 3 Encounters:   12/13/23 85.3 kg (188 lb 1.6 oz)   11/08/23 86.2 kg (190 lb)   11/20/23 83.9 kg (185 lb)       On Synthroid 100 mcg     Lab Results   Component Value Date    TSH 1.930 04/16/2023       Vitals:    12/13/23 1309   BP: (!) 138/58   Pulse: 72   Resp: 16         Patient Active Problem List   Diagnosis    Coronary artery disease involving native coronary artery of native heart with unstable angina pectoris (HCC)    Hypertension    Hyperlipidemia    Sleep apnea    Hemorrhoids    GERD (gastroesophageal reflux disease)    Pharyngitis, acute    Back pain    Urinary frequency    Hematuria, undiagnosed cause    OAB (overactive bladder)    BPH associated with nocturia    Tracheobronchitis    Cervical muscle pain    Acute cervical myofascial strain, secondary to fall    DDD (degenerative disc disease), cervical    Skin tag, buttocks. Chest pain    Angina effort    Dyslipidemia    S/P angioplasty with stent    Erectile dysfunction due to arterial insufficiency    Primary insomnia    Medication monitoring encounter    Bilateral tinnitus    Acquired hypothyroidism    Atrial fibrillation with RVR (HCC)    Atrial flutter (HCC)    PAD (peripheral artery disease) (HCC)    Unstable angina (HCC)    Obesity (BMI 30.0-34. 9)    Stented coronary artery    Premature atrial complexes    Type 2 diabetes mellitus    Syncope and collapse    Secondary

## 2023-12-15 RX ORDER — VALSARTAN AND HYDROCHLOROTHIAZIDE 160; 12.5 MG/1; MG/1
1 TABLET, FILM COATED ORAL 2 TIMES DAILY
Qty: 180 TABLET | Refills: 1 | Status: SHIPPED | OUTPATIENT
Start: 2023-12-15

## 2023-12-15 RX ORDER — VALSARTAN AND HYDROCHLOROTHIAZIDE 160; 12.5 MG/1; MG/1
1 TABLET, FILM COATED ORAL 2 TIMES DAILY
COMMUNITY
End: 2023-12-15 | Stop reason: SDUPTHER

## 2023-12-15 NOTE — TELEPHONE ENCOUNTER
Called and spoke to Annie Steiner, patients wife. Medication changed were discussed. New script for Diovan/HCTZ ready to send.

## 2023-12-15 NOTE — TELEPHONE ENCOUNTER
Pt's wife called stating the patient's BP has been creeping up. 184/94 174/83 146/73. She increased the Atenolol to a full tablet 25 mg BID from 1/2 tablet BID. HR in the 50's  She is asking if you want to keep him at 25 mg BID or change something else? She will need a new script for Atenolol if it's changed. Please advise.

## 2023-12-16 ENCOUNTER — HOSPITAL ENCOUNTER (OUTPATIENT)
Dept: CT IMAGING | Age: 73
Discharge: HOME | End: 2023-12-16
Payer: MEDICARE

## 2023-12-16 ENCOUNTER — HOSPITAL ENCOUNTER (OUTPATIENT)
Age: 73
Discharge: HOME | End: 2023-12-16
Payer: MEDICARE

## 2023-12-16 DIAGNOSIS — R53.83 OTHER FATIGUE: ICD-10-CM

## 2023-12-16 DIAGNOSIS — R04.0 EPISTAXIS: ICD-10-CM

## 2023-12-16 DIAGNOSIS — R63.4 UNEXPLAINED WEIGHT LOSS: ICD-10-CM

## 2023-12-16 LAB
ALBUMIN SERPL BCG-MCNC: 4.2 G/DL (ref 3.5–5.1)
ALP SERPL-CCNC: 43 U/L (ref 38–126)
ALT SERPL W/O P-5'-P-CCNC: 11 U/L (ref 11–66)
ANION GAP SERPL CALC-SCNC: 8 MEQ/L (ref 8–16)
AST SERPL-CCNC: 17 U/L (ref 5–40)
BASOPHILS ABSOLUTE: 0.1 THOU/MM3 (ref 0–0.1)
BASOPHILS NFR BLD AUTO: 1.6 %
BILIRUB SERPL-MCNC: 0.4 MG/DL (ref 0.3–1.2)
BUN SERPL-MCNC: 18 MG/DL (ref 7–22)
CALCIUM SERPL-MCNC: 9.5 MG/DL (ref 8.5–10.5)
CHLORIDE SERPL-SCNC: 104 MEQ/L (ref 98–111)
CO2 SERPL-SCNC: 30 MEQ/L (ref 23–33)
CREAT SERPL-MCNC: 1.4 MG/DL (ref 0.4–1.2)
DEPRECATED MEAN GLUCOSE BLD GHB EST-ACNC: 114 MG/DL (ref 70–126)
DEPRECATED RDW RBC AUTO: 42.5 FL (ref 35–45)
EOSINOPHIL NFR BLD AUTO: 4.2 %
EOSINOPHILS ABSOLUTE: 0.3 THOU/MM3 (ref 0–0.4)
ERYTHROCYTE [DISTWIDTH] IN BLOOD BY AUTOMATED COUNT: 12.8 % (ref 11.5–14.5)
GFR SERPL CREATININE-BSD FRML MDRD: 53 ML/MIN/1.73M2
GLUCOSE SERPL-MCNC: 109 MG/DL (ref 70–108)
HBA1C MFR BLD HPLC: 5.8 % (ref 4.4–6.4)
HCT VFR BLD AUTO: 44.1 % (ref 42–52)
HGB BLD-MCNC: 14.6 GM/DL (ref 14–18)
IMM GRANULOCYTES # BLD AUTO: 0.03 THOU/MM3 (ref 0–0.07)
IMM GRANULOCYTES NFR BLD AUTO: 0.4 %
LYMPHOCYTES ABSOLUTE: 2.2 THOU/MM3 (ref 1–4.8)
LYMPHOCYTES NFR BLD AUTO: 30.8 %
MCH RBC QN AUTO: 29.9 PG (ref 26–33)
MCHC RBC AUTO-ENTMCNC: 33.1 GM/DL (ref 32.2–35.5)
MCV RBC AUTO: 90.2 FL (ref 80–94)
MONOCYTES ABSOLUTE: 0.5 THOU/MM3 (ref 0.4–1.3)
MONOCYTES NFR BLD AUTO: 7.1 %
NEUTROPHILS NFR BLD AUTO: 55.9 %
NRBC BLD AUTO-RTO: 0 /100 WBC
PLATELET # BLD AUTO: 236 THOU/MM3 (ref 130–400)
PMV BLD AUTO: 10.3 FL (ref 9.4–12.4)
POC CREATININE WHOLE BLOOD: 1.4 MG/DL (ref 0.5–1.2)
POTASSIUM SERPL-SCNC: 4.3 MEQ/L (ref 3.5–5.2)
PROT SERPL-MCNC: 7.2 G/DL (ref 6.1–8)
RBC # BLD AUTO: 4.89 MILL/MM3 (ref 4.7–6.1)
SEGMENTED NEUTROPHILS ABSOLUTE COUNT: 3.9 THOU/MM3 (ref 1.8–7.7)
SODIUM SERPL-SCNC: 142 MEQ/L (ref 135–145)
TSH SERPL DL<=0.005 MIU/L-ACNC: 2.55 UIU/ML (ref 0.4–4.2)
WBC # BLD AUTO: 7 THOU/MM3 (ref 4.8–10.8)

## 2023-12-16 PROCEDURE — 84443 ASSAY THYROID STIM HORMONE: CPT

## 2023-12-16 PROCEDURE — 82565 ASSAY OF CREATININE: CPT

## 2023-12-16 PROCEDURE — 6360000004 HC RX CONTRAST MEDICATION: Performed by: NURSE PRACTITIONER

## 2023-12-16 PROCEDURE — 80053 COMPREHEN METABOLIC PANEL: CPT

## 2023-12-16 PROCEDURE — 74177 CT ABD & PELVIS W/CONTRAST: CPT

## 2023-12-16 PROCEDURE — 85025 COMPLETE CBC W/AUTO DIFF WBC: CPT

## 2023-12-16 PROCEDURE — 83036 HEMOGLOBIN GLYCOSYLATED A1C: CPT

## 2023-12-16 PROCEDURE — 36415 COLL VENOUS BLD VENIPUNCTURE: CPT

## 2023-12-16 PROCEDURE — 71260 CT THORAX DX C+: CPT

## 2023-12-16 RX ADMIN — IOPAMIDOL 85 ML: 755 INJECTION, SOLUTION INTRAVENOUS at 08:37

## 2024-01-03 ENCOUNTER — OFFICE VISIT (OUTPATIENT)
Dept: UROLOGY | Age: 74
End: 2024-01-03
Payer: MEDICARE

## 2024-01-03 VITALS — HEIGHT: 69 IN | WEIGHT: 188 LBS | BODY MASS INDEX: 27.85 KG/M2 | RESPIRATION RATE: 16 BRPM

## 2024-01-03 DIAGNOSIS — N40.1 BENIGN PROSTATIC HYPERPLASIA WITH WEAK URINARY STREAM: ICD-10-CM

## 2024-01-03 DIAGNOSIS — N41.1 CHRONIC PROSTATITIS: Primary | ICD-10-CM

## 2024-01-03 DIAGNOSIS — N32.89 BLADDER WALL THICKENING: ICD-10-CM

## 2024-01-03 DIAGNOSIS — R39.12 BENIGN PROSTATIC HYPERPLASIA WITH WEAK URINARY STREAM: ICD-10-CM

## 2024-01-03 PROCEDURE — 1123F ACP DISCUSS/DSCN MKR DOCD: CPT | Performed by: UROLOGY

## 2024-01-03 PROCEDURE — 1036F TOBACCO NON-USER: CPT | Performed by: UROLOGY

## 2024-01-03 PROCEDURE — G8417 CALC BMI ABV UP PARAM F/U: HCPCS | Performed by: UROLOGY

## 2024-01-03 PROCEDURE — G8427 DOCREV CUR MEDS BY ELIG CLIN: HCPCS | Performed by: UROLOGY

## 2024-01-03 PROCEDURE — G8484 FLU IMMUNIZE NO ADMIN: HCPCS | Performed by: UROLOGY

## 2024-01-03 PROCEDURE — 3017F COLORECTAL CA SCREEN DOC REV: CPT | Performed by: UROLOGY

## 2024-01-03 PROCEDURE — 99204 OFFICE O/P NEW MOD 45 MIN: CPT | Performed by: UROLOGY

## 2024-01-03 RX ORDER — DOXYCYCLINE HYCLATE 100 MG
100 TABLET ORAL 2 TIMES DAILY
Qty: 60 TABLET | Refills: 0 | Status: SHIPPED | OUTPATIENT
Start: 2024-01-03 | End: 2024-02-02

## 2024-01-03 NOTE — PROGRESS NOTES
Dr. Quinton Santacruz MD  [facility]  Urology Clinic  New Patient Visit      Patient:  James Waters  YOB: 1950  Date: 1/3/2024    HISTORY OF PRESENT ILLNESS:   The patient is a 73 y.o. male who presents today for evaluation of the following problem(s): bladder wall thickening and BPH with prostate volume per CT in 2023 of around 65g. Feeling of prostate irritation.     Overall the problem(s) : are worsening.  Associated Symptoms: No dysuria, gross hematuria.  Pain Severity: 0/10    Summary of old records:   None    Imaging/Labs reviewed during today's visit:  I have independently reviewed and verified the following films during today's visit.  CT scan reviewed. PSA 0.6 in 2019    Last several PSA's:  Lab Results   Component Value Date    PSA 0.62 06/19/2019       Last total testosterone:  No results found for: \"TESTOSTERONE\"    Urinalysis today:  No results found for this visit on 01/03/24.      Last BUN and creatinine:  Lab Results   Component Value Date    BUN 18 12/16/2023     Lab Results   Component Value Date    CREATININE 1.4 (H) 12/16/2023       Imaging Reviewed during this Office Visit:   (results were independently reviewed by physician and radiology report verified)    PAST MEDICAL, FAMILY AND SOCIAL HISTORY:  Past Medical History:   Diagnosis Date    A-fib ([facility])     Acquired hypothyroidism 02/21/2019    Atrial fibrillation with RVR ([facility]) 10/09/2019    CAD (coronary artery disease)     Cancer ([facility])     SKIN    DDD (degenerative disc disease), cervical 04/10/2017    GERD (gastroesophageal reflux disease)     Gout     Hyperlipidemia     Hypertension     Obesity (BMI 30.0-34.9) 02/14/2019    PAD (peripheral artery disease) ([facility]) 12/19/2019     Past Surgical History:   Procedure Laterality Date    CARDIAC CATHETERIZATION  08/20/2004    EF 60%    CARDIAC CATHETERIZATION  03/01/2003    EF 60%    CARDIOVASCULAR STRESS TEST  02/25/2010    EF 63%    CARDIOVASCULAR STRESS TEST  11/17/2006    EF

## 2024-01-23 DIAGNOSIS — I25.110 CORONARY ARTERY DISEASE INVOLVING NATIVE CORONARY ARTERY OF NATIVE HEART WITH UNSTABLE ANGINA PECTORIS (HCC): ICD-10-CM

## 2024-01-23 DIAGNOSIS — Z78.9 STATIN INTOLERANCE: ICD-10-CM

## 2024-01-23 DIAGNOSIS — E78.2 MIXED HYPERLIPIDEMIA: ICD-10-CM

## 2024-01-23 RX ORDER — ALIROCUMAB 150 MG/ML
INJECTION, SOLUTION SUBCUTANEOUS
Qty: 1 ML | Refills: 11 | Status: SHIPPED | OUTPATIENT
Start: 2024-01-23 | End: 2024-01-24 | Stop reason: SDUPTHER

## 2024-01-23 NOTE — TELEPHONE ENCOUNTER
[facility]   150 mg/ML and they stated that they are not contracted with patient insurance any longer and that patient will need to find the speciality pharmacy that is.     Called patient and informed. He verbalized understanding. He will talk with his wife and give us a call.

## 2024-01-24 RX ORDER — ALIROCUMAB 150 MG/ML
INJECTION, SOLUTION SUBCUTANEOUS
Qty: 2 ML | Refills: 5 | Status: SHIPPED | OUTPATIENT
Start: 2024-01-24

## 2024-01-24 NOTE — TELEPHONE ENCOUNTER
Patient wife called and stated that patient had a change in his insurance however the patient was approved for a sherri to help with the cost of the medication.     She call the insurance and they stated that patient will still get the sherri for the cost of the medication but the prescription will need to go to [facility] on [address].     Please send prescription for patient praluent 150 mg/ML to [facility] on [address]. She stated that patient normally gets a 60 day- 2 month supply at a time.

## 2024-01-26 ENCOUNTER — TELEPHONE (OUTPATIENT)
Dept: UROLOGY | Age: 74
End: 2024-01-26

## 2024-01-26 DIAGNOSIS — R30.0 DYSURIA: Primary | ICD-10-CM

## 2024-01-26 LAB
APPEARANCE: CLEAR
BACTERIA: ABNORMAL PER HPF
BILIRUBIN: NEGATIVE
COLOR: YELLOW
CRYSTALS: PRESENT
EPITHELIAL CELLS: ABNORMAL PER HPF (ref 0–5)
GLUCOSE BLD-MCNC: NEGATIVE MG/DL
HYALINE CASTS: ABNORMAL
KETONES, URINE: NEGATIVE
LEUKOCYTE ESTERASE, URINE: NEGATIVE
NITRITE, URINE: NEGATIVE
OCCULT BLOOD,URINE: NEGATIVE
PH: 6 (ref 5–9)
PROTEIN, URINE: NEGATIVE
RBC: ABNORMAL PER HPF (ref 0–5)
SP GRAVITY MISCELLANEOUS: 1.02 (ref 1–1.03)
UROBILINOGEN, URINE: NORMAL
WBC: ABNORMAL PER HPF (ref 0–5)

## 2024-01-26 NOTE — TELEPHONE ENCOUNTER
Patient feels like he has an UTI. He was started on doxycycline on 01/03/2024. He c/o burning and the stream starts and stops. A little more urgency and frequency.    Orders faxed to [facility] to check a urine.    Advised to continue the doxycycline and to push fluids.

## 2024-01-26 NOTE — TELEPHONE ENCOUNTER
I have personally verified, reviewed, and approved these actions.    The patient should go to the ED should they develop fever, chills, nausea, vomiting, chest pain, SOB, calf pain, feelings of incomplete emptying, or should they otherwise feel they need evaluated

## 2024-01-29 LAB — URINE CULTURE, ROUTINE: NORMAL

## 2024-01-30 ENCOUNTER — TELEPHONE (OUTPATIENT)
Dept: UROLOGY | Age: 74
End: 2024-01-30

## 2024-02-21 ENCOUNTER — OFFICE VISIT (OUTPATIENT)
Dept: UROLOGY | Age: 74
End: 2024-02-21

## 2024-02-21 VITALS — HEIGHT: 69 IN | BODY MASS INDEX: 27.85 KG/M2 | WEIGHT: 188 LBS | RESPIRATION RATE: 16 BRPM

## 2024-02-21 DIAGNOSIS — N40.1 BENIGN PROSTATIC HYPERPLASIA WITH WEAK URINARY STREAM: ICD-10-CM

## 2024-02-21 DIAGNOSIS — N41.1 CHRONIC PROSTATITIS: Primary | ICD-10-CM

## 2024-02-21 DIAGNOSIS — R39.12 BENIGN PROSTATIC HYPERPLASIA WITH WEAK URINARY STREAM: ICD-10-CM

## 2024-02-21 RX ORDER — TAMSULOSIN HYDROCHLORIDE 0.4 MG/1
0.4 CAPSULE ORAL DAILY
Qty: 90 CAPSULE | Refills: 3 | Status: SHIPPED | OUTPATIENT
Start: 2024-02-21

## 2024-02-21 NOTE — PROGRESS NOTES
Dr. Quinton Santacruz MD  [facility]  Urology Clinic  New Patient Visit      Patient:  James Waters  YOB: 1950  Date: 2/21/2024    HISTORY OF PRESENT ILLNESS:   The patient is a 73 y.o. male who presents today for evaluation of the following problem(s): bladder wall thickening and BPH with prostate volume per CT in 2023 of around 65g. Feeling of prostate irritation. We gave him Doxy 100mg for 30 days and this helped 100%.     Overall the problem(s) : are better.  Associated Symptoms: No dysuria, gross hematuria.  Pain Severity: 0/10    Summary of old records:   None    Imaging/Labs reviewed during today's visit:  I have independently reviewed and verified the following films during today's visit.  CT scan reviewed. PSA 0.6 in 2019    Last several PSA's:  Lab Results   Component Value Date    PSA 0.62 06/19/2019       Last total testosterone:  No results found for: \"TESTOSTERONE\"    Urinalysis today:  No results found for this visit on 02/21/24.      Last BUN and creatinine:  Lab Results   Component Value Date    BUN 18 12/16/2023     Lab Results   Component Value Date    CREATININE 1.4 (H) 12/16/2023       Imaging Reviewed during this Office Visit:   (results were independently reviewed by physician and radiology report verified)    PAST MEDICAL, FAMILY AND SOCIAL HISTORY:  Past Medical History:   Diagnosis Date    A-fib ([facility])     Acquired hypothyroidism 02/21/2019    Atrial fibrillation with RVR ([facility]) 10/09/2019    CAD (coronary artery disease)     Cancer ([facility])     SKIN    DDD (degenerative disc disease), cervical 04/10/2017    GERD (gastroesophageal reflux disease)     Gout     Hyperlipidemia     Hypertension     Obesity (BMI 30.0-34.9) 02/14/2019    PAD (peripheral artery disease) ([facility]) 12/19/2019     Past Surgical History:   Procedure Laterality Date    CARDIAC CATHETERIZATION  08/20/2004    EF 60%    CARDIAC CATHETERIZATION  03/01/2003    EF 60%    CARDIOVASCULAR STRESS TEST  02/25/2010

## 2024-03-27 ENCOUNTER — TELEPHONE (OUTPATIENT)
Dept: FAMILY MEDICINE CLINIC | Age: 74
End: 2024-03-27

## 2024-03-27 NOTE — TELEPHONE ENCOUNTER
Fax received from [employer] regarding Praluent not on formulary and requiring formulary exception.      PA request for Praluent 150mg/ml #1ml for 30 days submitted online at [email or web address] and pending review.

## 2024-03-27 NOTE — TELEPHONE ENCOUNTER
Fax received from [employer] regarding additional information.  Form filled out and faxed

## 2024-03-29 NOTE — TELEPHONE ENCOUNTER
Denial received on 3/27/24, same day it was submitted for PA and same day additional questions received by fax were filled out and faxed back.  Appeal submitted online with questions answered in media attached.

## 2024-04-02 NOTE — TELEPHONE ENCOUNTER
Spoke with Dangelo at [employer] appeals department ph# [phone] regarding status of appeal of N/A.  He shows approval for medication from 1/1/24 through 12/31/24.  Approval# [id number]

## 2024-04-16 RX ORDER — VALSARTAN AND HYDROCHLOROTHIAZIDE 320; 25 MG/1; MG/1
1 TABLET, FILM COATED ORAL DAILY
Qty: 90 TABLET | Refills: 3 | Status: SHIPPED | OUTPATIENT
Start: 2024-04-16

## 2024-04-30 ENCOUNTER — OFFICE VISIT (OUTPATIENT)
Dept: CARDIOLOGY CLINIC | Age: 74
End: 2024-04-30
Payer: MEDICARE

## 2024-04-30 VITALS
BODY MASS INDEX: 28.85 KG/M2 | HEIGHT: 69 IN | WEIGHT: 194.8 LBS | DIASTOLIC BLOOD PRESSURE: 60 MMHG | SYSTOLIC BLOOD PRESSURE: 136 MMHG | HEART RATE: 59 BPM

## 2024-04-30 DIAGNOSIS — I73.9 PAD (PERIPHERAL ARTERY DISEASE) (HCC): ICD-10-CM

## 2024-04-30 DIAGNOSIS — I25.10 CORONARY ARTERY DISEASE INVOLVING NATIVE CORONARY ARTERY OF NATIVE HEART WITHOUT ANGINA PECTORIS: Primary | ICD-10-CM

## 2024-04-30 DIAGNOSIS — I10 PRIMARY HYPERTENSION: ICD-10-CM

## 2024-04-30 DIAGNOSIS — E78.01 FAMILIAL HYPERCHOLESTEROLEMIA: ICD-10-CM

## 2024-04-30 DIAGNOSIS — I48.0 PAROXYSMAL ATRIAL FIBRILLATION (HCC): ICD-10-CM

## 2024-04-30 PROCEDURE — 1123F ACP DISCUSS/DSCN MKR DOCD: CPT | Performed by: NUCLEAR MEDICINE

## 2024-04-30 PROCEDURE — G8417 CALC BMI ABV UP PARAM F/U: HCPCS | Performed by: NUCLEAR MEDICINE

## 2024-04-30 PROCEDURE — G8427 DOCREV CUR MEDS BY ELIG CLIN: HCPCS | Performed by: NUCLEAR MEDICINE

## 2024-04-30 PROCEDURE — 93000 ELECTROCARDIOGRAM COMPLETE: CPT | Performed by: NUCLEAR MEDICINE

## 2024-04-30 PROCEDURE — 3075F SYST BP GE 130 - 139MM HG: CPT | Performed by: NUCLEAR MEDICINE

## 2024-04-30 PROCEDURE — 1036F TOBACCO NON-USER: CPT | Performed by: NUCLEAR MEDICINE

## 2024-04-30 PROCEDURE — 99214 OFFICE O/P EST MOD 30 MIN: CPT | Performed by: NUCLEAR MEDICINE

## 2024-04-30 PROCEDURE — 3078F DIAST BP <80 MM HG: CPT | Performed by: NUCLEAR MEDICINE

## 2024-04-30 PROCEDURE — 3017F COLORECTAL CA SCREEN DOC REV: CPT | Performed by: NUCLEAR MEDICINE

## 2024-04-30 NOTE — PROGRESS NOTES
[facility] [place] [facility]  [facility] CARDIOLOGY  [address]  [address]  [facility] [address]  Dept: [phone]  Dept Fax: [phone]  Loc: [phone]    Visit Date: 4/30/2024    James Waters is a 74 y.o. male who presents todayfor:  Chief Complaint   Patient presents with    Check-Up    Atrial Fibrillation    Hypertension    Coronary Artery Disease    Hyperlipidemia   Known CAd and stents  Seems to be stable  No chest pain   No use of nitro  No changes in breathing  Also known PAF  Seems stable  No recent episodes  No bleeding issues  BP is stable  No dizziness  No syncope  On Praluent        HPI:  HPI  Past Medical History:   Diagnosis Date    A-fib ([facility])     Acquired hypothyroidism 02/21/2019    Atrial fibrillation with RVR ([facility]) 10/09/2019    CAD (coronary artery disease)     Cancer ([facility])     SKIN    DDD (degenerative disc disease), cervical 04/10/2017    GERD (gastroesophageal reflux disease)     Gout     Hyperlipidemia     Hypertension     Obesity (BMI 30.0-34.9) 02/14/2019    PAD (peripheral artery disease) ([facility]) 12/19/2019      Past Surgical History:   Procedure Laterality Date    CARDIAC CATHETERIZATION  08/20/2004    EF 60%    CARDIAC CATHETERIZATION  03/01/2003    EF 60%    CARDIOVASCULAR STRESS TEST  02/25/2010    EF 63%    CARDIOVASCULAR STRESS TEST  11/17/2006    EF 68%    CARDIOVASCULAR STRESS TEST  08/18/2005    EF 48%    CARDIOVASCULAR STRESS TEST  07/27/2004    EF 63%    CARDIOVASCULAR STRESS TEST  03/18/2003    EF 43%    CAROTID STENT PLACEMENT      x3    DIAGNOSTIC CARDIAC CATH LAB PROCEDURE  08/18/2000    EF 70%    PTCA  07/19/2006    CYPHER STENT TO MID RCA    SKIN CANCER EXCISION  10/30/2023    TRANSTHORACIC ECHOCARDIOGRAM  02/25/2010    EF 55-65%     Family History   Problem Relation Age of Onset    High Blood Pressure Mother     High Blood Pressure Father     Diabetes Sister     Heart Disease Maternal Grandmother     High Blood Pressure Maternal

## 2024-05-09 NOTE — PROGRESS NOTES
Writer reviewed with patient and spouse discharge instructions including follow up appointments, medication education, and care for site. Verbalized understanding. Patient to discharge home. Her/She

## 2024-06-19 ENCOUNTER — OFFICE VISIT (OUTPATIENT)
Dept: FAMILY MEDICINE CLINIC | Age: 74
End: 2024-06-19

## 2024-06-19 VITALS
DIASTOLIC BLOOD PRESSURE: 60 MMHG | BODY MASS INDEX: 28.29 KG/M2 | SYSTOLIC BLOOD PRESSURE: 118 MMHG | WEIGHT: 191.6 LBS | HEART RATE: 56 BPM | RESPIRATION RATE: 12 BRPM

## 2024-06-19 DIAGNOSIS — D68.69 SECONDARY HYPERCOAGULABLE STATE (HCC): ICD-10-CM

## 2024-06-19 DIAGNOSIS — I20.89 EXERCISE-INDUCED ANGINA (HCC): ICD-10-CM

## 2024-06-19 DIAGNOSIS — E11.9 TYPE 2 DIABETES MELLITUS WITHOUT COMPLICATION, WITHOUT LONG-TERM CURRENT USE OF INSULIN (HCC): ICD-10-CM

## 2024-06-19 DIAGNOSIS — R90.82 WHITE MATTER ABNORMALITY ON MRI OF BRAIN: ICD-10-CM

## 2024-06-19 DIAGNOSIS — R41.0 CONFUSION AND DISORIENTATION: Primary | ICD-10-CM

## 2024-06-19 DIAGNOSIS — Z86.73 HISTORY OF CVA (CEREBROVASCULAR ACCIDENT): ICD-10-CM

## 2024-06-19 SDOH — ECONOMIC STABILITY: FOOD INSECURITY: WITHIN THE PAST 12 MONTHS, YOU WORRIED THAT YOUR FOOD WOULD RUN OUT BEFORE YOU GOT MONEY TO BUY MORE.: NEVER TRUE

## 2024-06-19 SDOH — ECONOMIC STABILITY: FOOD INSECURITY: WITHIN THE PAST 12 MONTHS, THE FOOD YOU BOUGHT JUST DIDN'T LAST AND YOU DIDN'T HAVE MONEY TO GET MORE.: NEVER TRUE

## 2024-06-19 SDOH — ECONOMIC STABILITY: INCOME INSECURITY: HOW HARD IS IT FOR YOU TO PAY FOR THE VERY BASICS LIKE FOOD, HOUSING, MEDICAL CARE, AND HEATING?: NOT HARD AT ALL

## 2024-06-19 ASSESSMENT — PATIENT HEALTH QUESTIONNAIRE - PHQ9
SUM OF ALL RESPONSES TO PHQ QUESTIONS 1-9: 0
SUM OF ALL RESPONSES TO PHQ9 QUESTIONS 1 & 2: 0
1. LITTLE INTEREST OR PLEASURE IN DOING THINGS: NOT AT ALL
SUM OF ALL RESPONSES TO PHQ QUESTIONS 1-9: 0
2. FEELING DOWN, DEPRESSED OR HOPELESS: NOT AT ALL

## 2024-06-19 ASSESSMENT — ENCOUNTER SYMPTOMS
NAUSEA: 0
SHORTNESS OF BREATH: 0
COUGH: 0
ABDOMINAL PAIN: 0

## 2024-06-19 NOTE — PROGRESS NOTES
James Waters (1950) 74 y.o. male here for evaluation of the following chief complaint(s):      HPI:  Chief Complaint   Patient presents with    Altered Mental Status     Confusion, memory loss x \"a little while\"     Concerns with episodes of confusion.  Yesterday went to [place] [employer] and came home and did not remember why he went or conversations he had.  Wife has been noticing some short term remembrance.   Episode in past issues with remembering directions to daughters house where he has been multiple times.     Denies anxiety or depression.  Denies high stress.     Has seen [facility] in past for confusion and syncope.   MRI as below.  EEG 2023 was normal.     MRI Brain 11/20/23:  IMPRESSION:     1. Stable MRI scan of the brain, no interval change since previous study dated 5/23/2023.  2. Mild atrophy and probable ischemic changes in the white matter. No evidence for an acute infarct.  3. 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  4. Inflammatory changes in the frontal sinuses and ethmoid air cells bilaterally    Vitals:    06/19/24 0944   BP: 118/60   Pulse: 56   Resp: 12       Patient Active Problem List   Diagnosis    Coronary artery disease involving native coronary artery of native heart with unstable angina pectoris (HCC)    Hypertension    Hyperlipidemia    Sleep apnea    Hemorrhoids    GERD (gastroesophageal reflux disease)    Pharyngitis, acute    Back pain    Urinary frequency    Hematuria, undiagnosed cause    OAB (overactive bladder)    BPH associated with nocturia    Tracheobronchitis    Cervical muscle pain    Acute cervical myofascial strain, secondary to fall    DDD (degenerative disc disease), cervical    Skin tag, buttocks.    Chest pain    Angina effort    Dyslipidemia    S/P angioplasty with stent    Erectile dysfunction due to arterial insufficiency    Primary insomnia    Medication monitoring encounter    Bilateral tinnitus    Acquired hypothyroidism    Atrial fibrillation with

## 2024-06-19 NOTE — PROGRESS NOTES
Referral faxed to [facility] at #[phone].  Patient aware they will contact him to schedule appt.

## 2024-06-24 RX ORDER — AMLODIPINE BESYLATE 5 MG/1
5 TABLET ORAL 2 TIMES DAILY
Qty: 180 TABLET | Refills: 3 | Status: SHIPPED | OUTPATIENT
Start: 2024-06-24

## 2024-06-26 ENCOUNTER — TELEPHONE (OUTPATIENT)
Dept: FAMILY MEDICINE CLINIC | Age: 74
End: 2024-06-26

## 2024-06-26 NOTE — TELEPHONE ENCOUNTER
Wife Cristina on HIPAA calling to check on status of referral to [facility].  She attempted to call them but couldn't speak with anyone.  Advised to call referral status # [phone].  She will call [facility]

## 2024-07-09 ENCOUNTER — PATIENT MESSAGE (OUTPATIENT)
Dept: FAMILY MEDICINE CLINIC | Age: 74
End: 2024-07-09

## 2024-07-09 DIAGNOSIS — Z86.73 HISTORY OF CVA (CEREBROVASCULAR ACCIDENT): ICD-10-CM

## 2024-07-09 DIAGNOSIS — R41.0 CONFUSION AND DISORIENTATION: Primary | ICD-10-CM

## 2024-07-09 DIAGNOSIS — R90.82 WHITE MATTER ABNORMALITY ON MRI OF BRAIN: ICD-10-CM

## 2024-07-09 NOTE — TELEPHONE ENCOUNTER
From: James Waters  To: Diego FABI Rebecca  Sent: 7/9/2024 11:20 AM EDT  Subject: Referral to [facility]    As of today, 7/9, we have not heard from the [facility] to set-up a visit for an assessment. I am wondering if you could also send a referral and Roland's records to [facility] for [facility] to see if Roland may get an earlier appointment with this health provider. Thank you.  Cristina Waters, wife

## 2024-07-15 ENCOUNTER — PATIENT MESSAGE (OUTPATIENT)
Dept: FAMILY MEDICINE CLINIC | Age: 74
End: 2024-07-15

## 2024-07-15 DIAGNOSIS — R90.82 WHITE MATTER ABNORMALITY ON MRI OF BRAIN: ICD-10-CM

## 2024-07-15 DIAGNOSIS — R41.0 CONFUSION AND DISORIENTATION: Primary | ICD-10-CM

## 2024-07-15 DIAGNOSIS — Z86.73 HISTORY OF CVA (CEREBROVASCULAR ACCIDENT): ICD-10-CM

## 2024-07-15 NOTE — TELEPHONE ENCOUNTER
From: James Waters  To: Diego Fernandez  Sent: 7/15/2024 3:11 PM EDT  Subject: Referral    Hello,  Per our last email message exchange, I thought you were referring my  Roland to the [facility] for a follow-up since we had not yet heard from [facility], (you started that referral on 6/19). I called the [facility] today and they said they did not have the referral. Their phone #: [phone]. The [facility] said you might have sent it to [facility]? Roland's current problem is confusion, memory issues, forgetting directions to familiar places. Please help with this referral.  Thank you,  Cristina Waters, wife

## 2024-08-14 NOTE — TELEPHONE ENCOUNTER
Received a call from patient wife and she stated that she spoke with D[facility] and they stated that they received the referral but that the referral needs to say to schedule with [facility].   Please update referral and we will re-fax referral to [phone] which is the correct fax number.

## 2024-08-26 DIAGNOSIS — E03.9 ACQUIRED HYPOTHYROIDISM: ICD-10-CM

## 2024-08-26 RX ORDER — LEVOTHYROXINE SODIUM 100 MCG
TABLET ORAL
Qty: 90 TABLET | Refills: 3 | Status: SHIPPED | OUTPATIENT
Start: 2024-08-26

## 2024-09-16 ENCOUNTER — TELEPHONE (OUTPATIENT)
Dept: FAMILY MEDICINE CLINIC | Age: 74
End: 2024-09-16

## 2024-10-24 RX ORDER — ATENOLOL 25 MG/1
12.5 TABLET ORAL 2 TIMES DAILY
Qty: 90 TABLET | Refills: 3 | Status: SHIPPED | OUTPATIENT
Start: 2024-10-24

## 2024-10-24 NOTE — TELEPHONE ENCOUNTER
James Waters called requesting a refill on the following medications:  Requested Prescriptions     Pending Prescriptions Disp Refills    atenolol (TENORMIN) 25 MG tablet 90 tablet 3     Sig: Take 0.5 tablets by mouth 2 times daily     Pharmacy verified:  [facility] [address] [place] [address] -  [phone] - F [phone]     Date of last visit: 04/30/2024  Date of next visit (if applicable): 05/07/2025      *patient would like this as a 90 day prescription

## 2024-11-22 DIAGNOSIS — I73.9 PAD (PERIPHERAL ARTERY DISEASE) (HCC): ICD-10-CM

## 2024-11-22 DIAGNOSIS — I48.0 PAF (PAROXYSMAL ATRIAL FIBRILLATION) (HCC): ICD-10-CM

## 2024-11-22 DIAGNOSIS — E78.1 HYPERTRIGLYCERIDEMIA: ICD-10-CM

## 2024-11-22 RX ORDER — FENOFIBRATE 160 MG/1
160 TABLET ORAL DAILY
Qty: 90 TABLET | Refills: 3 | Status: SHIPPED | OUTPATIENT
Start: 2024-11-22

## 2024-11-22 RX ORDER — CLOPIDOGREL BISULFATE 75 MG/1
75 TABLET ORAL DAILY
Qty: 90 TABLET | Refills: 2 | Status: SHIPPED | OUTPATIENT
Start: 2024-11-22

## 2024-12-19 RX ORDER — FOLIC ACID 1 MG/1
1000 TABLET ORAL DAILY
Qty: 90 TABLET | Refills: 3 | Status: SHIPPED | OUTPATIENT
Start: 2024-12-19

## 2024-12-19 NOTE — TELEPHONE ENCOUNTER
James Waters called requesting a refill on the following medications:  Requested Prescriptions     Pending Prescriptions Disp Refills    folic acid (FOLVITE) 1 MG tablet [Pharmacy Med Name: Folic Acid 1 MG Oral Tablet] 90 tablet 0     Sig: Take 1 tablet by mouth once daily       Date of last visit: 12/18/2023  Date of next visit (if applicable):Visit date not found  Date of last refill: 12/21/23  Pharmacy Name: Cynthia [facility] LPN

## 2025-01-26 ENCOUNTER — HOSPITAL ENCOUNTER (INPATIENT)
Age: 75
LOS: 1 days | Discharge: HOME | DRG: 309 | End: 2025-01-27
Attending: STUDENT IN AN ORGANIZED HEALTH CARE EDUCATION/TRAINING PROGRAM | Admitting: STUDENT IN AN ORGANIZED HEALTH CARE EDUCATION/TRAINING PROGRAM
Payer: MEDICARE

## 2025-01-26 ENCOUNTER — APPOINTMENT (OUTPATIENT)
Dept: GENERAL RADIOLOGY | Age: 75
DRG: 309 | End: 2025-01-26
Payer: MEDICARE

## 2025-01-26 DIAGNOSIS — N17.9 AKI (ACUTE KIDNEY INJURY) (HCC): ICD-10-CM

## 2025-01-26 DIAGNOSIS — I48.91 ATRIAL FIBRILLATION, UNSPECIFIED TYPE (HCC): ICD-10-CM

## 2025-01-26 DIAGNOSIS — I48.91 ATRIAL FIBRILLATION WITH RVR (HCC): Primary | ICD-10-CM

## 2025-01-26 LAB
ALBUMIN SERPL BCG-MCNC: 4.3 G/DL (ref 3.5–5.1)
ALP SERPL-CCNC: 48 U/L (ref 38–126)
ALT SERPL W/O P-5'-P-CCNC: 13 U/L (ref 11–66)
ANION GAP SERPL CALC-SCNC: 16 MEQ/L (ref 8–16)
AST SERPL-CCNC: 19 U/L (ref 5–40)
BASOPHILS ABSOLUTE: 0.1 THOU/MM3 (ref 0–0.1)
BASOPHILS NFR BLD AUTO: 1 %
BILIRUB SERPL-MCNC: 0.3 MG/DL (ref 0.3–1.2)
BUN SERPL-MCNC: 26 MG/DL (ref 7–22)
CALCIUM SERPL-MCNC: 9.7 MG/DL (ref 8.5–10.5)
CHLORIDE SERPL-SCNC: 101 MEQ/L (ref 98–111)
CO2 SERPL-SCNC: 22 MEQ/L (ref 23–33)
CREAT SERPL-MCNC: 1.8 MG/DL (ref 0.4–1.2)
DEPRECATED RDW RBC AUTO: 41.7 FL (ref 35–45)
EOSINOPHIL NFR BLD AUTO: 2.6 %
EOSINOPHILS ABSOLUTE: 0.3 THOU/MM3 (ref 0–0.4)
ERYTHROCYTE [DISTWIDTH] IN BLOOD BY AUTOMATED COUNT: 12.8 % (ref 11.5–14.5)
FLUAV RNA RESP QL NAA+PROBE: NOT DETECTED
FLUBV RNA RESP QL NAA+PROBE: NOT DETECTED
GFR SERPL CREATININE-BSD FRML MDRD: 39 ML/MIN/1.73M2
GLUCOSE SERPL-MCNC: 131 MG/DL (ref 70–108)
HCT VFR BLD AUTO: 45.2 % (ref 42–52)
HGB BLD-MCNC: 15.2 GM/DL (ref 14–18)
IMM GRANULOCYTES # BLD AUTO: 0.03 THOU/MM3 (ref 0–0.07)
IMM GRANULOCYTES NFR BLD AUTO: 0.3 %
LYMPHOCYTES ABSOLUTE: 2.7 THOU/MM3 (ref 1–4.8)
LYMPHOCYTES NFR BLD AUTO: 27.3 %
MAGNESIUM SERPL-MCNC: 2.3 MG/DL (ref 1.6–2.4)
MCH RBC QN AUTO: 29.8 PG (ref 26–33)
MCHC RBC AUTO-ENTMCNC: 33.6 GM/DL (ref 32.2–35.5)
MCV RBC AUTO: 88.6 FL (ref 80–94)
MONOCYTES ABSOLUTE: 0.7 THOU/MM3 (ref 0.4–1.3)
MONOCYTES NFR BLD AUTO: 6.7 %
NEUTROPHILS ABSOLUTE: 6.1 THOU/MM3 (ref 1.8–7.7)
NEUTROPHILS NFR BLD AUTO: 62.1 %
NRBC BLD AUTO-RTO: 0 /100 WBC
NT-PROBNP SERPL IA-MCNC: 55.1 PG/ML (ref 0–124)
OSMOLALITY SERPL CALC.SUM OF ELEC: 284.1 MOSMOL/KG (ref 275–300)
PLATELET # BLD AUTO: 244 THOU/MM3 (ref 130–400)
PMV BLD AUTO: 10.7 FL (ref 9.4–12.4)
POTASSIUM SERPL-SCNC: 3.9 MEQ/L (ref 3.5–5.2)
PROT SERPL-MCNC: 7.3 G/DL (ref 6.1–8)
RBC # BLD AUTO: 5.1 MILL/MM3 (ref 4.7–6.1)
SARS-COV-2 RNA RESP QL NAA+PROBE: NOT DETECTED
SODIUM SERPL-SCNC: 139 MEQ/L (ref 135–145)
TROPONIN, HIGH SENSITIVITY: 13 NG/L (ref 0–12)
TSH SERPL DL<=0.005 MIU/L-ACNC: 5.82 UIU/ML (ref 0.4–4.2)
WBC # BLD AUTO: 9.8 THOU/MM3 (ref 4.8–10.8)

## 2025-01-26 PROCEDURE — 83735 ASSAY OF MAGNESIUM: CPT

## 2025-01-26 PROCEDURE — 2580000003 HC RX 258: Performed by: EMERGENCY MEDICINE

## 2025-01-26 PROCEDURE — 84439 ASSAY OF FREE THYROXINE: CPT

## 2025-01-26 PROCEDURE — 87636 SARSCOV2 & INF A&B AMP PRB: CPT

## 2025-01-26 PROCEDURE — 80053 COMPREHEN METABOLIC PANEL: CPT

## 2025-01-26 PROCEDURE — 96374 THER/PROPH/DIAG INJ IV PUSH: CPT

## 2025-01-26 PROCEDURE — 99285 EMERGENCY DEPT VISIT HI MDM: CPT

## 2025-01-26 PROCEDURE — 84484 ASSAY OF TROPONIN QUANT: CPT

## 2025-01-26 PROCEDURE — 93005 ELECTROCARDIOGRAM TRACING: CPT | Performed by: STUDENT IN AN ORGANIZED HEALTH CARE EDUCATION/TRAINING PROGRAM

## 2025-01-26 PROCEDURE — 71045 X-RAY EXAM CHEST 1 VIEW: CPT

## 2025-01-26 PROCEDURE — 83880 ASSAY OF NATRIURETIC PEPTIDE: CPT

## 2025-01-26 PROCEDURE — 84443 ASSAY THYROID STIM HORMONE: CPT

## 2025-01-26 PROCEDURE — 36415 COLL VENOUS BLD VENIPUNCTURE: CPT

## 2025-01-26 PROCEDURE — 85025 COMPLETE CBC W/AUTO DIFF WBC: CPT

## 2025-01-26 PROCEDURE — 2500000003 HC RX 250 WO HCPCS: Performed by: EMERGENCY MEDICINE

## 2025-01-26 RX ORDER — 0.9 % SODIUM CHLORIDE 0.9 %
1000 INTRAVENOUS SOLUTION INTRAVENOUS ONCE
Status: COMPLETED | OUTPATIENT
Start: 2025-01-26 | End: 2025-01-27

## 2025-01-26 RX ORDER — DILTIAZEM HYDROCHLORIDE 5 MG/ML
10 INJECTION INTRAVENOUS ONCE
Status: COMPLETED | OUTPATIENT
Start: 2025-01-26 | End: 2025-01-26

## 2025-01-26 RX ADMIN — DILTIAZEM HYDROCHLORIDE 10 MG: 5 INJECTION, SOLUTION INTRAVENOUS at 23:39

## 2025-01-26 RX ADMIN — SODIUM CHLORIDE 1000 ML: 9 INJECTION, SOLUTION INTRAVENOUS at 23:38

## 2025-01-26 RX ADMIN — DILTIAZEM HYDROCHLORIDE 5 MG/HR: 5 INJECTION, SOLUTION INTRAVENOUS at 23:47

## 2025-01-26 ASSESSMENT — PAIN - FUNCTIONAL ASSESSMENT
PAIN_FUNCTIONAL_ASSESSMENT: NONE - DENIES PAIN
PAIN_FUNCTIONAL_ASSESSMENT: NONE - DENIES PAIN

## 2025-01-27 ENCOUNTER — APPOINTMENT (OUTPATIENT)
Age: 75
DRG: 309 | End: 2025-01-27
Payer: MEDICARE

## 2025-01-27 VITALS
HEIGHT: 71 IN | BODY MASS INDEX: 26.96 KG/M2 | DIASTOLIC BLOOD PRESSURE: 121 MMHG | SYSTOLIC BLOOD PRESSURE: 137 MMHG | OXYGEN SATURATION: 91 % | TEMPERATURE: 97.4 F | RESPIRATION RATE: 13 BRPM | HEART RATE: 88 BPM | WEIGHT: 192.6 LBS

## 2025-01-27 PROBLEM — N17.9 AKI (ACUTE KIDNEY INJURY) (HCC): Status: ACTIVE | Noted: 2025-01-27

## 2025-01-27 LAB
ANION GAP SERPL CALC-SCNC: 12 MEQ/L (ref 8–16)
BACTERIA: NORMAL
BASOPHILS ABSOLUTE: 0.1 THOU/MM3 (ref 0–0.1)
BASOPHILS NFR BLD AUTO: 0.9 %
BILIRUB UR QL STRIP: NEGATIVE
BUN SERPL-MCNC: 22 MG/DL (ref 7–22)
CALCIUM SERPL-MCNC: 9.2 MG/DL (ref 8.5–10.5)
CASTS #/AREA URNS LPF: NORMAL /LPF
CASTS #/AREA URNS LPF: NORMAL /LPF
CHARACTER UR: CLEAR
CHARCOAL URNS QL MICRO: NORMAL
CHLORIDE SERPL-SCNC: 101 MEQ/L (ref 98–111)
CO2 SERPL-SCNC: 24 MEQ/L (ref 23–33)
COLOR UR: YELLOW
CREAT SERPL-MCNC: 1.4 MG/DL (ref 0.4–1.2)
CREAT SERPL-MCNC: 1.5 MG/DL (ref 0.4–1.2)
CREAT UR-MCNC: 77.8 MG/DL
CRYSTALS URNS QL MICRO: NORMAL
DEPRECATED RDW RBC AUTO: 42.2 FL (ref 35–45)
ECHO AO ASC DIAM: 3.2 CM
ECHO AO ASCENDING AORTA INDEX: 1.54 CM/M2
ECHO AO SINUS VALSALVA DIAM: 3.1 CM
ECHO AO SINUS VALSALVA INDEX: 1.49 CM/M2
ECHO AO ST JNCT DIAM: 2.4 CM
ECHO AR MAX VEL PISA: 3.7 M/S
ECHO AV CUSP MM: 1.6 CM
ECHO AV MEAN GRADIENT: 5 MMHG
ECHO AV MEAN VELOCITY: 1 M/S
ECHO AV PEAK GRADIENT: 9 MMHG
ECHO AV PEAK VELOCITY: 1.5 M/S
ECHO AV REGURGITANT PHT: 873 MS
ECHO AV VELOCITY RATIO: 0.73
ECHO AV VTI: 30.4 CM
ECHO BSA: 2.09 M2
ECHO EST RA PRESSURE: 3 MMHG
ECHO LA AREA 2C: 15.2 CM2
ECHO LA AREA 4C: 16.1 CM2
ECHO LA DIAMETER INDEX: 1.59 CM/M2
ECHO LA DIAMETER: 3.3 CM
ECHO LA MAJOR AXIS: 5.1 CM
ECHO LA MINOR AXIS: 5.4 CM
ECHO LA VOL BP: 39 ML (ref 18–58)
ECHO LA VOL MOD A2C: 36 ML (ref 18–58)
ECHO LA VOL MOD A4C: 40 ML (ref 18–58)
ECHO LA VOL/BSA BIPLANE: 19 ML/M2 (ref 16–34)
ECHO LA VOLUME INDEX MOD A2C: 17 ML/M2 (ref 16–34)
ECHO LA VOLUME INDEX MOD A4C: 19 ML/M2 (ref 16–34)
ECHO LV E' LATERAL VELOCITY: 6.7 CM/S
ECHO LV E' SEPTAL VELOCITY: 7.9 CM/S
ECHO LV EJECTION FRACTION BIPLANE: 55 % (ref 55–100)
ECHO LV FRACTIONAL SHORTENING: 31 % (ref 28–44)
ECHO LV INTERNAL DIMENSION DIASTOLE INDEX: 2.16 CM/M2
ECHO LV INTERNAL DIMENSION DIASTOLIC: 4.5 CM (ref 4.2–5.9)
ECHO LV INTERNAL DIMENSION SYSTOLIC INDEX: 1.49 CM/M2
ECHO LV INTERNAL DIMENSION SYSTOLIC: 3.1 CM
ECHO LV ISOVOLUMETRIC RELAXATION TIME (IVRT): 85 MS
ECHO LV IVSD: 1.1 CM (ref 0.6–1)
ECHO LV MASS 2D: 175 G (ref 88–224)
ECHO LV MASS INDEX 2D: 84.1 G/M2 (ref 49–115)
ECHO LV POSTERIOR WALL DIASTOLIC: 1.1 CM (ref 0.6–1)
ECHO LV RELATIVE WALL THICKNESS RATIO: 0.49
ECHO LVOT AV VTI INDEX: 0.8
ECHO LVOT MEAN GRADIENT: 2 MMHG
ECHO LVOT PEAK GRADIENT: 5 MMHG
ECHO LVOT PEAK VELOCITY: 1.1 M/S
ECHO LVOT VTI: 24.4 CM
ECHO MV A VELOCITY: 0.77 M/S
ECHO MV E DECELERATION TIME (DT): 316 MS
ECHO MV E VELOCITY: 0.57 M/S
ECHO MV E/A RATIO: 0.74
ECHO MV E/E' LATERAL: 8.51
ECHO MV E/E' RATIO (AVERAGED): 7.86
ECHO MV E/E' SEPTAL: 7.22
ECHO PV MAX VELOCITY: 0.9 M/S
ECHO PV PEAK GRADIENT: 3 MMHG
ECHO RV FREE WALL PEAK S': 12.7 CM/S
ECHO RV INTERNAL DIMENSION: 3.4 CM
ECHO RV TAPSE: 2 CM (ref 1.7–?)
ECHO TV E WAVE: 0.4 M/S
EKG ATRIAL RATE: 47 BPM
EKG P AXIS: 31 DEGREES
EKG P-R INTERVAL: 220 MS
EKG Q-T INTERVAL: 322 MS
EKG Q-T INTERVAL: 330 MS
EKG Q-T INTERVAL: 458 MS
EKG QRS DURATION: 80 MS
EKG QRS DURATION: 80 MS
EKG QRS DURATION: 82 MS
EKG QTC CALCULATION (BAZETT): 405 MS
EKG QTC CALCULATION (BAZETT): 433 MS
EKG QTC CALCULATION (BAZETT): 457 MS
EKG R AXIS: 1 DEGREES
EKG R AXIS: 27 DEGREES
EKG R AXIS: 47 DEGREES
EKG T AXIS: 28 DEGREES
EKG T AXIS: 37 DEGREES
EKG T AXIS: 63 DEGREES
EKG VENTRICULAR RATE: 104 BPM
EKG VENTRICULAR RATE: 121 BPM
EKG VENTRICULAR RATE: 47 BPM
EOSINOPHIL NFR BLD AUTO: 1.5 %
EOSINOPHILS ABSOLUTE: 0.1 THOU/MM3 (ref 0–0.4)
EPITHELIAL CELLS, UA: NORMAL /HPF
ERYTHROCYTE [DISTWIDTH] IN BLOOD BY AUTOMATED COUNT: 13 % (ref 11.5–14.5)
GFR SERPL CREATININE-BSD FRML MDRD: 49 ML/MIN/1.73M2
GFR SERPL CREATININE-BSD FRML MDRD: 53 ML/MIN/1.73M2
GLUCOSE SERPL-MCNC: 119 MG/DL (ref 70–108)
GLUCOSE UR QL STRIP.AUTO: NEGATIVE MG/DL
HCT VFR BLD AUTO: 41.7 % (ref 42–52)
HGB BLD-MCNC: 14 GM/DL (ref 14–18)
HGB UR QL STRIP.AUTO: NEGATIVE
IMM GRANULOCYTES # BLD AUTO: 0.02 THOU/MM3 (ref 0–0.07)
IMM GRANULOCYTES NFR BLD AUTO: 0.2 %
INR PPP: 1.29 (ref 0.85–1.13)
KETONES UR QL STRIP.AUTO: NEGATIVE
LEUKOCYTE ESTERASE UR QL STRIP.AUTO: NEGATIVE
LYMPHOCYTES ABSOLUTE: 1.7 THOU/MM3 (ref 1–4.8)
LYMPHOCYTES NFR BLD AUTO: 18.1 %
MCH RBC QN AUTO: 30 PG (ref 26–33)
MCHC RBC AUTO-ENTMCNC: 33.6 GM/DL (ref 32.2–35.5)
MCV RBC AUTO: 89.5 FL (ref 80–94)
MONOCYTES ABSOLUTE: 0.7 THOU/MM3 (ref 0.4–1.3)
MONOCYTES NFR BLD AUTO: 7.3 %
NEUTROPHILS ABSOLUTE: 6.8 THOU/MM3 (ref 1.8–7.7)
NEUTROPHILS NFR BLD AUTO: 72 %
NITRITE UR QL STRIP.AUTO: NEGATIVE
NRBC BLD AUTO-RTO: 0 /100 WBC
ORIGINAL SAMPLE NUMBER: NORMAL
OSMOLALITY UR: NORMAL MOSMOL/KG (ref 250–750)
PH UR STRIP.AUTO: 6 [PH] (ref 5–9)
PLATELET # BLD AUTO: 220 THOU/MM3 (ref 130–400)
PMV BLD AUTO: 10.5 FL (ref 9.4–12.4)
POTASSIUM SERPL-SCNC: 4 MEQ/L (ref 3.5–5.2)
PROT UR STRIP.AUTO-MCNC: NEGATIVE MG/DL
RBC # BLD AUTO: 4.66 MILL/MM3 (ref 4.7–6.1)
RBC #/AREA URNS HPF: NORMAL /HPF
REFERENCE LOCATION: NORMAL
REFERENCE RANGE: NORMAL
RENAL EPI CELLS #/AREA URNS HPF: NORMAL /[HPF]
SODIUM SERPL-SCNC: 137 MEQ/L (ref 135–145)
SODIUM UR-SCNC: 93 MEQ/L
SPECIFIC GRAVITY UA: 1.01 (ref 1–1.03)
T4 FREE SERPL-MCNC: 1.37 NG/DL (ref 0.93–1.68)
TEST RESULTS WITH UNITS: NORMAL
TEST(S) BEING PERFORMED: NORMAL
TROPONIN, HIGH SENSITIVITY: 16 NG/L (ref 0–12)
UROBILINOGEN, URINE: 0.2 EU/DL (ref 0–1)
WBC # BLD AUTO: 9.4 THOU/MM3 (ref 4.8–10.8)
WBC #/AREA URNS HPF: NORMAL /HPF
YEAST LIKE FUNGI URNS QL MICRO: NORMAL

## 2025-01-27 PROCEDURE — 80048 BASIC METABOLIC PNL TOTAL CA: CPT

## 2025-01-27 PROCEDURE — 83935 ASSAY OF URINE OSMOLALITY: CPT

## 2025-01-27 PROCEDURE — 93010 ELECTROCARDIOGRAM REPORT: CPT | Performed by: INTERNAL MEDICINE

## 2025-01-27 PROCEDURE — 81001 URINALYSIS AUTO W/SCOPE: CPT

## 2025-01-27 PROCEDURE — 93005 ELECTROCARDIOGRAM TRACING: CPT

## 2025-01-27 PROCEDURE — 2060000000 HC ICU INTERMEDIATE R&B

## 2025-01-27 PROCEDURE — 36415 COLL VENOUS BLD VENIPUNCTURE: CPT

## 2025-01-27 PROCEDURE — 93306 TTE W/DOPPLER COMPLETE: CPT

## 2025-01-27 PROCEDURE — 99223 1ST HOSP IP/OBS HIGH 75: CPT

## 2025-01-27 PROCEDURE — 2580000003 HC RX 258

## 2025-01-27 PROCEDURE — 6370000000 HC RX 637 (ALT 250 FOR IP)

## 2025-01-27 PROCEDURE — 82565 ASSAY OF CREATININE: CPT

## 2025-01-27 PROCEDURE — 99223 1ST HOSP IP/OBS HIGH 75: CPT | Performed by: INTERNAL MEDICINE

## 2025-01-27 PROCEDURE — 99239 HOSP IP/OBS DSCHRG MGMT >30: CPT | Performed by: INTERNAL MEDICINE

## 2025-01-27 PROCEDURE — 93306 TTE W/DOPPLER COMPLETE: CPT | Performed by: INTERNAL MEDICINE

## 2025-01-27 PROCEDURE — 82570 ASSAY OF URINE CREATININE: CPT

## 2025-01-27 PROCEDURE — 93005 ELECTROCARDIOGRAM TRACING: CPT | Performed by: INTERNAL MEDICINE

## 2025-01-27 PROCEDURE — 84300 ASSAY OF URINE SODIUM: CPT

## 2025-01-27 PROCEDURE — 84484 ASSAY OF TROPONIN QUANT: CPT

## 2025-01-27 PROCEDURE — 85610 PROTHROMBIN TIME: CPT

## 2025-01-27 PROCEDURE — 85025 COMPLETE CBC W/AUTO DIFF WBC: CPT

## 2025-01-27 RX ORDER — ACETAMINOPHEN 650 MG/1
650 SUPPOSITORY RECTAL EVERY 6 HOURS PRN
Status: DISCONTINUED | OUTPATIENT
Start: 2025-01-27 | End: 2025-01-27 | Stop reason: HOSPADM

## 2025-01-27 RX ORDER — TAMSULOSIN HYDROCHLORIDE 0.4 MG/1
0.4 CAPSULE ORAL NIGHTLY
Status: DISCONTINUED | OUTPATIENT
Start: 2025-01-27 | End: 2025-01-27 | Stop reason: HOSPADM

## 2025-01-27 RX ORDER — ATENOLOL 25 MG/1
12.5 TABLET ORAL 2 TIMES DAILY
Status: DISCONTINUED | OUTPATIENT
Start: 2025-01-27 | End: 2025-01-27 | Stop reason: HOSPADM

## 2025-01-27 RX ORDER — ONDANSETRON 4 MG/1
4 TABLET, ORALLY DISINTEGRATING ORAL EVERY 8 HOURS PRN
Status: DISCONTINUED | OUTPATIENT
Start: 2025-01-27 | End: 2025-01-27 | Stop reason: HOSPADM

## 2025-01-27 RX ORDER — POLYETHYLENE GLYCOL 3350 17 G/17G
17 POWDER, FOR SOLUTION ORAL DAILY PRN
Status: DISCONTINUED | OUTPATIENT
Start: 2025-01-27 | End: 2025-01-27 | Stop reason: HOSPADM

## 2025-01-27 RX ORDER — POTASSIUM CHLORIDE 1500 MG/1
40 TABLET, EXTENDED RELEASE ORAL PRN
Status: DISCONTINUED | OUTPATIENT
Start: 2025-01-27 | End: 2025-01-27 | Stop reason: HOSPADM

## 2025-01-27 RX ORDER — VALSARTAN 320 MG/1
320 TABLET ORAL DAILY
Status: DISCONTINUED | OUTPATIENT
Start: 2025-01-27 | End: 2025-01-27 | Stop reason: HOSPADM

## 2025-01-27 RX ORDER — FENOFIBRATE 160 MG/1
160 TABLET ORAL DAILY
Status: DISCONTINUED | OUTPATIENT
Start: 2025-01-27 | End: 2025-01-27 | Stop reason: HOSPADM

## 2025-01-27 RX ORDER — HYDROCHLOROTHIAZIDE 25 MG/1
25 TABLET ORAL DAILY
Status: DISCONTINUED | OUTPATIENT
Start: 2025-01-27 | End: 2025-01-27 | Stop reason: HOSPADM

## 2025-01-27 RX ORDER — POTASSIUM CHLORIDE 7.45 MG/ML
10 INJECTION INTRAVENOUS PRN
Status: DISCONTINUED | OUTPATIENT
Start: 2025-01-27 | End: 2025-01-27 | Stop reason: HOSPADM

## 2025-01-27 RX ORDER — CLOPIDOGREL BISULFATE 75 MG/1
75 TABLET ORAL DAILY
Status: DISCONTINUED | OUTPATIENT
Start: 2025-01-27 | End: 2025-01-27 | Stop reason: HOSPADM

## 2025-01-27 RX ORDER — SODIUM CHLORIDE 9 MG/ML
INJECTION, SOLUTION INTRAVENOUS CONTINUOUS
Status: DISCONTINUED | OUTPATIENT
Start: 2025-01-27 | End: 2025-01-27 | Stop reason: HOSPADM

## 2025-01-27 RX ORDER — LEVOTHYROXINE SODIUM 100 UG/1
100 TABLET ORAL DAILY
Status: DISCONTINUED | OUTPATIENT
Start: 2025-01-27 | End: 2025-01-27 | Stop reason: HOSPADM

## 2025-01-27 RX ORDER — ACETAMINOPHEN 325 MG/1
650 TABLET ORAL EVERY 6 HOURS PRN
Status: DISCONTINUED | OUTPATIENT
Start: 2025-01-27 | End: 2025-01-27 | Stop reason: HOSPADM

## 2025-01-27 RX ORDER — MAGNESIUM SULFATE IN WATER 40 MG/ML
2000 INJECTION, SOLUTION INTRAVENOUS PRN
Status: DISCONTINUED | OUTPATIENT
Start: 2025-01-27 | End: 2025-01-27 | Stop reason: HOSPADM

## 2025-01-27 RX ORDER — SODIUM CHLORIDE 0.9 % (FLUSH) 0.9 %
5-40 SYRINGE (ML) INJECTION PRN
Status: DISCONTINUED | OUTPATIENT
Start: 2025-01-27 | End: 2025-01-27 | Stop reason: HOSPADM

## 2025-01-27 RX ORDER — VALSARTAN AND HYDROCHLOROTHIAZIDE 320; 25 MG/1; MG/1
1 TABLET, FILM COATED ORAL DAILY
Status: DISCONTINUED | OUTPATIENT
Start: 2025-01-27 | End: 2025-01-27 | Stop reason: CLARIF

## 2025-01-27 RX ORDER — AMLODIPINE BESYLATE 5 MG/1
5 TABLET ORAL 2 TIMES DAILY
Status: DISCONTINUED | OUTPATIENT
Start: 2025-01-27 | End: 2025-01-27 | Stop reason: HOSPADM

## 2025-01-27 RX ORDER — ONDANSETRON 2 MG/ML
4 INJECTION INTRAMUSCULAR; INTRAVENOUS EVERY 6 HOURS PRN
Status: DISCONTINUED | OUTPATIENT
Start: 2025-01-27 | End: 2025-01-27 | Stop reason: HOSPADM

## 2025-01-27 RX ORDER — SODIUM CHLORIDE 0.9 % (FLUSH) 0.9 %
5-40 SYRINGE (ML) INJECTION EVERY 12 HOURS SCHEDULED
Status: DISCONTINUED | OUTPATIENT
Start: 2025-01-27 | End: 2025-01-27 | Stop reason: HOSPADM

## 2025-01-27 RX ORDER — MULTIVITAMIN WITH IRON
1 TABLET ORAL DAILY
Status: DISCONTINUED | OUTPATIENT
Start: 2025-01-27 | End: 2025-01-27 | Stop reason: HOSPADM

## 2025-01-27 RX ORDER — SODIUM CHLORIDE 9 MG/ML
INJECTION, SOLUTION INTRAVENOUS PRN
Status: DISCONTINUED | OUTPATIENT
Start: 2025-01-27 | End: 2025-01-27 | Stop reason: HOSPADM

## 2025-01-27 RX ORDER — FOLIC ACID 1 MG/1
1000 TABLET ORAL DAILY
Status: DISCONTINUED | OUTPATIENT
Start: 2025-01-27 | End: 2025-01-27 | Stop reason: HOSPADM

## 2025-01-27 RX ADMIN — FENOFIBRATE 160 MG: 160 TABLET ORAL at 08:50

## 2025-01-27 RX ADMIN — APIXABAN 5 MG: 5 TABLET, FILM COATED ORAL at 08:50

## 2025-01-27 RX ADMIN — SODIUM CHLORIDE: 9 INJECTION, SOLUTION INTRAVENOUS at 02:58

## 2025-01-27 RX ADMIN — Medication 1 TABLET: at 08:50

## 2025-01-27 RX ADMIN — LEVOTHYROXINE SODIUM 100 MCG: 0.1 TABLET ORAL at 05:58

## 2025-01-27 RX ADMIN — FOLIC ACID 1000 MCG: 1 TABLET ORAL at 08:50

## 2025-01-27 RX ADMIN — CLOPIDOGREL BISULFATE 75 MG: 75 TABLET ORAL at 08:50

## 2025-01-27 ASSESSMENT — PAIN - FUNCTIONAL ASSESSMENT
PAIN_FUNCTIONAL_ASSESSMENT: NONE - DENIES PAIN

## 2025-01-27 NOTE — ED PROVIDER NOTES
Pt Name: James Waters  MRN: [id number]  Birthdate 1950  Date of evaluation: 1/26/2025  ED Resident: Hedy Glover MD  ED Attending: Dr. Kumar    CHIEF COMPLAINT       Chief Complaint   Patient presents with    Tachycardia     History obtained from the patient.    HISTORY OF PRESENT ILLNESS    HPI  James Waters is a 74 y.o. male who presents to the emergency department for evaluation of tachycardia.    Patient states that he noticed about an hour ago that his heart was racing, he had his wife take his heart rate and she said it felt like it was irregular and fast, he checked on an alvaro and said that he might be in A-fib so he came into the ED.  Patient has known history of A-fib, takes Eliquis and Plavix.  He has been admitted once a few years ago for A-fib with RVR.  He denies shortness of breath, chest pain, nausea, vomiting, diarrhea.  He had a recent upper respiratory infection that lasted about 2 weeks but he feels like he has recovered from that.      The patient has no other acute complaints at this time.    PAST MEDICAL AND SURGICAL HISTORY     Past Medical History:   Diagnosis Date    A-fib ([facility])     Acquired hypothyroidism 02/21/2019    Atrial fibrillation with RVR ([facility]) 10/09/2019    CAD (coronary artery disease)     Cancer ([facility])     SKIN    DDD (degenerative disc disease), cervical 04/10/2017    GERD (gastroesophageal reflux disease)     Gout     Hyperlipidemia     Hypertension     Obesity (BMI 30.0-34.9) 02/14/2019    PAD (peripheral artery disease) ([facility]) 12/19/2019     Past Surgical History:   Procedure Laterality Date    CARDIAC CATHETERIZATION  08/20/2004    EF 60%    CARDIAC CATHETERIZATION  03/01/2003    EF 60%    CARDIOVASCULAR STRESS TEST  02/25/2010    EF 63%    CARDIOVASCULAR STRESS TEST  11/17/2006    EF 68%    CARDIOVASCULAR STRESS TEST  08/18/2005    EF 48%    CARDIOVASCULAR STRESS TEST  07/27/2004    EF 63%    CARDIOVASCULAR STRESS TEST  03/18/2003    EF 43%

## 2025-01-27 NOTE — ED NOTES
Pt medicated per MAR. Vitals assessed. RR easy and unlabored. IV fluids infusing. Cardizem at 5mg/hr at this time.

## 2025-01-27 NOTE — PLAN OF CARE
Name: James Waters  YOB: 1950  MRN: [id number]  Date: 01/27/25     Plan of Care      Patient converted with Cardizem drip this morning to normal sinus rhythm.  Patient was a little bradycardic in the 40s so RN held morning dose of atenolol.  Per family patients heart rate runs in the 50s to 60s.  Cardiology has been consulted and contacted they will follow pt during their stay. Pending repeat labs this afternoon to check on ROSE.     Physical Exam  Vitals reviewed.   Constitutional:       Appearance: Normal appearance.   HENT:      Head: Normocephalic and atraumatic.   Cardiovascular:      Rate and Rhythm: Normal rate and regular rhythm.      Pulses: Normal pulses.      Heart sounds: Normal heart sounds. No murmur heard.     No friction rub. No gallop.   Pulmonary:      Effort: Pulmonary effort is normal. No respiratory distress.      Breath sounds: Normal breath sounds. No stridor. No wheezing, rhonchi or rales.   Chest:      Chest wall: No tenderness.   Neurological:      Mental Status: He is alert.

## 2025-01-27 NOTE — ED NOTES
Pt ambulated to restroom. Urine sample obtained. Vitals assessed. Cardizem continues at 5ml/hr. IV fluids complete. Pt denies chest pain

## 2025-01-27 NOTE — CARE COORDINATION
Case Management Assessment Initial Evaluation    Date/Time of Evaluation: 2025 8:23 AM  Assessment Completed by: Perri Lucio RN    If patient is discharged prior to next notation, then this note serves as note for discharge by case management.    Patient Name: James Waters                   YOB: 1950  Diagnosis: Atrial fibrillation with RVR (HCC) [I48.91]  Atrial fibrillation, unspecified type (HCC) [I48.91]                   Date / Time: 2025 10:22 PM  Location: [facility]     Patient Admission Status: Inpatient   Readmission Risk Low 0-14, Mod 15-19), High > 20: Readmission Risk Score: 8.2    Current PCP: Diego Fernandez APRN - [facility]  Health Care Decision Makers:   Primary Decision Maker: Cristina Waters - Spouse - [phone]    Additional Case Management Notes: Presented to ED with c/o feeling like heart racing for 1 week. Found to be in afib RVR; started on cardizem drip. ROSE. Admitted to . Cardiology consulted. Echo ordered. Weaned off cardizem drip this morning.   Afebrile. NSR. On room air. Ox4. Discharging home today.     Procedures:    Echo with EF 60-65%    Imagin/27 CXR: No acute findings    Patient Goals/Plan/Treatment Preferences: Home w/wife. Denies needs, declines HH.      25 1554   Service Assessment   Patient Orientation Alert and Oriented   Cognition Alert   History Provided By Patient   Primary Caregiver Self   Accompanied By/Relationship n/a   Support Systems Spouse/Significant Other;Family Members;Children   Patient's Healthcare Decision Maker is: Named in Scanned ACP Document   PCP Verified by CM Yes   Prior Functional Level Independent in ADLs/IADLs   Current Functional Level Independent in ADLs/IADLs   Can patient return to prior living arrangement Yes   Ability to make needs known: Good   Family able to assist with home care needs: Yes   Would you like for me to discuss the discharge plan with any other family members/significant others,

## 2025-01-27 NOTE — DISCHARGE SUMMARY
DISCHARGE SUMMARY      Patient Identification:   James Waters   : 1950  MRN: [id number]   Account: [id number]      Patient's PCP: Diego Fernandez APRN - [facility]    Admit Date: 2025     Discharge Date:   25    Admitting Physician: Chadwick Rader MD     Discharge Physician: Martha Munroe DO     Discharge Diagnoses:    Paroxysmal atrial fibrillation with RVR:   Patient was cardioverted with Cardizem bolus/Cardizem drip to normal sinus rhythm  Continue anticoagulation  Hold atenolol till follow-up with PCP secondary to bradycardia     ROSE: 2/2 to dehydration   Corrected from 1.8 to 1.4  Continue PO hydration   repeat BMP and follow-up with PCP in 5 days      CAD:  Continue Plavix     Essential HTN:   Continue Home amlodipine at dc  Continue home losartan-HCTZ revaluate based on BMP  Hold atenolol till follow-up with PCP 2/2 bradycardia      HLD:   Continue fenofibrate     Memory changes:   Follows with Neurology as OP.      Elevated TSH: TSH 5.820, T4 1.37.   Continue levothyroxine  Follow up with PCP as OP     BPH with chronic prostatitis:   Continue Flomax       Active Hospital Problems    Diagnosis Date Noted    ROSE (acute kidney injury) (HCC) [N17.9] 2025    Atrial fibrillation with RVR (HCC) [I48.91] 10/09/2019       The patient was seen and examined on day of discharge and this discharge summary is in conjunction with any daily progress note from day of discharge.    Hospital Course:   James Waters is a 74 y.o. male admitted to [facility] on 2025 for James Waters is a 74 y.o. male with PMHx of PAF, HTN, CAD who presented to [facility] with chief complaint of tachycardia. Patient reports that he has been feeling like his heart is racing for about a week. States he lives with a nurse and she has been monitoring him at home. Patient is not a great historian, states his memory is not good. Denies any pain. Denies headache, dizziness, or lightheadedness.        Renal:    Lab Results   Component Value Date/Time     01/27/2025 12:37 PM    K 4.0 01/27/2025 12:37 PM     01/27/2025 12:37 PM    CO2 24 01/27/2025 12:37 PM    BUN 22 01/27/2025 12:37 PM    CREATININE 1.4 01/27/2025 12:37 PM    CALCIUM 9.2 01/27/2025 12:37 PM    PHOS 2.8 04/20/2023 02:48 PM         Significant Diagnostic Studies    Radiology:   XR CHEST PORTABLE   Final Result   No acute findings.      This document has been electronically signed by: Hugo Mccauley MD on    01/27/2025 12:03 AM             Consults:     IP CONSULT TO CARDIOLOGY    Disposition:    [x] Home       [] TCU       [] Rehab       [] Psych       [] SNF       [] Long Term Care Facility       [] Other-    Condition at Discharge: Stable    Code Status:  Full Code     Patient Instructions:    Discharge lab work: BMP 5 days  Activity: activity as tolerated  Diet: ADULT DIET; Regular  ORAL HYDRATION; Water; 300 ml (10 oz)  ORAL HYDRATION; Water; 600 ml (20 oz)      Follow-up visits:   Diego Fernandez, APRN - CNP  [address] [address]  [phone]    Schedule an appointment as soon as possible for a visit in 1 week(s)  For post hospital follow-up, Time: 10:30 am         Discharge Medications:        Medication List        CHANGE how you take these medications      tamsulosin 0.4 MG capsule  Commonly known as: Flomax  Take 1 capsule by mouth daily  What changed: when to take this            CONTINUE taking these medications      amLODIPine 5 MG tablet  Commonly known as: NORVASC  TAKE 1 TABLET BY MOUTH IN THE MORNING AND AT BEDTIME     apixaban 5 MG Tabs tablet  Commonly known as: Eliquis  TAKE 1 TABLET BY MOUTH EVERY 12 HOURS     clopidogrel 75 MG tablet  Commonly known as: PLAVIX  Take 1 tablet by mouth once daily     fenofibrate 160 MG tablet  Take 1 tablet by mouth once daily     folic acid 1 MG tablet  Commonly known as: FOLVITE  Take 1 tablet by mouth once daily     nitroGLYCERIN 0.4 MG SL tablet  Commonly  known as: NITROSTAT  up to max of 3 total doses. If no relief after 1 dose, call 911.     Synthroid 100 MCG tablet  Generic drug: levothyroxine  TAKE 1 TABLET BY MOUTH ONCE DAILY ON AN EMPTY STOMACH WITH  WATER  .  WAIT  30  MINUTES  BEFORE  EATING  OR  TAKING  OTHER  MEDS.     therapeutic multivitamin-minerals tablet     valsartan-hydroCHLOROthiazide 320-25 MG per tablet  Commonly known as: DIOVAN-HCT  Take 1 tablet by mouth once daily            STOP taking these medications      atenolol 25 MG tablet  Commonly known as: TENORMIN              Time Spent on discharge is more than 45 minutes in the examination, evaluation, counseling and review of medications and discharge plan.    Discharge Medications for PCI/MI (performed or attempted):   ASA:       Statin:     ACE/ARB:     P2Y12 Inhibitor:     Beta Blocker:   performed  Nitro SL:      Cardiac Rehab:    Dietary Consult:             Signed:    Thank you Diego Fernandez, APRN - CNP for the opportunity to be involved in this patient's care.    Electronically signed by Martha Munroe DO on 1/27/2025 at 4:16 PM

## 2025-01-27 NOTE — PLAN OF CARE
Problem: Chronic Conditions and Co-morbidities  Goal: Patient's chronic conditions and co-morbidity symptoms are monitored and maintained or improved  Outcome: Progressing  Flowsheets (Taken 1/27/2025 0419)  Care Plan - Patient's Chronic Conditions and Co-Morbidity Symptoms are Monitored and Maintained or Improved: Monitor and assess patient's chronic conditions and comorbid symptoms for stability, deterioration, or improvement     Problem: Discharge Planning  Goal: Discharge to home or other facility with appropriate resources  Outcome: Progressing  Flowsheets  Taken 1/27/2025 0425  Discharge to home or other facility with appropriate resources: Identify barriers to discharge with patient and caregiver  Taken 1/27/2025 0419  Discharge to home or other facility with appropriate resources: Identify barriers to discharge with patient and caregiver     Problem: Safety - Adult  Goal: Free from fall injury  Outcome: Progressing     Problem: ABCDS Injury Assessment  Goal: Absence of physical injury  Outcome: Progressing     Problem: Neurosensory - Adult  Goal: Achieves stable or improved neurological status  Outcome: Progressing  Flowsheets (Taken 1/27/2025 0511)  Achieves stable or improved neurological status: Assess for and report changes in neurological status     Problem: Cardiovascular - Adult  Goal: Maintains optimal cardiac output and hemodynamic stability  Outcome: Progressing  Flowsheets (Taken 1/27/2025 0511)  Maintains optimal cardiac output and hemodynamic stability: Monitor blood pressure and heart rate     Problem: Respiratory - Adult  Goal: Achieves optimal ventilation and oxygenation  Outcome: Progressing  Flowsheets (Taken 1/27/2025 0511)  Achieves optimal ventilation and oxygenation: Assess for changes in respiratory status     Problem: Gastrointestinal - Adult  Goal: Minimal or absence of nausea and vomiting  Outcome: Progressing  Flowsheets (Taken 1/27/2025 0511)  Minimal or absence of nausea and  vomiting: Administer IV fluids as ordered to ensure adequate hydration     Problem: Genitourinary - Adult  Goal: Absence of urinary retention  Outcome: Progressing  Flowsheets (Taken 1/27/2025 0511)  Absence of urinary retention: Assess patient’s ability to void and empty bladder     Problem: Metabolic/Fluid and Electrolytes - Adult  Goal: Electrolytes maintained within normal limits  Outcome: Progressing  Flowsheets (Taken 1/27/2025 0511)  Electrolytes maintained within normal limits: Monitor labs and assess patient for signs and symptoms of electrolyte imbalances     Problem: Skin/Tissue Integrity - Adult  Goal: Skin integrity remains intact  Outcome: Progressing  Flowsheets (Taken 1/27/2025 0511)  Skin Integrity Remains Intact: Monitor for areas of redness and/or skin breakdown     Problem: Hematologic - Adult  Goal: Maintains hematologic stability  Outcome: Progressing  Flowsheets (Taken 1/27/2025 0511)  Maintains hematologic stability: Assess for signs and symptoms of bleeding or hemorrhage     Problem: Musculoskeletal - Adult  Goal: Return mobility to safest level of function  Outcome: Progressing  Flowsheets (Taken 1/27/2025 0511)  Return Mobility to Safest Level of Function: Assess patient stability and activity tolerance for standing, transferring and ambulating with or without assistive devices       Care plan reviewed with patient and wife.  Patient and wife verbalize understanding of the plan of care and contribute to goal setting.

## 2025-01-27 NOTE — PROCEDURES
PROCEDURE NOTE  Date: 1/27/2025   Name: James Waters  YOB: 1950    Procedures EKG completed, given to RN

## 2025-01-27 NOTE — ED NOTES
Pt resting in bed. Vitals assessed. RR easy and unlabored. Pt denies chest pain at this time. Cardizem continues to infuse.

## 2025-01-27 NOTE — PROCEDURES
PROCEDURE NOTE  Date: 1/27/2025   Name: James Waters  YOB: 1950    Procedures        EKG was completed and handed to RN

## 2025-01-27 NOTE — FLOWSHEET NOTE
01/27/25 0419   [employer] info   Are you deaf or do you have serious difficulty hearing? No   Are you blind or do you have serious difficulty seeing, even when wearing glasses? No   Patient Goal of the Day(Whiteboard)   Patient Daily Goal reviewed with Patient;Spouse  (Spoke with wife via phone)   Patient's reason for admission A. Fib   Safe Environment   Arm Bands On ID   Patient has limb restriction? No   Safety Measures Standard Safety Measures   Precautions   Negative Pressure Room No   Positive Pressure Room No   Fall Risk Interventions   Nursing Judgement-Fall Risk High(Add Comments) No   Toilet Every 2 Hours-In Advance of Need Yes  (Offered)   Hourly Visual Checks Awake;In bed   Fall Visual Posted Fall sign posted   Room Door Open Yes   Alarm On Bed   Mobility   Activity In bed   Level of Assistance Minimal assist, patient does 75% or more   Distance Ambulated (ft) 20 ft   Ambulation Response Tolerated well   Turned/Repositioned Turns self   Range of Motion Active;All extremities   Head of Bed Elevated  Self regulated   Heels/Feet Heel(s) elevated off bed/floated;Foot of bed elevated   Anti-Embolism   Anti-Embolism Intervention Medication  (Plavix; Eliquis)   Cardiac Monitor   Cardiac/Telemetry Monitor On Bedside monitor in use         Pt admitted to   04  via cart/stretcher.   Complaints: A. Fib RVR - denies SOB / Chest pain.      IV normal saline infusing into forearm left, condition patent and no redness at rate 75 mls/ hour with about 800 mls in the bag still. IV site free of s/s of infection or infiltration.   Vital signs obtained. Assessment and data collection initiated.   Two nurse skin assessment performed by Mirtha NEAL RN and ADRIAN BROWN.  Oriented to room. Policies and procedures for  explained. All questions answered with no further questions at this time. Fall prevention and safety brochure discussed with patient.  Bed alarm on. Call light in reach.      Swallow Screening done at Bedside,  patient was able to complete and pass (See \"Head to Toe\" in \"Flowsheets\" for details)      Patient has no skin issues, is A+O x 4 however is a poor historian and unable to grasp topics regarding situation in hospital, and walks steadily with no assistive devices.

## 2025-01-27 NOTE — ED TRIAGE NOTES
Pt to ED from home. Pt c/c heart racing since 2110 tonight. Pt hx of afib- takes pavix and eliquis. Pt has not had an episode like this for about 2 years. Denies chest pain. EKG complete. Vitals assessed. RR easy and unlabored.

## 2025-01-27 NOTE — ED NOTES
ED to inpatient nurses report      Chief Complaint:  Chief Complaint   Patient presents with    Tachycardia     Present to ED from: Home    MOA:     LOC: alert and orientated to name, place, date  Mobility: Independent  Oxygen Baseline: Room Air    Current needs required: Room Air     Code Status:   Full Code    What abnormal results were found and what did you give/do to treat them? Atrial Fibrillation with RVR  Any procedures or intervention occur? Cardizem Gtt, Fluids, Labs, EKG    Mental Status:  Level of Consciousness: Alert (0)    Psych Assessment:        Vitals:  Patient Vitals for the past 24 hrs:   BP Temp Temp src Pulse Resp SpO2 Height Weight   01/27/25 0259 121/76 -- -- 99 16 99 % -- --   01/27/25 0151 (!) 146/95 -- -- 99 19 96 % -- --   01/27/25 0044 126/71 -- -- 92 20 95 % -- --   01/26/25 2336 110/71 -- -- (!) 121 16 96 % -- --   01/26/25 2230 104/76 98.9 °F (37.2 °C) Oral (!) 124 14 95 % 1.803 m (5' 11\") 79.4 kg (175 lb)        LDAs:   Peripheral IV 01/26/25 Left;Posterior Forearm (Active)       Ambulatory Status:  No data recorded    Diagnosis:  DISPOSITION Admitted 01/27/2025 01:13:49 AM   Final diagnoses:   Atrial fibrillation with RVR (HCC)        Consults:  IP CONSULT TO CARDIOLOGY     Pain Score:  Pain Assessment  Pain Assessment: None - Denies Pain    C-SSRS:   Risk of Suicide: No Risk    Sepsis Screening:       Faber Fall Risk:       Swallow Screening        Preferred Language:   English      ALLERGIES     Crestor [rosuvastatin] and Tape [adhesive tape]    SURGICAL HISTORY       Past Surgical History:   Procedure Laterality Date    CARDIAC CATHETERIZATION  08/20/2004    EF 60%    CARDIAC CATHETERIZATION  03/01/2003    EF 60%    CARDIOVASCULAR STRESS TEST  02/25/2010    EF 63%    CARDIOVASCULAR STRESS TEST  11/17/2006    EF 68%    CARDIOVASCULAR STRESS TEST  08/18/2005    EF 48%    CARDIOVASCULAR STRESS TEST  07/27/2004    EF 63%    CARDIOVASCULAR STRESS TEST  03/18/2003    EF 43%    CAROTID  STENT PLACEMENT      x3    DIAGNOSTIC CARDIAC CATH LAB PROCEDURE  08/18/2000    EF 70%    PTCA  07/19/2006    CYPHER STENT TO MID RCA    SKIN CANCER EXCISION  10/30/2023    TRANSTHORACIC ECHOCARDIOGRAM  02/25/2010    EF 55-65%       PAST MEDICAL HISTORY       Past Medical History:   Diagnosis Date    A-fib ([facility])     Acquired hypothyroidism 02/21/2019    Atrial fibrillation with RVR ([facility]) 10/09/2019    CAD (coronary artery disease)     Cancer ([facility])     SKIN    DDD (degenerative disc disease), cervical 04/10/2017    GERD (gastroesophageal reflux disease)     Gout     Hyperlipidemia     Hypertension     Obesity (BMI 30.0-34.9) 02/14/2019    PAD (peripheral artery disease) ([facility]) 12/19/2019           Electronically signed by Olya Rico RN on 1/27/2025 at 3:16 AM

## 2025-01-27 NOTE — CONSULTS
[facility]'s  Consult    Patient's Name/Date of Birth: James Waters / 1950 (74 y.o.)    Date: January 27, 2025     Referring Provider: Teja Elise DO    CHIEF COMPLAINT: Tachycardia    CONSULT FOR: A-Fib RVR    HPI: This is a pleasant 74 y.o. male PMHx PAF, HTN, CAD. Previous history A-Fib 5-6 years ago, family states patient was given atropine to convert, unclear if this is accurate per patient's recollection. Palpitation onset sudden while watching football, denies chest pain, chest pressure, diaphoresis, fever, chills, back pain, abdominal pain, N/B/D.  Patient was using Kardia device, indicated atrial fibrillation, arrived to ED and EKG confirmed patient was in A-fib RVR.  Previously on Eliquis 5 Mg twice daily from prior A-fib episode.  Patient received Cardizem bolus, drip, and converted to NSR early morning 1/27.  Holding good BP, O2 sats, heart rate hovering high 50s-low 60s.  No other complaints at this time.  He is not NPO.  No prior history cardioversion.     Echo: 4/17/2023.   Normal left ventricular size and systolic function.  There were no regional wall motion abnormalities.  Wall thickness was within normal limits.  Ejection fraction was estimated at 50-55%.     All labs, EKG's, diagnostic testing and images as well as cardiac cath, stress testing were reviewed during this encounter    Past Medical History:   Diagnosis Date    A-fib ([facility])     Acquired hypothyroidism 02/21/2019    Atrial fibrillation with RVR ([facility]) 10/09/2019    CAD (coronary artery disease)     Cancer ([facility])     SKIN    DDD (degenerative disc disease), cervical 04/10/2017    GERD (gastroesophageal reflux disease)     Gout     Hyperlipidemia     Hypertension     Obesity (BMI 30.0-34.9) 02/14/2019    PAD (peripheral artery disease) ([facility]) 12/19/2019     Past Surgical History:   Procedure Laterality Date    CARDIAC CATHETERIZATION  08/20/2004    EF 60%    CARDIAC CATHETERIZATION  03/01/2003    EF

## 2025-01-27 NOTE — PLAN OF CARE
Problem: Discharge Planning  Goal: Discharge to home or other facility with appropriate resources  1/27/2025 1606 by Shavonne Sylvester RN  Outcome: HH/HSPC Resolved Met  Flowsheets (Taken 1/27/2025 0856 by Andreea Enrique)  Discharge to home or other facility with appropriate resources: Identify barriers to discharge with patient and caregiver     Problem: Safety - Adult  Goal: Free from fall injury  1/27/2025 1606 by Shavonne Sylvester RN  Outcome: HH/HSPC Resolved Met     Problem: ABCDS Injury Assessment  Goal: Absence of physical injury  1/27/2025 1606 by Shavonne Sylvester RN  Outcome: HH/HSPC Resolved Met     Problem: Neurosensory - Adult  Goal: Achieves stable or improved neurological status  1/27/2025 1606 by Shavonne Sylvester RN  Outcome: HH/HSPC Resolved Met  Flowsheets (Taken 1/27/2025 0856 by Andreea Enrique)  Achieves stable or improved neurological status: Assess for and report changes in neurological status     Problem: Cardiovascular - Adult  Goal: Maintains optimal cardiac output and hemodynamic stability  1/27/2025 1606 by Shavonne Sylvester RN  Outcome: HH/HSPC Resolved Met  Flowsheets (Taken 1/27/2025 0856 by Andreea Enrique)  Maintains optimal cardiac output and hemodynamic stability: Monitor blood pressure and heart rate     Problem: Respiratory - Adult  Goal: Achieves optimal ventilation and oxygenation  1/27/2025 1606 by Shavonne Sylvester RN  Outcome: HH/HSPC Resolved Met     Problem: Gastrointestinal - Adult  Goal: Minimal or absence of nausea and vomiting  1/27/2025 1606 by Shavonne Sylvester RN  Outcome: HH/HSPC Resolved Met  Flowsheets (Taken 1/27/2025 0856 by Andreea Enrique)  Minimal or absence of nausea and vomiting: Administer IV fluids as ordered to ensure adequate hydration     Problem: Genitourinary - Adult  Goal: Absence of urinary retention  1/27/2025 1606 by Shavonne Sylvester RN  Outcome: HH/HSPC Resolved Met  Flowsheets (Taken 1/27/2025 0856 by Andreea Enrique)  Absence of urinary retention: Assess

## 2025-01-27 NOTE — ED NOTES
Pt ambulated to restroom. Pt back resting in bed, tolerated well. Vitals assessed. Cardizem continues at 5mL/hr. Continuous IV fluids started. RR easy and unlabored. Pt denies chest pain at this time.

## 2025-01-27 NOTE — H&P
Hospitalist History & Physical    Patient:  James Waters    Unit/Bed:20/020A  YOB: 1950  MRN: [id number]   Acct: [id number]   PCP: Diego Fernandez APRN - CNP  Code Status: Prior    Date of Service: Pt seen/examined on 01/27/25 and admitted to Inpatient with expected LOS greater than two midnights due to medical therapy.     Chief Complaint: tachycardia    Assessment/Plan:    Paroxysmal atrial fibrillation with RVR: EKG shows Afib with RVR (). [id number].   Continue Eliquis  Continue Cardizem gtt  Continue atenolol  EKG in AM  Echo in AM  Telemetry monitoring  Cardiology consult    ROSE: Cr. 1.8 (baseline ~1.3). Likely prerenal due to poor oral intake vs ?CKD.   UA and lytes pending  Gentle IVF overnight  Encourage oral hydration  BMP in AM  Avoid nephrotoxins  Consider Nephrology consult pending clinical course    CAD:  Continue Plavix    Essential HTN:   Hold amlodipine while on Cardizem gtt  Hold losartan-HCTZ in setting of ROSE  Continue atenolol    HLD:   Continue fenofibrate    Memory changes: Follows with Neurology as OP.     Elevated TSH: TSH 5.820, T4 1.37.   Continue levothyroxine  Follow up with PCP as OP    BPH with chronic prostatitis:   Continue Flomax      DVT prophylaxis: Already on anticoagulation  Diet: No diet orders on file  PT/OT: No  Tele: Yes  Code Status: Prior      History of Present Illness:  James Waters is a 74 y.o. male with PMHx of PAF, HTN, CAD who presented to [facility] with chief complaint of tachycardia. Patient reports that he has been feeling like his heart is racing for about a week. States he lives with a nurse and she has been monitoring him at home. Patient is not a great historian, states his memory is not good. Denies any pain. Denies headache, dizziness, or lightheadedness. Denies chest pain or SOB. Denies abdominal pain or nausea. Denies dysuria or hematuria.          Review of Systems: Pertinent positives as noted in the HPI. All other

## 2025-01-28 ENCOUNTER — TELEPHONE (OUTPATIENT)
Dept: FAMILY MEDICINE CLINIC | Age: 75
End: 2025-01-28

## 2025-01-28 NOTE — TELEPHONE ENCOUNTER
Care Transitions Initial Follow Up Call    Outreach made within 2 business days of discharge: Yes    Patient: James Waters Patient : 1950   MRN: [id number]  Reason for Admission:   Discharge Date: 25       Spoke with: Cristina chase patients wife    Discharge department/facility: [facility]    TCM Interactive Patient Contact:  Was patient able to fill all prescriptions: N/O  Was patient instructed to bring all medications to the follow-up visit: Yes  Is patient taking all medications as directed in the discharge summary? Yes  Does patient understand their discharge instructions: Yes  Does patient have questions or concerns that need addressed prior to 7-14 day follow up office visit: no    Additional needs identified to be addressed with provider  No needs identified             Scheduled appointment with PCP within 7-14 days    Follow Up  Future Appointments   Date Time Provider Department Center   2/3/2025 10:30 AM Diego Fernandez APRN - CNP [facility] DEP   2025 10:15 AM Toni Christina PA-C [facility]   2025  9:00 AM Ugo Munroe MD N [facility] L[facility]

## 2025-01-29 ENCOUNTER — HOSPITAL ENCOUNTER (EMERGENCY)
Age: 75
Discharge: HOME | End: 2025-01-29
Attending: EMERGENCY MEDICINE
Payer: MEDICARE

## 2025-01-29 ENCOUNTER — APPOINTMENT (OUTPATIENT)
Dept: GENERAL RADIOLOGY | Age: 75
End: 2025-01-29
Payer: MEDICARE

## 2025-01-29 VITALS
OXYGEN SATURATION: 97 % | DIASTOLIC BLOOD PRESSURE: 76 MMHG | HEART RATE: 61 BPM | RESPIRATION RATE: 18 BRPM | TEMPERATURE: 98 F | SYSTOLIC BLOOD PRESSURE: 117 MMHG

## 2025-01-29 DIAGNOSIS — I48.0 PAROXYSMAL ATRIAL FIBRILLATION (HCC): Primary | ICD-10-CM

## 2025-01-29 LAB
ANION GAP SERPL CALC-SCNC: 12 MEQ/L (ref 8–16)
BASOPHILS ABSOLUTE: 0.1 THOU/MM3 (ref 0–0.1)
BASOPHILS NFR BLD AUTO: 1.2 %
BUN SERPL-MCNC: 24 MG/DL (ref 7–22)
CALCIUM SERPL-MCNC: 9.8 MG/DL (ref 8.5–10.5)
CHLORIDE SERPL-SCNC: 102 MEQ/L (ref 98–111)
CO2 SERPL-SCNC: 25 MEQ/L (ref 23–33)
CREAT SERPL-MCNC: 1.3 MG/DL (ref 0.4–1.2)
DEPRECATED RDW RBC AUTO: 42 FL (ref 35–45)
EKG ATRIAL RATE: 91 BPM
EKG P AXIS: 65 DEGREES
EKG P-R INTERVAL: 200 MS
EKG Q-T INTERVAL: 298 MS
EKG Q-T INTERVAL: 358 MS
EKG QRS DURATION: 78 MS
EKG QRS DURATION: 80 MS
EKG QTC CALCULATION (BAZETT): 435 MS
EKG QTC CALCULATION (BAZETT): 441 MS
EKG R AXIS: 44 DEGREES
EKG R AXIS: 5 DEGREES
EKG T AXIS: 60 DEGREES
EKG T AXIS: 70 DEGREES
EKG VENTRICULAR RATE: 132 BPM
EKG VENTRICULAR RATE: 89 BPM
EOSINOPHIL NFR BLD AUTO: 1.4 %
EOSINOPHILS ABSOLUTE: 0.1 THOU/MM3 (ref 0–0.4)
ERYTHROCYTE [DISTWIDTH] IN BLOOD BY AUTOMATED COUNT: 12.9 % (ref 11.5–14.5)
GFR SERPL CREATININE-BSD FRML MDRD: 58 ML/MIN/1.73M2
GLUCOSE SERPL-MCNC: 113 MG/DL (ref 70–108)
HCT VFR BLD AUTO: 45 % (ref 42–52)
HGB BLD-MCNC: 15.3 GM/DL (ref 14–18)
IMM GRANULOCYTES # BLD AUTO: 0.02 THOU/MM3 (ref 0–0.07)
IMM GRANULOCYTES NFR BLD AUTO: 0.2 %
LYMPHOCYTES ABSOLUTE: 2.1 THOU/MM3 (ref 1–4.8)
LYMPHOCYTES NFR BLD AUTO: 22.4 %
MCH RBC QN AUTO: 30.2 PG (ref 26–33)
MCHC RBC AUTO-ENTMCNC: 34 GM/DL (ref 32.2–35.5)
MCV RBC AUTO: 88.9 FL (ref 80–94)
MONOCYTES ABSOLUTE: 0.7 THOU/MM3 (ref 0.4–1.3)
MONOCYTES NFR BLD AUTO: 7.4 %
NEUTROPHILS ABSOLUTE: 6.3 THOU/MM3 (ref 1.8–7.7)
NEUTROPHILS NFR BLD AUTO: 67.4 %
NRBC BLD AUTO-RTO: 0 /100 WBC
OSMOLALITY SERPL CALC.SUM OF ELEC: 282.4 MOSMOL/KG (ref 275–300)
PLATELET # BLD AUTO: 245 THOU/MM3 (ref 130–400)
PMV BLD AUTO: 10.8 FL (ref 9.4–12.4)
POTASSIUM SERPL-SCNC: 3.9 MEQ/L (ref 3.5–5.2)
RBC # BLD AUTO: 5.06 MILL/MM3 (ref 4.7–6.1)
SODIUM SERPL-SCNC: 139 MEQ/L (ref 135–145)
TROPONIN, HIGH SENSITIVITY: 14 NG/L (ref 0–12)
TSH SERPL DL<=0.005 MIU/L-ACNC: 2.96 UIU/ML (ref 0.4–4.2)
WBC # BLD AUTO: 9.3 THOU/MM3 (ref 4.8–10.8)

## 2025-01-29 PROCEDURE — 2500000003 HC RX 250 WO HCPCS: Performed by: EMERGENCY MEDICINE

## 2025-01-29 PROCEDURE — 80048 BASIC METABOLIC PNL TOTAL CA: CPT

## 2025-01-29 PROCEDURE — 96374 THER/PROPH/DIAG INJ IV PUSH: CPT

## 2025-01-29 PROCEDURE — 93005 ELECTROCARDIOGRAM TRACING: CPT | Performed by: EMERGENCY MEDICINE

## 2025-01-29 PROCEDURE — 92960 CARDIOVERSION ELECTRIC EXT: CPT

## 2025-01-29 PROCEDURE — 85025 COMPLETE CBC W/AUTO DIFF WBC: CPT

## 2025-01-29 PROCEDURE — 84484 ASSAY OF TROPONIN QUANT: CPT

## 2025-01-29 PROCEDURE — 71045 X-RAY EXAM CHEST 1 VIEW: CPT

## 2025-01-29 PROCEDURE — 36415 COLL VENOUS BLD VENIPUNCTURE: CPT

## 2025-01-29 PROCEDURE — 99152 MOD SED SAME PHYS/QHP 5/>YRS: CPT

## 2025-01-29 PROCEDURE — 84443 ASSAY THYROID STIM HORMONE: CPT

## 2025-01-29 PROCEDURE — 99285 EMERGENCY DEPT VISIT HI MDM: CPT

## 2025-01-29 RX ORDER — ETOMIDATE 2 MG/ML
7 INJECTION INTRAVENOUS ONCE
Status: COMPLETED | OUTPATIENT
Start: 2025-01-29 | End: 2025-01-29

## 2025-01-29 RX ORDER — METOPROLOL TARTRATE 1 MG/ML
5 INJECTION, SOLUTION INTRAVENOUS ONCE
Status: COMPLETED | OUTPATIENT
Start: 2025-01-29 | End: 2025-01-29

## 2025-01-29 RX ADMIN — ETOMIDATE INJECTION 7 MG: 2 SOLUTION INTRAVENOUS at 14:40

## 2025-01-29 RX ADMIN — METOPROLOL TARTRATE 5 MG: 5 INJECTION INTRAVENOUS at 14:46

## 2025-01-29 NOTE — ED PROVIDER NOTES
[facility] Emergency Department      CHIEF COMPLAINT       Chief Complaint   Patient presents with    Tachycardia       Nurses Notes reviewed and I agree except as noted in the HPI.      HISTORY OF PRESENT ILLNESS    James Waters is a 74 y.o. male who presents patient with a history of A-fib presenting with A-fib RVR, reporting palpitations.  No chest pain, no shortness of breath, on metoprolol and Eliquis.  States that he is compliant with his anticoagulation and rate control medications.  Onset: Acute  Duration: 3 hours prior to arrival  Timing: Persistent  Location of Pain: No pain  Intesity/severity: Heart rate in the 130s  Modifying Factors: History of A-fib  Relieved by;  Previous Episodes;  Tx Before arrival: None    PAST MEDICAL HISTORY    has a past medical history of A-fib (HCC), Acquired hypothyroidism, Atrial fibrillation with RVR ([facility]), CAD (coronary artery disease), Cancer ([facility]), DDD (degenerative disc disease), cervical, GERD (gastroesophageal reflux disease), Gout, Hyperlipidemia, Hypertension, Obesity (BMI 30.0-34.9), and PAD (peripheral artery disease) ([facility]).    SURGICAL HISTORY      has a past surgical history that includes transthoracic echocardiogram (02/25/2010); cardiovascular stress test (02/25/2010); cardiovascular stress test (11/17/2006); cardiovascular stress test (08/18/2005); cardiovascular stress test (07/27/2004); cardiovascular stress test (03/18/2003); Cardiac catheterization (08/20/2004); Percutaneous Transluminal Coronary Angio (07/19/2006); Cardiac catheterization (03/01/2003); Diagnostic Cardiac Cath Lab Procedure (08/18/2000); Carotid stent placement; and Skin cancer excision (10/30/2023).    CURRENT MEDICATIONS       Discharge Medication List as of 1/29/2025  3:52 PM        CONTINUE these medications which have NOT CHANGED    Details   folic acid (FOLVITE) 1 MG tablet Take 1 tablet by mouth once daily, Disp-90 tablet, R-3Normal      fenofibrate 160 MG tablet Take 1

## 2025-01-29 NOTE — SEDATION DOCUMENTATION
Pt resting in bed, tolerated procedure well  Medicated per mar   Pt has no complaints at this time

## 2025-01-29 NOTE — ED TRIAGE NOTES
Pt arrives to ED from home with c/o afib. Pt states [place] when his began to \"flip flop\". He states he sat down to take a rest, but states it did not get any better  Pt reports being seen in ED and admitted for a few days for the afib.   Pt denies cp, sob or other symptoms. States he knows he is in afib and has not converted yet  Pt is Aox4HR 138-156   IV established, blood work collected  EKG completed

## 2025-01-29 NOTE — SEDATION DOCUMENTATION
Pt resting up in bed, no complaints or concerns at this time  Patient resting in bed. Respirations easy and unlabored. No distress noted. Call light within reach.

## 2025-01-30 ENCOUNTER — TELEPHONE (OUTPATIENT)
Dept: CARDIOLOGY CLINIC | Age: 75
End: 2025-01-30

## 2025-01-30 DIAGNOSIS — I48.0 PAROXYSMAL ATRIAL FIBRILLATION (HCC): Primary | ICD-10-CM

## 2025-01-30 NOTE — TELEPHONE ENCOUNTER
Pt wife sheilaovaylin states pt went to ER 1/26/25 for afib, they did the cardizem bolus drip till normal sinus, sent home, back in on 1/29/25 for afib, they cardioverted him and added back on tenormin 12.5 daily, but the er wanted them to contact you for more instructions. She states pt is doing well at this time, but asking your recommendations

## 2025-01-31 ENCOUNTER — HOSPITAL ENCOUNTER (OUTPATIENT)
Age: 75
Discharge: HOME | End: 2025-01-31
Payer: MEDICARE

## 2025-01-31 DIAGNOSIS — N17.9 AKI (ACUTE KIDNEY INJURY) (HCC): ICD-10-CM

## 2025-01-31 LAB
ANION GAP SERPL CALC-SCNC: 16 MEQ/L (ref 8–16)
BUN SERPL-MCNC: 31 MG/DL (ref 7–22)
CALCIUM SERPL-MCNC: 9.9 MG/DL (ref 8.5–10.5)
CHLORIDE SERPL-SCNC: 101 MEQ/L (ref 98–111)
CO2 SERPL-SCNC: 24 MEQ/L (ref 23–33)
CREAT SERPL-MCNC: 1.6 MG/DL (ref 0.4–1.2)
GFR SERPL CREATININE-BSD FRML MDRD: 45 ML/MIN/1.73M2
GLUCOSE SERPL-MCNC: 123 MG/DL (ref 70–108)
POTASSIUM SERPL-SCNC: 4.2 MEQ/L (ref 3.5–5.2)
SODIUM SERPL-SCNC: 141 MEQ/L (ref 135–145)

## 2025-01-31 PROCEDURE — 36415 COLL VENOUS BLD VENIPUNCTURE: CPT

## 2025-01-31 PROCEDURE — 80048 BASIC METABOLIC PNL TOTAL CA: CPT

## 2025-01-31 RX ORDER — ATENOLOL 25 MG/1
12.5 TABLET ORAL DAILY
COMMUNITY

## 2025-01-31 NOTE — TELEPHONE ENCOUNTER
Schedule 30 monitor for 2/4/25, arrival time 9:30 am     Call to pt's wife, date, time and instructions reviewed with pt's wife.

## 2025-02-03 ENCOUNTER — OFFICE VISIT (OUTPATIENT)
Dept: FAMILY MEDICINE CLINIC | Age: 75
End: 2025-02-03

## 2025-02-03 VITALS
BODY MASS INDEX: 26.47 KG/M2 | HEART RATE: 64 BPM | WEIGHT: 189.8 LBS | RESPIRATION RATE: 16 BRPM | SYSTOLIC BLOOD PRESSURE: 134 MMHG | DIASTOLIC BLOOD PRESSURE: 50 MMHG

## 2025-02-03 DIAGNOSIS — R90.82 WHITE MATTER ABNORMALITY ON MRI OF BRAIN: ICD-10-CM

## 2025-02-03 DIAGNOSIS — E11.9 TYPE 2 DIABETES MELLITUS WITHOUT COMPLICATION, WITHOUT LONG-TERM CURRENT USE OF INSULIN (HCC): ICD-10-CM

## 2025-02-03 DIAGNOSIS — I48.0 PAF (PAROXYSMAL ATRIAL FIBRILLATION) (HCC): ICD-10-CM

## 2025-02-03 DIAGNOSIS — J01.00 ACUTE NON-RECURRENT MAXILLARY SINUSITIS: ICD-10-CM

## 2025-02-03 DIAGNOSIS — E78.2 MIXED HYPERLIPIDEMIA: ICD-10-CM

## 2025-02-03 DIAGNOSIS — Z09 HOSPITAL DISCHARGE FOLLOW-UP: Primary | ICD-10-CM

## 2025-02-03 DIAGNOSIS — Z95.5 STENTED CORONARY ARTERY: ICD-10-CM

## 2025-02-03 DIAGNOSIS — E03.9 ACQUIRED HYPOTHYROIDISM: ICD-10-CM

## 2025-02-03 DIAGNOSIS — Z86.73 HISTORY OF CVA (CEREBROVASCULAR ACCIDENT): ICD-10-CM

## 2025-02-03 DIAGNOSIS — I25.110 CORONARY ARTERY DISEASE INVOLVING NATIVE CORONARY ARTERY OF NATIVE HEART WITH UNSTABLE ANGINA PECTORIS (HCC): ICD-10-CM

## 2025-02-03 DIAGNOSIS — R41.0 CONFUSION AND DISORIENTATION: ICD-10-CM

## 2025-02-03 RX ORDER — DOXYCYCLINE HYCLATE 100 MG
100 TABLET ORAL 2 TIMES DAILY
Qty: 14 TABLET | Refills: 0 | Status: SHIPPED | OUTPATIENT
Start: 2025-02-03 | End: 2025-02-10

## 2025-02-03 ASSESSMENT — PATIENT HEALTH QUESTIONNAIRE - PHQ9
1. LITTLE INTEREST OR PLEASURE IN DOING THINGS: NOT AT ALL
SUM OF ALL RESPONSES TO PHQ9 QUESTIONS 1 & 2: 0
SUM OF ALL RESPONSES TO PHQ QUESTIONS 1-9: 0
2. FEELING DOWN, DEPRESSED OR HOPELESS: NOT AT ALL

## 2025-02-03 ASSESSMENT — ENCOUNTER SYMPTOMS
NAUSEA: 0
COUGH: 0
ABDOMINAL PAIN: 0
SHORTNESS OF BREATH: 0
RHINORRHEA: 1

## 2025-02-03 NOTE — PATIENT INSTRUCTIONS
You may receive a survey regarding the care you received during your visit.  Your input is valuable to us.  We encourage you to complete and return your survey.  We hope you will choose us in the future for your healthcare needs.

## 2025-02-03 NOTE — PROGRESS NOTES
SYNTHROID 100 MCG tablet TAKE 1 TABLET BY MOUTH ONCE DAILY ON AN EMPTY STOMACH WITH  WATER  .  WAIT  30  MINUTES  BEFORE  EATING  OR  TAKING  OTHER  MEDS. 90 tablet 3    amLODIPine (NORVASC) 5 MG tablet TAKE 1 TABLET BY MOUTH IN THE MORNING AND AT BEDTIME 180 tablet 3    valsartan-hydroCHLOROthiazide (DIOVAN-HCT) 320-25 MG per tablet Take 1 tablet by mouth once daily 90 tablet 3    tamsulosin (FLOMAX) 0.4 MG capsule Take 1 capsule by mouth daily (Patient taking differently: Take 1 capsule by mouth nightly) 90 capsule 3    nitroGLYCERIN (NITROSTAT) 0.4 MG SL tablet up to max of 3 total doses. If no relief after 1 dose, call 911. 25 tablet 3    Multiple Vitamins-Minerals (THERAPEUTIC MULTIVITAMIN-MINERALS) tablet Take 1 tablet by mouth daily          Medications patient taking as of now reconciled against medications ordered at time of hospital discharge: Yes    Review of Systems   Constitutional:  Positive for fatigue. Negative for chills and fever.   HENT:  Positive for congestion, postnasal drip and rhinorrhea.    Respiratory:  Negative for cough and shortness of breath.    Cardiovascular:  Negative for chest pain.   Gastrointestinal:  Negative for abdominal pain and nausea.   Skin:  Negative for rash.   Neurological:  Negative for dizziness, light-headedness and headaches.   Psychiatric/Behavioral:  Positive for confusion.        Objective:    BP (!) 134/50 (Site: Left Upper Arm, Position: Sitting, Cuff Size: Large Adult)   Pulse 64   Resp 16   Wt 86.1 kg (189 lb 12.8 oz)   BMI 26.47 kg/m²   Physical Exam  Constitutional:       General: He is not in acute distress.  HENT:      Nose: Mucosal edema, congestion and rhinorrhea present.      Right Sinus: No maxillary sinus tenderness.      Left Sinus: No maxillary sinus tenderness.   Eyes:      Pupils: Pupils are equal, round, and reactive to light.   Cardiovascular:      Rate and Rhythm: Normal rate and regular rhythm. No extrasystoles are present.     Heart

## 2025-02-04 ENCOUNTER — HOSPITAL ENCOUNTER (OUTPATIENT)
Age: 75
Discharge: HOME | End: 2025-02-06
Attending: NUCLEAR MEDICINE
Payer: MEDICARE

## 2025-02-04 DIAGNOSIS — I48.0 PAROXYSMAL ATRIAL FIBRILLATION (HCC): ICD-10-CM

## 2025-02-04 PROCEDURE — 93270 REMOTE 30 DAY ECG REV/REPORT: CPT

## 2025-02-10 NOTE — PROGRESS NOTES
Physician Progress Note      PATIENT:               ZULEIMA CARROLL  [facility] #:                  [id number]  :                       1950  ADMIT DATE:       2025 10:22 PM  DISCH DATE:        2025 5:00 PM  RESPONDING  PROVIDER #:        Martha Munroe DO          QUERY TEXT:    Patient admitted with Paroxysmal atrial fibrillation and is maintained on   Eliquis. If possible, please document in progress notes and discharge summary   if you are evaluating and/or treating any of the following:?  ?  The medical record reflects the following:  Risk Factors: Age 74, HTN  Clinical Indicators: DS Note - Paroxysmal atrial fibrillation with RVR  Treatment: Eliquis, cardioverted with Cardizem bolus/Cardizem drip to normal   sinus rhythm    Thank you,  Joann Oden, [facility], [facility].  Options provided:  -- Secondary hypercoagulable state in a patient with atrial fibrillation  -- Other - I will add my own diagnosis  -- Disagree - Not applicable / Not valid  -- Disagree - Clinically unable to determine / Unknown  -- Refer to Clinical Documentation Reviewer    PROVIDER RESPONSE TEXT:    This patient has secondary hypercoagulable state in a patient with atrial   fibrillation.    Query created by: Joann Oden on 2/10/2025 12:30 AM      QUERY TEXT:    Patient admitted with Paroxysmal atrial fibrillation.  Noted documentation of   Acute Kidney Injury in DS on .  In order to support the diagnosis of ROSE,   please include additional clinical indicators in your documentation.? Or   please document if the diagnosis of ROSE has been ruled out after further   study.  The medical record reflects the following:  Risk Factors: Dehydration, HTN, HLD  Clinical Indicators: DS Note - ROSE: 2/2 to dehydration. Corrected from 1.8   to 1.4. Continue PO hydration. repeat BMP and follow-up with PCP in 5 days  Labs Creatinine on - 1.8, on -1.4, 1.5, on - 1.3, on - 1.6  BUN on - , on - , on

## 2025-02-25 ENCOUNTER — TELEPHONE (OUTPATIENT)
Dept: FAMILY MEDICINE CLINIC | Age: 75
End: 2025-02-25

## 2025-02-25 DIAGNOSIS — E78.2 MIXED HYPERLIPIDEMIA: ICD-10-CM

## 2025-02-25 DIAGNOSIS — E11.9 TYPE 2 DIABETES MELLITUS WITHOUT COMPLICATION, WITHOUT LONG-TERM CURRENT USE OF INSULIN (HCC): Primary | ICD-10-CM

## 2025-02-25 NOTE — TELEPHONE ENCOUNTER
Wife Cristina on HIPAA notified of above and will have patient due labs in next week  at [facility] or [facility]

## 2025-02-25 NOTE — TELEPHONE ENCOUNTER
The patient is due for a A1C.  Last office visit was 2/3/2025 and his last A1C was 12/16/2023.  Please advise.

## 2025-03-07 ENCOUNTER — TELEPHONE (OUTPATIENT)
Dept: CARDIOLOGY CLINIC | Age: 75
End: 2025-03-07

## 2025-03-07 DIAGNOSIS — I45.5 SINUS PAUSE: Primary | ICD-10-CM

## 2025-03-07 NOTE — TELEPHONE ENCOUNTER
Call from Dr altamirano.-please agree  Pauses on monitor.  Referral to Dr Reyna if patient agreeable to discuss possible pacemaker.    Spoke to patient and his wife.  Agreeable to see Axel.    Pt was to see Silas on 3/11-will cancel that appointment and get him scheduled with Dr Reyna. Will discuss with Catrina Rodriguez on Monday to see when he can be scheduled.

## 2025-03-10 RX ORDER — NITROGLYCERIN 0.4 MG/1
TABLET SUBLINGUAL
Qty: 25 TABLET | Refills: 3 | Status: SHIPPED | OUTPATIENT
Start: 2025-03-10

## 2025-03-10 NOTE — TELEPHONE ENCOUNTER
James Waters called requesting a refill on the following medications:  Requested Prescriptions     Pending Prescriptions Disp Refills    nitroGLYCERIN (NITROSTAT) 0.4 MG SL tablet 25 tablet 3     Sig: up to max of 3 total doses. If no relief after 1 dose, call 911.     Pharmacy verified:[facility] ph [phone]  .pv      Date of last visit: 04.30.2024  Date of next visit (if applicable): 05.07.2025

## 2025-03-11 ENCOUNTER — OFFICE VISIT (OUTPATIENT)
Dept: CARDIOLOGY CLINIC | Age: 75
End: 2025-03-11
Payer: MEDICARE

## 2025-03-11 ENCOUNTER — TELEPHONE (OUTPATIENT)
Dept: CARDIOLOGY CLINIC | Age: 75
End: 2025-03-11

## 2025-03-11 VITALS
BODY MASS INDEX: 26.46 KG/M2 | HEART RATE: 59 BPM | SYSTOLIC BLOOD PRESSURE: 134 MMHG | WEIGHT: 189 LBS | DIASTOLIC BLOOD PRESSURE: 67 MMHG | HEIGHT: 71 IN | OXYGEN SATURATION: 97 %

## 2025-03-11 DIAGNOSIS — I48.0 PAROXYSMAL ATRIAL FIBRILLATION (HCC): ICD-10-CM

## 2025-03-11 DIAGNOSIS — R00.1 BRADYCARDIA: ICD-10-CM

## 2025-03-11 DIAGNOSIS — I45.5 SINUS PAUSE: Primary | ICD-10-CM

## 2025-03-11 DIAGNOSIS — I10 PRIMARY HYPERTENSION: ICD-10-CM

## 2025-03-11 DIAGNOSIS — I25.10 CORONARY ARTERY DISEASE INVOLVING NATIVE CORONARY ARTERY OF NATIVE HEART WITHOUT ANGINA PECTORIS: ICD-10-CM

## 2025-03-11 PROBLEM — I48.91 AFIB (HCC): Status: ACTIVE | Noted: 2025-03-11

## 2025-03-11 PROCEDURE — 93000 ELECTROCARDIOGRAM COMPLETE: CPT | Performed by: INTERNAL MEDICINE

## 2025-03-11 PROCEDURE — 1036F TOBACCO NON-USER: CPT | Performed by: INTERNAL MEDICINE

## 2025-03-11 PROCEDURE — 99205 OFFICE O/P NEW HI 60 MIN: CPT | Performed by: INTERNAL MEDICINE

## 2025-03-11 PROCEDURE — 1123F ACP DISCUSS/DSCN MKR DOCD: CPT | Performed by: INTERNAL MEDICINE

## 2025-03-11 PROCEDURE — 3017F COLORECTAL CA SCREEN DOC REV: CPT | Performed by: INTERNAL MEDICINE

## 2025-03-11 PROCEDURE — G8427 DOCREV CUR MEDS BY ELIG CLIN: HCPCS | Performed by: INTERNAL MEDICINE

## 2025-03-11 PROCEDURE — G8417 CALC BMI ABV UP PARAM F/U: HCPCS | Performed by: INTERNAL MEDICINE

## 2025-03-11 PROCEDURE — 1159F MED LIST DOCD IN RCRD: CPT | Performed by: INTERNAL MEDICINE

## 2025-03-11 PROCEDURE — 3075F SYST BP GE 130 - 139MM HG: CPT | Performed by: INTERNAL MEDICINE

## 2025-03-11 PROCEDURE — 3078F DIAST BP <80 MM HG: CPT | Performed by: INTERNAL MEDICINE

## 2025-03-11 RX ORDER — MEMANTINE HYDROCHLORIDE 5 MG/1
5 TABLET ORAL 2 TIMES DAILY
COMMUNITY

## 2025-03-11 NOTE — PROGRESS NOTES
[facility] [place] [facility]  [facility]  [address]  [address]  [place] [address]  Dept: [phone]  Loc: [phone]   Cardiac Electrophysiology  Patient's demographics:  Date:                               3/11/2025  Patient name:               James Waters   YOB: 1950   Sex:                                 male   MRN:                               [id number]     Primary Care Physician:  Diego Fernandez, APRN - CNP      Cardiologist:      Reason for Consultation:  Chief Complaint   Patient presents with    New Patient     Ref Baki-  pauses on monitor/ Afib          Assessment And Recommendations:  PAF with bradycardia and sinus pause. He was admitted recently for rapid AF, firs ttime in 4-5 years. Self converted. Ambulatory monitor showed 4 second pause during day time hours. He was at [facility]. He does report feeling a bit dizzy early on but denies any current sx. We discussed management of sinus pause with consideration for pacemaker. Reviewed procedure in detail including risks benefits alternatives.     We also discussed management of his AF. Given isolated episode in last few years, will cont to monitor via ppm. If he has increasing aF burden, will consider AAD therapy vs catheter ablation. Reviewed all this in great detail with him.     2. CAD, multiple PCI in the past, last in 2013. On DAPT and statin.  3. HTN     Clinical Summary:  73 yo with extensive CAD hx hx with numerous PCI, HTN, and PAF.He was admitted recently for rapid AF, firs ttime in 4-5 years. Self converted. Ambulatory monitor showed several sinus pauses, longest 4 second pause during day time hours. He was at [facility]. He does report feeling a bit dizzy early on but denies any current sx.  No AF seen on monitor.     Review of systems:  Constitutional: Negative for chills and fever  HENT: Negative for congestion, sinus pressure, sneezing and sore throat.    Eyes:

## 2025-03-11 NOTE — PATIENT INSTRUCTIONS
If your physician has ordered procedures/ testing and you have not been contacted with in 2 days after your office visit,  please call our office.     If you have had your testing performed -  please allow 48 hours for our office to notify you of the results.  If you have not heard from us with in the 48 hrs,  please call the office.     Results for the 14 day and 30 day heart monitor - please allow 7-10 business days after the monitor is mailed back.    You may receive a survey regarding the care you received during your visit.  Your input is valuable to us.  We encourage you to complete and return your survey.  We hope you will choose us in the future for your healthcare needs.  Thank you for choosing [facility]!    Your Medical Assistant Today:  Delores RINALDI   Your Provider for Today: Dr. Reyna  Ph. [phone] opt 1

## 2025-03-11 NOTE — TELEPHONE ENCOUNTER
Procedure: Pacemaker   Date: 03.24.2025  Arrival Time: 10am  Meds to Hold: Eliquis 1 day  ,  plavix 5 days  prior to the procedure  Please see meds-  do you agree?      Instructions verbalized to the patient.  Instructions given to the patient.           [facility]   [phone] Opt 1  Patient Instructions- Pacemaker/ ICD/ EP Procedures    Patient: James Waters  Procedure: Pacemaker implant   Date of Procedure: 03.24.2025  Arrival Time: 10am    Follow up Appointments:  Incision/  device check 04.03.2025 at 10am    Diet:   Nothing to eat or drink after midnight the night before your procedure is scheduled.  You may take your AM medications with a small sip of water the morning of the procedure (unless directed otherwise)  Medications:    Medications to Hold: Eliquis 1 day prior,  Plavix 5 days prior.  Continue on your regular medications unless otherwise instructed by the office.  Please notify us of ANY change in Allergies or Medications.  If you are a diabetic, do NOT take your diabetes medications the day of the procedure.   If you are using a CPAP, please bring your machine with you to the hospital.   Admission:   Please report to the second floor - [facility].   Please BRING YOUR ACTUAL BOTTLE OF MEDICATIONS with you to the hospital.   Bring your Photo ID & Insurance Cards.   Bring an overnight bag with pajamas, robe, toiletry items in case you spend the night, as most procedures do require a 23 hour stay.   Our staff will take care of any authorizations/ Pre- auth needs for your procedure if required.

## 2025-03-13 ENCOUNTER — OFFICE VISIT (OUTPATIENT)
Dept: UROLOGY | Age: 75
End: 2025-03-13
Payer: MEDICARE

## 2025-03-13 VITALS — HEIGHT: 71 IN | BODY MASS INDEX: 26.46 KG/M2 | RESPIRATION RATE: 20 BRPM | WEIGHT: 189 LBS

## 2025-03-13 DIAGNOSIS — R39.12 BENIGN PROSTATIC HYPERPLASIA WITH WEAK URINARY STREAM: ICD-10-CM

## 2025-03-13 DIAGNOSIS — R30.0 DYSURIA: ICD-10-CM

## 2025-03-13 DIAGNOSIS — N41.1 CHRONIC PROSTATITIS: Primary | ICD-10-CM

## 2025-03-13 DIAGNOSIS — N40.1 BENIGN PROSTATIC HYPERPLASIA WITH WEAK URINARY STREAM: ICD-10-CM

## 2025-03-13 LAB
BILIRUBIN, URINE: NEGATIVE
BLOOD URINE, POC: NEGATIVE
CHARACTER, URINE: CLEAR
COLOR, UA: YELLOW
GLUCOSE URINE: NEGATIVE MG/DL
KETONES, URINE: NEGATIVE
LEUKOCYTE CLUMPS, URINE: NEGATIVE
NITRITE, URINE: NEGATIVE
PH, URINE: 6.5 (ref 5–9)
POST VOID RESIDUAL (PVR): 29 ML
PROTEIN, URINE: NEGATIVE MG/DL
SPECIFIC GRAVITY UA: 1.02 (ref 1–1.03)
UROBILINOGEN, URINE: 2 EU/DL (ref 0–1)

## 2025-03-13 PROCEDURE — 1123F ACP DISCUSS/DSCN MKR DOCD: CPT

## 2025-03-13 PROCEDURE — 3017F COLORECTAL CA SCREEN DOC REV: CPT

## 2025-03-13 PROCEDURE — G8427 DOCREV CUR MEDS BY ELIG CLIN: HCPCS

## 2025-03-13 PROCEDURE — 81003 URINALYSIS AUTO W/O SCOPE: CPT

## 2025-03-13 PROCEDURE — 99213 OFFICE O/P EST LOW 20 MIN: CPT

## 2025-03-13 PROCEDURE — 51798 US URINE CAPACITY MEASURE: CPT

## 2025-03-13 PROCEDURE — 1159F MED LIST DOCD IN RCRD: CPT

## 2025-03-13 PROCEDURE — 1036F TOBACCO NON-USER: CPT

## 2025-03-13 PROCEDURE — G8417 CALC BMI ABV UP PARAM F/U: HCPCS

## 2025-03-13 RX ORDER — TAMSULOSIN HYDROCHLORIDE 0.4 MG/1
0.4 CAPSULE ORAL DAILY
Qty: 90 CAPSULE | Refills: 4 | Status: SHIPPED | OUTPATIENT
Start: 2025-03-13

## 2025-03-13 NOTE — PROGRESS NOTES
negative  Hematological/Lymphatic: negative  Psychological: negative    Physical Exam:    There were no vitals filed for this visit.  Patient is a 74 y.o. male in no acute distress and alert and oriented to person, place and time.    Pulmonary: Non-labored respiration.  Cardiovascular: Normal rate, regular rhythm, normal peripheral pulses.  Skin: Warm and dry.  Psych: Normal mood and affect.  Genitourinary: Bladder non-distended and non-tender.      Assessment and Plan   BPH w/ LUTS  - Managed on Flomax 0.4 mg QD. Stable LUTS vs last visit. At goal.  - Continue Flomax. Refills sent.  - Check PSA prior to next visit, none on file since 2019.    2.    Chronic Prostatitis  Bladder wall thickening and BPH with prostate volume per CT in 2023 of around 65 g. Perineal discomfort at that time. Treated with Doxycycline 100 mg BID for 30 days and CP is now resolved. No recurrence of prostatitis per patient account.    *Follow-up in 1 year with PSA prior.    Toni Christina PA-C  Urology

## 2025-03-21 ENCOUNTER — PREP FOR PROCEDURE (OUTPATIENT)
Dept: CARDIOLOGY | Age: 75
End: 2025-03-21

## 2025-03-21 RX ORDER — SODIUM CHLORIDE 9 MG/ML
INJECTION, SOLUTION INTRAVENOUS PRN
Status: CANCELLED | OUTPATIENT
Start: 2025-03-21

## 2025-03-21 RX ORDER — SODIUM CHLORIDE 0.9 % (FLUSH) 0.9 %
5-40 SYRINGE (ML) INJECTION EVERY 12 HOURS SCHEDULED
Status: CANCELLED | OUTPATIENT
Start: 2025-03-21

## 2025-03-21 RX ORDER — SODIUM CHLORIDE 9 MG/ML
INJECTION, SOLUTION INTRAVENOUS CONTINUOUS
Status: CANCELLED | OUTPATIENT
Start: 2025-03-21

## 2025-03-21 RX ORDER — SODIUM CHLORIDE 0.9 % (FLUSH) 0.9 %
5-40 SYRINGE (ML) INJECTION PRN
Status: CANCELLED | OUTPATIENT
Start: 2025-03-21

## 2025-03-24 ENCOUNTER — ANESTHESIA EVENT (OUTPATIENT)
Age: 75
End: 2025-03-24
Payer: MEDICARE

## 2025-03-24 ENCOUNTER — APPOINTMENT (OUTPATIENT)
Dept: GENERAL RADIOLOGY | Age: 75
End: 2025-03-24
Attending: INTERNAL MEDICINE
Payer: MEDICARE

## 2025-03-24 ENCOUNTER — ANESTHESIA (OUTPATIENT)
Age: 75
End: 2025-03-24
Payer: MEDICARE

## 2025-03-24 ENCOUNTER — HOSPITAL ENCOUNTER (OUTPATIENT)
Age: 75
Setting detail: OUTPATIENT SURGERY
Discharge: HOME | End: 2025-03-24
Attending: INTERNAL MEDICINE | Admitting: INTERNAL MEDICINE
Payer: MEDICARE

## 2025-03-24 VITALS
HEART RATE: 67 BPM | OXYGEN SATURATION: 98 % | DIASTOLIC BLOOD PRESSURE: 73 MMHG | SYSTOLIC BLOOD PRESSURE: 133 MMHG | BODY MASS INDEX: 25 KG/M2 | WEIGHT: 178.57 LBS | TEMPERATURE: 97.5 F | RESPIRATION RATE: 26 BRPM | HEIGHT: 71 IN

## 2025-03-24 DIAGNOSIS — I48.91 AFIB (HCC): ICD-10-CM

## 2025-03-24 DIAGNOSIS — R00.1 BRADYCARDIA: ICD-10-CM

## 2025-03-24 DIAGNOSIS — I48.0 PAF (PAROXYSMAL ATRIAL FIBRILLATION) (HCC): ICD-10-CM

## 2025-03-24 DIAGNOSIS — I73.9 PAD (PERIPHERAL ARTERY DISEASE): ICD-10-CM

## 2025-03-24 DIAGNOSIS — I45.5 SINUS PAUSE: ICD-10-CM

## 2025-03-24 LAB
ANION GAP SERPL CALC-SCNC: 11 MEQ/L (ref 8–16)
APTT PPP: 31.2 SECONDS (ref 22–38)
BUN SERPL-MCNC: 24 MG/DL (ref 8–23)
CALCIUM SERPL-MCNC: 10 MG/DL (ref 8.8–10.2)
CHLORIDE SERPL-SCNC: 103 MEQ/L (ref 98–111)
CO2 SERPL-SCNC: 26 MEQ/L (ref 22–29)
CREAT SERPL-MCNC: 1.5 MG/DL (ref 0.7–1.2)
DEPRECATED RDW RBC AUTO: 42.8 FL (ref 35–45)
ECHO BSA: 2.01 M2
EKG ATRIAL RATE: 53 BPM
EKG P AXIS: 58 DEGREES
EKG P-R INTERVAL: 216 MS
EKG Q-T INTERVAL: 428 MS
EKG QRS DURATION: 90 MS
EKG QTC CALCULATION (BAZETT): 401 MS
EKG R AXIS: -9 DEGREES
EKG T AXIS: 34 DEGREES
EKG VENTRICULAR RATE: 53 BPM
ERYTHROCYTE [DISTWIDTH] IN BLOOD BY AUTOMATED COUNT: 13.1 % (ref 11.5–14.5)
GFR SERPL CREATININE-BSD FRML MDRD: 49 ML/MIN/1.73M2
GLUCOSE SERPL-MCNC: 106 MG/DL (ref 74–109)
HCT VFR BLD AUTO: 44.6 % (ref 42–52)
HGB BLD-MCNC: 14.9 GM/DL (ref 14–18)
INR PPP: 1.03 (ref 0.85–1.13)
MCH RBC QN AUTO: 30.2 PG (ref 26–33)
MCHC RBC AUTO-ENTMCNC: 33.4 GM/DL (ref 32.2–35.5)
MCV RBC AUTO: 90.3 FL (ref 80–94)
PLATELET # BLD AUTO: 239 THOU/MM3 (ref 130–400)
PMV BLD AUTO: 11.3 FL (ref 9.4–12.4)
POTASSIUM SERPL-SCNC: 4.6 MEQ/L (ref 3.5–5.2)
RBC # BLD AUTO: 4.94 MILL/MM3 (ref 4.7–6.1)
SODIUM SERPL-SCNC: 140 MEQ/L (ref 135–145)
WBC # BLD AUTO: 7.3 THOU/MM3 (ref 4.8–10.8)

## 2025-03-24 PROCEDURE — 36415 COLL VENOUS BLD VENIPUNCTURE: CPT

## 2025-03-24 PROCEDURE — 33208 INSRT HEART PM ATRIAL & VENT: CPT | Performed by: INTERNAL MEDICINE

## 2025-03-24 PROCEDURE — C1898 LEAD, PMKR, OTHER THAN TRANS: HCPCS | Performed by: INTERNAL MEDICINE

## 2025-03-24 PROCEDURE — 6360000002 HC RX W HCPCS: Performed by: INTERNAL MEDICINE

## 2025-03-24 PROCEDURE — 2580000003 HC RX 258: Performed by: NURSE ANESTHETIST, CERTIFIED REGISTERED

## 2025-03-24 PROCEDURE — 2500000003 HC RX 250 WO HCPCS: Performed by: INTERNAL MEDICINE

## 2025-03-24 PROCEDURE — 3700000001 HC ADD 15 MINUTES (ANESTHESIA): Performed by: INTERNAL MEDICINE

## 2025-03-24 PROCEDURE — 3700000000 HC ANESTHESIA ATTENDED CARE: Performed by: INTERNAL MEDICINE

## 2025-03-24 PROCEDURE — 85027 COMPLETE CBC AUTOMATED: CPT

## 2025-03-24 PROCEDURE — 80048 BASIC METABOLIC PNL TOTAL CA: CPT

## 2025-03-24 PROCEDURE — 6360000002 HC RX W HCPCS: Performed by: NURSE ANESTHETIST, CERTIFIED REGISTERED

## 2025-03-24 PROCEDURE — 6370000000 HC RX 637 (ALT 250 FOR IP): Performed by: INTERNAL MEDICINE

## 2025-03-24 PROCEDURE — 85730 THROMBOPLASTIN TIME PARTIAL: CPT

## 2025-03-24 PROCEDURE — C1892 INTRO/SHEATH,FIXED,PEEL-AWAY: HCPCS | Performed by: INTERNAL MEDICINE

## 2025-03-24 PROCEDURE — 85610 PROTHROMBIN TIME: CPT

## 2025-03-24 PROCEDURE — 7100000010 HC PHASE II RECOVERY - FIRST 15 MIN: Performed by: INTERNAL MEDICINE

## 2025-03-24 PROCEDURE — 93005 ELECTROCARDIOGRAM TRACING: CPT | Performed by: STUDENT IN AN ORGANIZED HEALTH CARE EDUCATION/TRAINING PROGRAM

## 2025-03-24 PROCEDURE — 7100000011 HC PHASE II RECOVERY - ADDTL 15 MIN: Performed by: INTERNAL MEDICINE

## 2025-03-24 PROCEDURE — C1894 INTRO/SHEATH, NON-LASER: HCPCS | Performed by: INTERNAL MEDICINE

## 2025-03-24 PROCEDURE — C1785 PMKR, DUAL, RATE-RESP: HCPCS | Performed by: INTERNAL MEDICINE

## 2025-03-24 PROCEDURE — 2500000003 HC RX 250 WO HCPCS: Performed by: NURSE ANESTHETIST, CERTIFIED REGISTERED

## 2025-03-24 PROCEDURE — 71046 X-RAY EXAM CHEST 2 VIEWS: CPT

## 2025-03-24 PROCEDURE — 2580000003 HC RX 258: Performed by: STUDENT IN AN ORGANIZED HEALTH CARE EDUCATION/TRAINING PROGRAM

## 2025-03-24 PROCEDURE — 2709999900 HC NON-CHARGEABLE SUPPLY: Performed by: INTERNAL MEDICINE

## 2025-03-24 PROCEDURE — C1887 CATHETER, GUIDING: HCPCS | Performed by: INTERNAL MEDICINE

## 2025-03-24 DEVICE — IPG W1DR01 AZURE XT DR MRI USA
Type: IMPLANTABLE DEVICE | Status: FUNCTIONAL
Brand: AZURE™ XT DR MRI SURESCAN™

## 2025-03-24 DEVICE — LEAD 3830 US MKT/ 69CM MRI LBBAP
Type: IMPLANTABLE DEVICE | Status: FUNCTIONAL
Brand: SELECTSECURE™ MRI SURESCAN™

## 2025-03-24 DEVICE — LEAD 5076-52 MRI US RCMCRD
Type: IMPLANTABLE DEVICE | Status: FUNCTIONAL
Brand: CAPSUREFIX NOVUS MRI™ SURESCAN®

## 2025-03-24 RX ORDER — GLYCOPYRROLATE 0.2 MG/ML
INJECTION INTRAMUSCULAR; INTRAVENOUS
Status: DISCONTINUED | OUTPATIENT
Start: 2025-03-24 | End: 2025-03-24 | Stop reason: SDUPTHER

## 2025-03-24 RX ORDER — SODIUM CHLORIDE 9 MG/ML
INJECTION, SOLUTION INTRAVENOUS PRN
Status: DISCONTINUED | OUTPATIENT
Start: 2025-03-24 | End: 2025-03-24 | Stop reason: HOSPADM

## 2025-03-24 RX ORDER — ACETAMINOPHEN 325 MG/1
650 TABLET ORAL EVERY 4 HOURS PRN
Status: DISCONTINUED | OUTPATIENT
Start: 2025-03-24 | End: 2025-03-24 | Stop reason: HOSPADM

## 2025-03-24 RX ORDER — EPHEDRINE SULFATE/0.9% NACL/PF 25 MG/5 ML
SYRINGE (ML) INTRAVENOUS
Status: DISCONTINUED | OUTPATIENT
Start: 2025-03-24 | End: 2025-03-24 | Stop reason: SDUPTHER

## 2025-03-24 RX ORDER — SODIUM CHLORIDE 9 MG/ML
INJECTION, SOLUTION INTRAVENOUS CONTINUOUS
Status: DISCONTINUED | OUTPATIENT
Start: 2025-03-24 | End: 2025-03-24 | Stop reason: HOSPADM

## 2025-03-24 RX ORDER — PROPOFOL 10 MG/ML
INJECTION, EMULSION INTRAVENOUS
Status: DISCONTINUED | OUTPATIENT
Start: 2025-03-24 | End: 2025-03-24 | Stop reason: SDUPTHER

## 2025-03-24 RX ORDER — SODIUM CHLORIDE 0.9 % (FLUSH) 0.9 %
5-40 SYRINGE (ML) INJECTION PRN
Status: DISCONTINUED | OUTPATIENT
Start: 2025-03-24 | End: 2025-03-24 | Stop reason: HOSPADM

## 2025-03-24 RX ORDER — SODIUM CHLORIDE 0.9 % (FLUSH) 0.9 %
5-40 SYRINGE (ML) INJECTION EVERY 12 HOURS SCHEDULED
Status: DISCONTINUED | OUTPATIENT
Start: 2025-03-24 | End: 2025-03-24 | Stop reason: HOSPADM

## 2025-03-24 RX ORDER — SODIUM CHLORIDE 9 MG/ML
INJECTION, SOLUTION INTRAVENOUS
Status: DISCONTINUED | OUTPATIENT
Start: 2025-03-24 | End: 2025-03-24 | Stop reason: SDUPTHER

## 2025-03-24 RX ORDER — MIDAZOLAM HYDROCHLORIDE 1 MG/ML
INJECTION, SOLUTION INTRAMUSCULAR; INTRAVENOUS
Status: DISCONTINUED | OUTPATIENT
Start: 2025-03-24 | End: 2025-03-24 | Stop reason: SDUPTHER

## 2025-03-24 RX ORDER — LIDOCAINE HYDROCHLORIDE 20 MG/ML
INJECTION, SOLUTION INFILTRATION; PERINEURAL
Status: DISCONTINUED | OUTPATIENT
Start: 2025-03-24 | End: 2025-03-24 | Stop reason: SDUPTHER

## 2025-03-24 RX ADMIN — MIDAZOLAM 2 MG: 1 INJECTION INTRAMUSCULAR; INTRAVENOUS at 11:00

## 2025-03-24 RX ADMIN — PROPOFOL 150 MCG/KG/MIN: 10 INJECTION, EMULSION INTRAVENOUS at 11:05

## 2025-03-24 RX ADMIN — WATER 2000 MG: 1 INJECTION INTRAMUSCULAR; INTRAVENOUS; SUBCUTANEOUS at 11:03

## 2025-03-24 RX ADMIN — EPHEDRINE SULFATE 10 MG: 5 INJECTION INTRAVENOUS at 11:52

## 2025-03-24 RX ADMIN — LIDOCAINE HYDROCHLORIDE 60 MG: 20 INJECTION, SOLUTION INFILTRATION; PERINEURAL at 11:02

## 2025-03-24 RX ADMIN — ACETAMINOPHEN 650 MG: 325 TABLET ORAL at 15:40

## 2025-03-24 RX ADMIN — SODIUM CHLORIDE: 0.9 INJECTION, SOLUTION INTRAVENOUS at 10:41

## 2025-03-24 RX ADMIN — SODIUM CHLORIDE: 9 INJECTION, SOLUTION INTRAVENOUS at 11:00

## 2025-03-24 RX ADMIN — PROPOFOL 50 MG: 10 INJECTION, EMULSION INTRAVENOUS at 11:02

## 2025-03-24 RX ADMIN — EPHEDRINE SULFATE 10 MG: 5 INJECTION INTRAVENOUS at 11:19

## 2025-03-24 RX ADMIN — GLYCOPYRROLATE 0.2 MG: 0.2 INJECTION, SOLUTION INTRAMUSCULAR; INTRAVENOUS at 11:50

## 2025-03-24 ASSESSMENT — PAIN SCALES - GENERAL
PAINLEVEL_OUTOF10: 3
PAINLEVEL_OUTOF10: 0

## 2025-03-24 ASSESSMENT — PAIN DESCRIPTION - DESCRIPTORS: DESCRIPTORS: ACHING

## 2025-03-24 ASSESSMENT — PAIN DESCRIPTION - ORIENTATION: ORIENTATION: LEFT

## 2025-03-24 ASSESSMENT — PAIN DESCRIPTION - LOCATION: LOCATION: ARM

## 2025-03-24 NOTE — PLAN OF CARE
Problem: Pain  Goal: Verbalizes/displays adequate comfort level or baseline comfort level  Outcome: Progressing  Flowsheets (Taken 3/24/2025 1023)  Verbalizes/displays adequate comfort level or baseline comfort level:   Encourage patient to monitor pain and request assistance   Assess pain using appropriate pain scale     Problem: ABCDS Injury Assessment  Goal: Absence of physical injury  Outcome: Progressing     Problem: Cardiovascular - Adult  Goal: Maintains optimal cardiac output and hemodynamic stability  Outcome: Progressing  Flowsheets (Taken 3/24/2025 1023)  Maintains optimal cardiac output and hemodynamic stability:   Monitor blood pressure and heart rate   Monitor urine output and notify Licensed Independent Practitioner for values outside of normal range     Problem: Discharge Planning  Goal: Discharge to home or other facility with appropriate resources  Outcome: Progressing  Flowsheets (Taken 3/24/2025 1023)  Discharge to home or other facility with appropriate resources:   Identify barriers to discharge with patient and caregiver   Arrange for needed discharge resources and transportation as appropriate   Care plan reviewed with patient.  Patient verbalizes understanding of the plan of care and contributes to goal setting.

## 2025-03-24 NOTE — PROGRESS NOTES
1630 Pt has no further questions at this time.  Belongings gathered.  Pt leaving for home with wife, Cristina.

## 2025-03-24 NOTE — H&P
[facility]  [facility] (EP)  [address] [address]  H&P and SEDATION/ANALGESIA     Patient demographics:  Date:   3/24/2025  Patient name: James Waters  YOB: 1950  Sex: male   MRN:   [id number]    Reason for admission or planned procedure:  Sinsu pause, tachy gabriele    Code Status: Full Code    Consent:I have discussed with the patient and/or the patient representative the indication, alternatives, and the possible risks and/or complications of the planned procedure and the anesthesia methods. The patient and/or patient representative appear to understand and agree to proceed.      Brief clinical summary:  Please see recent H&P for full details.     Past Medical History:  Past Medical History:   Diagnosis Date    A-fib (HCC)     Acquired hypothyroidism 02/21/2019    Atrial fibrillation with RVR (HCC) 10/09/2019    CAD (coronary artery disease)     Cancer (HCC)     SKIN    DDD (degenerative disc disease), cervical 04/10/2017    GERD (gastroesophageal reflux disease)     Gout     Hyperlipidemia     Hypertension     Obesity (BMI 30.0-34.9) 02/14/2019    PAD (peripheral artery disease) 12/19/2019       Past Surgical History:  Past Surgical History:   Procedure Laterality Date    CARDIAC CATHETERIZATION  08/20/2004    EF 60%    CARDIAC CATHETERIZATION  03/01/2003    EF 60%    CARDIOVASCULAR STRESS TEST  02/25/2010    EF 63%    CARDIOVASCULAR STRESS TEST  11/17/2006    EF 68%    CARDIOVASCULAR STRESS TEST  08/18/2005    EF 48%    CARDIOVASCULAR STRESS TEST  07/27/2004    EF 63%    CARDIOVASCULAR STRESS TEST  03/18/2003    EF 43%    CAROTID STENT PLACEMENT      x3    DIAGNOSTIC CARDIAC CATH LAB PROCEDURE  08/18/2000    EF 70%    PTCA  07/19/2006    CYPHER STENT TO MID RCA    SKIN CANCER EXCISION  10/30/2023    TRANSTHORACIC ECHOCARDIOGRAM  02/25/2010    EF 55-65%        Allergies:   Allergies as of 03/11/2025 - Fully Reviewed 03/11/2025   Allergen Reaction

## 2025-03-24 NOTE — PROGRESS NOTES
1000 Patient admitted to [facility]  ambulatory for Pacemaker .  Patient NPO. Patient accompanied by spouse, Cristina.  Vital signs obtained.   Assessment and data collection intiated.   Oriented to room.  Policies and procedures for  explained.   All questions answered with no further questions at this time.   Fall prevention and safety precautions discussed with patient.

## 2025-03-24 NOTE — ANESTHESIA PRE PROCEDURE
Department of Anesthesiology  Preprocedure Note       Name:  James Waters   Age:  74 y.o.  :  1950                                          MRN:  [id number]         Date:  3/24/2025      Surgeon: Surgeon(s):  Adriel Reyna MD    Procedure: Procedure(s):  Insert PPM dual    Medications prior to admission:   Prior to Admission medications    Medication Sig Start Date End Date Taking? Authorizing Provider   tamsulosin (FLOMAX) 0.4 MG capsule Take 1 capsule by mouth daily 3/13/25   Toni Christina PA-C   memantine (NAMENDA) 5 MG tablet Take 1 tablet by mouth 2 times daily    ProviderMiryam MD   nitroGLYCERIN (NITROSTAT) 0.4 MG SL tablet up to max of 3 total doses. If no relief after 1 dose, call 911. 3/10/25   Ugo Munroe MD   atenolol (TENORMIN) 25 MG tablet Take 0.5 tablets by mouth daily    ProviderMiryam MD   folic acid (FOLVITE) 1 MG tablet Take 1 tablet by mouth once daily 24   Leopold, Paige L, APRN - CNP   fenofibrate 160 MG tablet Take 1 tablet by mouth once daily 24   Diego Fernandez APRN - CNP   clopidogrel (PLAVIX) 75 MG tablet Take 1 tablet by mouth once daily 24   Ugo Munroe MD   apixaban (ELIQUIS) 5 MG TABS tablet TAKE 1 TABLET BY MOUTH EVERY 12 HOURS 24   Diego Fernandez APRN - CNP   SYNTHROID 100 MCG tablet TAKE 1 TABLET BY MOUTH ONCE DAILY ON AN EMPTY STOMACH WITH  WATER  .  WAIT  30  MINUTES  BEFORE  EATING  OR  TAKING  OTHER  MEDS. 24   Diego Fernandez APRN - CNP   amLODIPine (NORVASC) 5 MG tablet TAKE 1 TABLET BY MOUTH IN THE MORNING AND AT BEDTIME 24   Ugo Munroe MD   valsartan-hydroCHLOROthiazide (DIOVAN-HCT) 320-25 MG per tablet Take 1 tablet by mouth once daily 24   Ugo Munroe MD   Multiple Vitamins-Minerals (THERAPEUTIC MULTIVITAMIN-MINERALS) tablet Take 1 tablet by mouth daily    ProviderMiryam MD       Current medications:    Current Facility-Administered Medications   Medication

## 2025-03-24 NOTE — PROGRESS NOTES
1230 pt returned from cath lab.  Left chest pacer site dressing clean/dry/intact.  Pt denies pain.  Cold compress applied at incision site.  Pt has no questions at this time.

## 2025-03-24 NOTE — FLOWSHEET NOTE
03/24/25 1550   AVS Reviewed   AVS & discharge instructions reviewed with patient and/or representative? Yes   Reviewed instructions with Patient;Other (name and relationship in comment)  (spouse, Cristina)   Level of Understanding Questions answered;Teach back completed;Verbalized understanding;Return demonstration

## 2025-03-24 NOTE — ANESTHESIA POSTPROCEDURE EVALUATION
Department of Anesthesiology  Postprocedure Note    Patient: James Waters  MRN: [id number]  YOB: 1950  Date of evaluation: 3/24/2025    Procedure Summary       Date: 03/24/25 Room / Location: [facility] CATH LAB  / [facility] CARDIAC CATH LAB    Anesthesia Start: 1100 Anesthesia Stop: 1223    Procedure: Insert PPM dual Diagnosis:       Afib (HCC)      Sinus pause      Bradycardia      (Afib (HCC) [I48.91])      (Sinus pause [I45.5])      (Bradycardia [R00.1])    Providers: Adriel Reyna MD Responsible Provider: Garrett Renee DO    Anesthesia Type: MAC ASA Status: 3            Anesthesia Type: MAC    Dejon Phase I: Dejon Score: 10    Dejon Phase II:      Anesthesia Post Evaluation    Patient location during evaluation: bedside  Patient participation: complete - patient participated  Level of consciousness: awake and alert  Pain score: 0  Airway patency: patent  Nausea & Vomiting: no vomiting and no nausea  Cardiovascular status: hemodynamically stable  Respiratory status: acceptable  Hydration status: stable  Pain management: adequate        No notable events documented.

## 2025-03-24 NOTE — DISCHARGE INSTRUCTIONS
Pacemaker Discharge Instructions after implantation    Normal Observations  - The pacemaker may bulge slightly under the skin, this is normal and common after surgery.  This will lesson over the next few weeks.    -You may have bruising around the incision, especially if you are on blood thinner medications such as Aspirin, Coumadin, Eliquis, Xarelto, Plavix or Brilinta.     Activity     -.  You should gradually return to your normal activities  -.  For the first 4 weeks:               a.  Do not lift more than 15 pounds               b.  Do not swim, golf, bowl, run/jog, tennis, lifting weights, chopping wood, mowing the lawn, shovel snow, Aerobics or vacuum.               c.  Do not raise your elbow on the same side of your device, above your shoulder height               d.  At your 2 week follow up visit, ask your doctor which of the above activities you can                    resume    e.  ALWAYS avoid any contact sports or hard blows to the chest or abdomen.    f.  Avoid sleeping on the left side and face down.   -.  You may resume your sexual activity when you feel ready  -  Call the office at [phone] if you have any of the following   - increased swelling    - increased redness   - increased pain   - fever, chills   - Drainage from the site   - IF AT ANY TIME THE SITE \"OPENS UP\". ESPECIALLY IF YOU CAN SEE THE DEVICE THROUGH THE INCISION, REPORT TO EMERGENCY ROOM IMMEDIATELY      Wound Care    Leave dressing on for 3 days, then remove.  Do not remove Steri strips.  You may shower in 24 hours, as long as dressing remains intact on all sides.  Do NOT soak in bath water higher than your waist.  Do NOT let water soak the incision.    Do not apply any ointment, talc, or lotion to the incision.  Itching is a normal healing process, Do NOT scratch, rub or touch the incision with your hands  Wash your hands with soap and water for at least 15 seconds before and after touching your incision or dressing.  The

## 2025-03-26 PROCEDURE — 93294 REM INTERROG EVL PM/LDLS PM: CPT | Performed by: NUCLEAR MEDICINE

## 2025-03-26 PROCEDURE — 93296 REM INTERROG EVL PM/IDS: CPT | Performed by: NUCLEAR MEDICINE

## 2025-03-28 ENCOUNTER — CLINICAL SUPPORT (OUTPATIENT)
Dept: CARDIOLOGY CLINIC | Age: 75
End: 2025-03-28

## 2025-03-28 DIAGNOSIS — Z95.0 PACEMAKER: Primary | ICD-10-CM

## 2025-03-28 NOTE — PROGRESS NOTES
Patient presents to device clinic with site concerns--patient recently had PPM implanted 3-24-25 with Dr Reyna. Wife LM on device clinic VM stating they removed the top bandage and all the steri strips were removed with it; wife stated pacer site now has an opening.    Home dressing removed with no drainage noted. Site appears clean and dry with small opening at the medial end of the incision. Site appears without redness, drainage, and slight bruising noted. Site cleansed with ChloraPrep, photo obtained, and new steri strips applied. Patient and wife aware to remove new steri strips in 10 days or may allow bandages to fall off on their own. Patient aware if at any time the incision site opens up they are recommended to present to the ED for evaluation. Patient aware to monitor for sxs of infection and to call the office for any questions or concerns.

## 2025-04-01 RX ORDER — ATENOLOL 25 MG/1
12.5 TABLET ORAL DAILY
Qty: 90 TABLET | Refills: 0 | Status: SHIPPED | OUTPATIENT
Start: 2025-04-01

## 2025-04-01 NOTE — TELEPHONE ENCOUNTER
Pt's wife Lexie called in and said he needed this Tenormin (Atenolol called into [facility].  He is taking 12.5mg daily taking 0.5mg daily. Needs to go [facility] ph. [phone]

## 2025-04-07 ENCOUNTER — TELEPHONE (OUTPATIENT)
Dept: CARDIOLOGY CLINIC | Age: 75
End: 2025-04-07

## 2025-04-07 NOTE — TELEPHONE ENCOUNTER
Received call from patient's wife stating approximately 30 minutes ago pt was putting a sweatshirt on and came downstairs and felt palpitations and dizziness. Pt checked HR with [employer] showing regular HR at 84 bpm; wife states 84 is high for pt. Informed pt wife may not be HR or pacemaker related but had her send home download.     Informed pt that pacemaker LRL is set at 60 and HR will not remain 60 all the time. Wife is going to check patient's BP. Of note, wife stated patient has been active and feeling good--used self propelled  over the weekend (wife aware of new implant and lead concerns/mobility restrictions)    Keep open to see what download shows

## 2025-04-07 NOTE — TELEPHONE ENCOUNTER
Download received showing device function WNL no episodes. Pt wife notified and stated BP was WNL--did not provide number, feeling better    Baki any recommendations?

## 2025-04-08 RX ORDER — VALSARTAN AND HYDROCHLOROTHIAZIDE 320; 25 MG/1; MG/1
1 TABLET, FILM COATED ORAL DAILY
Qty: 90 TABLET | Refills: 0 | Status: SHIPPED | OUTPATIENT
Start: 2025-04-08

## 2025-05-02 ENCOUNTER — COMMUNITY OUTREACH (OUTPATIENT)
Dept: FAMILY MEDICINE CLINIC | Age: 75
End: 2025-05-02

## 2025-05-07 ENCOUNTER — OFFICE VISIT (OUTPATIENT)
Dept: CARDIOLOGY CLINIC | Age: 75
End: 2025-05-07

## 2025-05-07 ENCOUNTER — CLINICAL SUPPORT (OUTPATIENT)
Dept: CARDIOLOGY CLINIC | Age: 75
End: 2025-05-07

## 2025-05-07 VITALS
HEART RATE: 72 BPM | DIASTOLIC BLOOD PRESSURE: 62 MMHG | SYSTOLIC BLOOD PRESSURE: 124 MMHG | BODY MASS INDEX: 28.44 KG/M2 | HEIGHT: 69 IN | WEIGHT: 192 LBS

## 2025-05-07 DIAGNOSIS — I48.0 PAROXYSMAL ATRIAL FIBRILLATION (HCC): ICD-10-CM

## 2025-05-07 DIAGNOSIS — E78.01 FAMILIAL HYPERCHOLESTEROLEMIA: ICD-10-CM

## 2025-05-07 DIAGNOSIS — I25.10 CORONARY ARTERY DISEASE INVOLVING NATIVE CORONARY ARTERY OF NATIVE HEART WITHOUT ANGINA PECTORIS: ICD-10-CM

## 2025-05-07 DIAGNOSIS — I10 PRIMARY HYPERTENSION: Primary | ICD-10-CM

## 2025-05-07 DIAGNOSIS — Z95.0 PACEMAKER: Primary | ICD-10-CM

## 2025-05-07 RX ORDER — ATENOLOL 25 MG/1
12.5 TABLET ORAL DAILY
Qty: 45 TABLET | Refills: 3 | Status: SHIPPED | OUTPATIENT
Start: 2025-05-07

## 2025-05-07 RX ORDER — AMLODIPINE BESYLATE 5 MG/1
5 TABLET ORAL 2 TIMES DAILY
Qty: 180 TABLET | Refills: 3 | Status: SHIPPED | OUTPATIENT
Start: 2025-05-07

## 2025-05-07 RX ORDER — CLOPIDOGREL BISULFATE 75 MG/1
75 TABLET ORAL DAILY
Qty: 90 TABLET | Refills: 3 | Status: SHIPPED | OUTPATIENT
Start: 2025-05-07

## 2025-05-07 RX ORDER — VALSARTAN AND HYDROCHLOROTHIAZIDE 320; 25 MG/1; MG/1
1 TABLET, FILM COATED ORAL DAILY
Qty: 90 TABLET | Refills: 3 | Status: SHIPPED | OUTPATIENT
Start: 2025-05-07

## 2025-05-07 NOTE — PROGRESS NOTES
Patient here for follow up.  Patient denies any cardiac symptoms.   
124/62   Pulse 72   Ht 1.753 m (5' 9\")   Wt 87.1 kg (192 lb)   BMI 28.35 kg/m²     Assessment:  Assessment & Plan    Diagnosis Orders   1. Primary hypertension        2. Paroxysmal atrial fibrillation (HCC)        3. Coronary artery disease involving native coronary artery of native heart without angina pectoris        4. Familial hypercholesterolemia        As above  Cardiac fair for now   All risk factor for CAD addressed  Still with PAF    Plan:  No follow-ups on file.  As above  Discussed his A fib at length and in details   Continue risk factor modification and medical management  Thank you for allowing me to participate in the care of your patient. Please don't hesitate to contact me regarding any further issues related to the patient care    Orders Placed:  No orders of the defined types were placed in this encounter.      Prescribed:  No orders of the defined types were placed in this encounter.         Discussed use, benefit, and side effects of prescribed medications. All patient questions answered. Pt voicedunderstanding. Instructed to continue current medications, diet and exercise. Continue risk factor modification and medical management. Patient agreed with treatment plan. Follow up as directed.    Electronically signedby Ugo Munroe MD on 5/7/2025 at 8:53 AM

## 2025-05-07 NOTE — PROGRESS NOTES
In Office [employer]onic Dual Pacemaker   Patient of Kim Kessler sees today    Battery 15.2 years    Presenting rhythm AP VS 70s  Underlying rhythm AS VS     A Impedance 456  RV Impedance 722    P wave sensing 3.5  R wave sensing >20    A Threshold 0.5 @ 0.4  A Amplitude 2.0 @ 0.4 (adaptive on)  RV Thresholds 1.0 @ 0.4  RV Amplitude 2.5 @ 0.4 (adaptive on)      A Paced 28.9%  V Paced <0.1%    Programmed Mode AAIR <=> DDDR       Afib Kenneth 0%    Episodes none    Device Reprogrammed   Atrial Amplitude changed from 3.5 to 2.0   Ventricular Amplitude changed from 3.5 to 2.5

## 2025-05-14 ENCOUNTER — TELEPHONE (OUTPATIENT)
Dept: FAMILY MEDICINE CLINIC | Age: 75
End: 2025-05-14

## 2025-05-14 NOTE — TELEPHONE ENCOUNTER
Patient is overdue for A1C, lab order printed and letter attached asking the patient to have it completed and placed in the mail.

## 2025-05-19 ENCOUNTER — TELEPHONE (OUTPATIENT)
Dept: FAMILY MEDICINE CLINIC | Age: 75
End: 2025-05-19

## 2025-05-19 NOTE — TELEPHONE ENCOUNTER
Called and reminded the patient that he is due for his A1C and lipid panel.  Orders printed and emailed to  [email or web address] per patient request.

## 2025-06-09 ENCOUNTER — TELEPHONE (OUTPATIENT)
Dept: FAMILY MEDICINE CLINIC | Age: 75
End: 2025-06-09

## 2025-07-09 ENCOUNTER — TELEPHONE (OUTPATIENT)
Dept: FAMILY MEDICINE CLINIC | Age: 75
End: 2025-07-09

## 2025-07-09 NOTE — TELEPHONE ENCOUNTER
The patient is past due for his A1C.  Order printed and mailed along with a letter asking him to have it completed.

## 2025-08-26 ENCOUNTER — TELEPHONE (OUTPATIENT)
Dept: FAMILY MEDICINE CLINIC | Age: 75
End: 2025-08-26

## 2025-08-29 ENCOUNTER — HOSPITAL ENCOUNTER (EMERGENCY)
Age: 75
Discharge: HOME | End: 2025-08-29
Payer: MEDICARE

## 2025-08-29 VITALS
DIASTOLIC BLOOD PRESSURE: 60 MMHG | SYSTOLIC BLOOD PRESSURE: 113 MMHG | BODY MASS INDEX: 27.91 KG/M2 | WEIGHT: 189 LBS | TEMPERATURE: 97.8 F | RESPIRATION RATE: 16 BRPM | OXYGEN SATURATION: 96 % | HEART RATE: 64 BPM

## 2025-08-29 DIAGNOSIS — R53.83 OTHER FATIGUE: ICD-10-CM

## 2025-08-29 DIAGNOSIS — S39.012A STRAIN OF LUMBAR REGION, INITIAL ENCOUNTER: Primary | ICD-10-CM

## 2025-08-29 LAB
BILIRUB UR STRIP.AUTO-MCNC: ABNORMAL MG/DL
CHARACTER UR: CLEAR
COLOR, UA: ABNORMAL
GLUCOSE UR QL STRIP.AUTO: NEGATIVE MG/DL
KETONES UR QL STRIP.AUTO: ABNORMAL
NITRITE UR QL STRIP.AUTO: NEGATIVE
PH UR STRIP.AUTO: 5.5 [PH] (ref 5–9)
PROT UR STRIP.AUTO-MCNC: ABNORMAL MG/DL
RBC #/AREA URNS HPF: NEGATIVE /[HPF]
SP GR UR STRIP.AUTO: 1.02 (ref 1–1.03)
UROBILINOGEN, URINE: 1 EU/DL (ref 0.2–1)
WBC #/AREA URNS HPF: NEGATIVE /[HPF]

## 2025-08-29 PROCEDURE — 99214 OFFICE O/P EST MOD 30 MIN: CPT

## 2025-08-29 PROCEDURE — 81003 URINALYSIS AUTO W/O SCOPE: CPT

## 2025-08-29 PROCEDURE — 99213 OFFICE O/P EST LOW 20 MIN: CPT | Performed by: NURSE PRACTITIONER

## 2025-08-29 PROCEDURE — 87086 URINE CULTURE/COLONY COUNT: CPT

## 2025-08-29 RX ORDER — LIDOCAINE 50 MG/G
1 PATCH TOPICAL DAILY
Qty: 10 PATCH | Refills: 0 | Status: SHIPPED | OUTPATIENT
Start: 2025-08-29 | End: 2025-09-08

## 2025-08-29 RX ORDER — IBUPROFEN 200 MG
200 TABLET ORAL EVERY 6 HOURS PRN
COMMUNITY

## 2025-08-29 RX ORDER — DONEPEZIL HYDROCHLORIDE 5 MG/1
5 TABLET, FILM COATED ORAL DAILY
COMMUNITY
Start: 2025-08-25 | End: 2025-09-24

## 2025-08-29 ASSESSMENT — PAIN DESCRIPTION - PAIN TYPE: TYPE: ACUTE PAIN

## 2025-08-29 ASSESSMENT — PAIN SCALES - GENERAL: PAINLEVEL_OUTOF10: 6

## 2025-08-29 ASSESSMENT — ENCOUNTER SYMPTOMS
SHORTNESS OF BREATH: 0
COLOR CHANGE: 0
ABDOMINAL SWELLING: 0
ABDOMINAL PAIN: 0
CHOKING: 0
BOWEL INCONTINENCE: 0
APNEA: 0
WHEEZING: 0
BACK PAIN: 1
COUGH: 0
STRIDOR: 0
CHEST TIGHTNESS: 0

## 2025-08-29 ASSESSMENT — PAIN - FUNCTIONAL ASSESSMENT
PAIN_FUNCTIONAL_ASSESSMENT: PREVENTS OR INTERFERES SOME ACTIVE ACTIVITIES AND ADLS
PAIN_FUNCTIONAL_ASSESSMENT: 0-10

## 2025-08-29 ASSESSMENT — PAIN DESCRIPTION - FREQUENCY: FREQUENCY: CONTINUOUS

## 2025-08-29 ASSESSMENT — PAIN DESCRIPTION - LOCATION: LOCATION: FLANK

## 2025-08-29 ASSESSMENT — PAIN DESCRIPTION - ORIENTATION: ORIENTATION: LEFT

## 2025-08-29 ASSESSMENT — PAIN DESCRIPTION - DESCRIPTORS: DESCRIPTORS: DULL;ACHING

## 2025-08-29 ASSESSMENT — PAIN DESCRIPTION - ONSET: ONSET: SUDDEN

## 2025-08-31 LAB — BACTERIA UR CULT: NORMAL

## (undated) DEVICE — 4F (1.0MM ID) X 9CM STIFF4F (1.0 MICRO-STICK®INTRODUCER SE WITH NITINOL GUIDEWIREWITH NITIN WITH RADIOPAQUE TIPWITH RADIOPAQ: Brand: MICRO-STICK SETMICRO-STICK SET

## (undated) DEVICE — INTRODUCER SHTH L13CM OD7FR SH ORNG HUB SEAMLESS SAFSHTH

## (undated) DEVICE — DRESSING HYDROFIBER AQUACEL AG ADVANT 3.5X4 IN

## (undated) DEVICE — CATHETER GUID OD7FR ID5.4FR L40CM C315

## (undated) DEVICE — CATHETER GUID L45CM DIA3MM L COR MP BEND 2 INTEGR HEMSTAS